# Patient Record
Sex: FEMALE | Race: WHITE | NOT HISPANIC OR LATINO | Employment: OTHER | URBAN - METROPOLITAN AREA
[De-identification: names, ages, dates, MRNs, and addresses within clinical notes are randomized per-mention and may not be internally consistent; named-entity substitution may affect disease eponyms.]

---

## 2017-04-20 PROBLEM — I10 ESSENTIAL HYPERTENSION: Status: ACTIVE | Noted: 2017-04-20

## 2019-04-28 PROBLEM — E83.52 HYPERCALCEMIA: Status: ACTIVE | Noted: 2019-04-28

## 2019-04-28 PROBLEM — R07.9 CHEST PAIN: Status: ACTIVE | Noted: 2019-04-28

## 2019-04-28 PROBLEM — E87.1 HYPONATREMIA: Status: ACTIVE | Noted: 2019-04-28

## 2019-04-29 PROBLEM — E83.52 HYPERCALCEMIA: Status: RESOLVED | Noted: 2019-04-28 | Resolved: 2019-04-29

## 2020-12-29 PROBLEM — F51.01 PRIMARY INSOMNIA: Status: ACTIVE | Noted: 2020-12-29

## 2022-01-01 ENCOUNTER — TELEPHONE (OUTPATIENT)
Dept: SURGERY | Facility: CLINIC | Age: 62
End: 2022-01-01
Payer: MEDICARE

## 2022-01-01 ENCOUNTER — TELEPHONE (OUTPATIENT)
Dept: SURGERY | Facility: CLINIC | Age: 62
End: 2022-01-01

## 2022-01-01 ENCOUNTER — ANESTHESIA EVENT (OUTPATIENT)
Dept: INTENSIVE CARE | Facility: HOSPITAL | Age: 62
DRG: 326 | End: 2022-01-01
Payer: MEDICARE

## 2022-01-01 ENCOUNTER — ANESTHESIA (OUTPATIENT)
Dept: INTENSIVE CARE | Facility: HOSPITAL | Age: 62
DRG: 326 | End: 2022-01-01
Payer: MEDICARE

## 2022-01-01 ENCOUNTER — OFFICE VISIT (OUTPATIENT)
Dept: SURGERY | Facility: CLINIC | Age: 62
End: 2022-01-01
Payer: MEDICARE

## 2022-01-01 ENCOUNTER — TELEPHONE (OUTPATIENT)
Dept: HEMATOLOGY/ONCOLOGY | Facility: CLINIC | Age: 62
End: 2022-01-01
Payer: MEDICARE

## 2022-01-01 ENCOUNTER — OFFICE VISIT (OUTPATIENT)
Dept: HEMATOLOGY/ONCOLOGY | Facility: CLINIC | Age: 62
End: 2022-01-01
Payer: MEDICARE

## 2022-01-01 ENCOUNTER — ANESTHESIA EVENT (OUTPATIENT)
Dept: SURGERY | Facility: HOSPITAL | Age: 62
DRG: 326 | End: 2022-01-01
Payer: MEDICARE

## 2022-01-01 ENCOUNTER — TREATMENT PLANNING (OUTPATIENT)
Dept: SURGERY | Facility: CLINIC | Age: 62
End: 2022-01-01
Payer: MEDICARE

## 2022-01-01 ENCOUNTER — LAB VISIT (OUTPATIENT)
Dept: LAB | Facility: HOSPITAL | Age: 62
End: 2022-01-01
Attending: SURGERY
Payer: MEDICARE

## 2022-01-01 ENCOUNTER — HOSPITAL ENCOUNTER (OUTPATIENT)
Dept: RADIOLOGY | Facility: HOSPITAL | Age: 62
Discharge: HOME | End: 2022-09-29
Attending: SURGERY
Payer: MEDICARE

## 2022-01-01 ENCOUNTER — DOCUMENTATION ONLY (OUTPATIENT)
Dept: ADMINISTRATIVE | Facility: OTHER | Age: 62
End: 2022-01-01
Payer: MEDICARE

## 2022-01-01 ENCOUNTER — DOCUMENTATION ONLY (OUTPATIENT)
Dept: SURGERY | Facility: CLINIC | Age: 62
End: 2022-01-01

## 2022-01-01 ENCOUNTER — DOCUMENTATION ONLY (OUTPATIENT)
Dept: HEMATOLOGY/ONCOLOGY | Facility: CLINIC | Age: 62
End: 2022-01-01
Payer: MEDICARE

## 2022-01-01 ENCOUNTER — ANESTHESIA (OUTPATIENT)
Dept: SURGERY | Facility: HOSPITAL | Age: 62
DRG: 326 | End: 2022-01-01
Payer: MEDICARE

## 2022-01-01 ENCOUNTER — TUMOR BOARD CONFERENCE (OUTPATIENT)
Dept: HEMATOLOGY/ONCOLOGY | Facility: CLINIC | Age: 62
End: 2022-01-01
Payer: MEDICARE

## 2022-01-01 ENCOUNTER — HOSPITAL ENCOUNTER (INPATIENT)
Facility: HOSPITAL | Age: 62
LOS: 6 days | DRG: 326 | End: 2022-11-08
Attending: SURGERY | Admitting: SURGERY
Payer: MEDICARE

## 2022-01-01 ENCOUNTER — HOSPITAL ENCOUNTER (OUTPATIENT)
Dept: RADIOLOGY | Facility: HOSPITAL | Age: 62
Discharge: HOME | End: 2022-09-21
Attending: SURGERY
Payer: MEDICARE

## 2022-01-01 VITALS
TEMPERATURE: 98 F | DIASTOLIC BLOOD PRESSURE: 67 MMHG | RESPIRATION RATE: 16 BRPM | BODY MASS INDEX: 19.61 KG/M2 | SYSTOLIC BLOOD PRESSURE: 148 MMHG | HEIGHT: 64 IN | HEART RATE: 64 BPM | OXYGEN SATURATION: 100 % | WEIGHT: 114.88 LBS

## 2022-01-01 VITALS
OXYGEN SATURATION: 69 % | RESPIRATION RATE: 24 BRPM | DIASTOLIC BLOOD PRESSURE: 61 MMHG | HEIGHT: 64 IN | HEART RATE: 87 BPM | BODY MASS INDEX: 21.22 KG/M2 | TEMPERATURE: 99 F | WEIGHT: 124.31 LBS | SYSTOLIC BLOOD PRESSURE: 100 MMHG

## 2022-01-01 VITALS
WEIGHT: 122 LBS | HEIGHT: 64 IN | HEART RATE: 74 BPM | DIASTOLIC BLOOD PRESSURE: 77 MMHG | OXYGEN SATURATION: 100 % | BODY MASS INDEX: 20.83 KG/M2 | SYSTOLIC BLOOD PRESSURE: 178 MMHG

## 2022-01-01 DIAGNOSIS — R05.8 OTHER SPECIFIED COUGH: ICD-10-CM

## 2022-01-01 DIAGNOSIS — C15.9 SQUAMOUS CELL ESOPHAGEAL CANCER: Primary | ICD-10-CM

## 2022-01-01 DIAGNOSIS — C15.9 ESOPHAGEAL CARCINOMA: ICD-10-CM

## 2022-01-01 DIAGNOSIS — R63.4 UNINTENTIONAL WEIGHT LOSS: ICD-10-CM

## 2022-01-01 DIAGNOSIS — R00.0 TACHYCARDIA: ICD-10-CM

## 2022-01-01 DIAGNOSIS — C15.9 SQUAMOUS CELL ESOPHAGEAL CANCER: ICD-10-CM

## 2022-01-01 DIAGNOSIS — R13.19 ESOPHAGEAL DYSPHAGIA: ICD-10-CM

## 2022-01-01 DIAGNOSIS — J80 ARDS (ADULT RESPIRATORY DISTRESS SYNDROME): ICD-10-CM

## 2022-01-01 DIAGNOSIS — R09.02 HYPOXIA: ICD-10-CM

## 2022-01-01 DIAGNOSIS — R94.31 QT PROLONGATION: ICD-10-CM

## 2022-01-01 DIAGNOSIS — D69.6 THROMBOCYTOPENIA: ICD-10-CM

## 2022-01-01 DIAGNOSIS — J96.01 ACUTE HYPOXEMIC RESPIRATORY FAILURE: Primary | ICD-10-CM

## 2022-01-01 DIAGNOSIS — E43 SEVERE PROTEIN-CALORIE MALNUTRITION: ICD-10-CM

## 2022-01-01 DIAGNOSIS — I48.91 ATRIAL FIBRILLATION, UNSPECIFIED TYPE: ICD-10-CM

## 2022-01-01 DIAGNOSIS — R91.8 MULTIPLE SUBSOLID LUNG NODULES LESS THAN 6 MM IN DIAMETER: ICD-10-CM

## 2022-01-01 DIAGNOSIS — I48.0 PAROXYSMAL ATRIAL FIBRILLATION: ICD-10-CM

## 2022-01-01 DIAGNOSIS — I48.91 ATRIAL FIBRILLATION: ICD-10-CM

## 2022-01-01 DIAGNOSIS — R91.8 MULTIPLE SUBSOLID LUNG NODULES LESS THAN 6 MM IN DIAMETER: Primary | ICD-10-CM

## 2022-01-01 LAB
ABO + RH BLD: NORMAL
ABO + RH BLD: NORMAL
ALBUMIN SERPL BCP-MCNC: 2 G/DL (ref 3.5–5.2)
ALBUMIN SERPL BCP-MCNC: 2.2 G/DL (ref 3.5–5.2)
ALBUMIN SERPL BCP-MCNC: 2.4 G/DL (ref 3.5–5.2)
ALBUMIN SERPL BCP-MCNC: 2.8 G/DL (ref 3.5–5.2)
ALBUMIN SERPL BCP-MCNC: 2.8 G/DL (ref 3.5–5.2)
ALBUMIN SERPL BCP-MCNC: 2.9 G/DL (ref 3.5–5.2)
ALBUMIN SERPL BCP-MCNC: 3.1 G/DL (ref 3.5–5.2)
ALBUMIN SERPL BCP-MCNC: 3.2 G/DL (ref 3.5–5.2)
ALLENS TEST: ABNORMAL
ALLENS TEST: NORMAL
ALP SERPL-CCNC: 100 U/L (ref 55–135)
ALP SERPL-CCNC: 128 U/L (ref 55–135)
ALP SERPL-CCNC: 151 U/L (ref 55–135)
ALP SERPL-CCNC: 48 U/L (ref 55–135)
ALP SERPL-CCNC: 52 U/L (ref 55–135)
ALP SERPL-CCNC: 56 U/L (ref 55–135)
ALP SERPL-CCNC: 64 U/L (ref 55–135)
ALP SERPL-CCNC: 65 U/L (ref 55–135)
ALP SERPL-CCNC: 79 U/L (ref 55–135)
ALP SERPL-CCNC: 99 U/L (ref 55–135)
ALT SERPL W/O P-5'-P-CCNC: 14 U/L (ref 10–44)
ALT SERPL W/O P-5'-P-CCNC: 15 U/L (ref 10–44)
ALT SERPL W/O P-5'-P-CCNC: 16 U/L (ref 10–44)
ALT SERPL W/O P-5'-P-CCNC: 21 U/L (ref 10–44)
ALT SERPL W/O P-5'-P-CCNC: 21 U/L (ref 10–44)
ALT SERPL W/O P-5'-P-CCNC: 25 U/L (ref 10–44)
ALT SERPL W/O P-5'-P-CCNC: 26 U/L (ref 10–44)
ALT SERPL W/O P-5'-P-CCNC: 287 U/L (ref 10–44)
ALT SERPL W/O P-5'-P-CCNC: 30 U/L (ref 10–44)
ALT SERPL W/O P-5'-P-CCNC: 55 U/L (ref 10–44)
ANION GAP SERPL CALC-SCNC: 10 MMOL/L (ref 8–16)
ANION GAP SERPL CALC-SCNC: 11 MMOL/L (ref 8–16)
ANION GAP SERPL CALC-SCNC: 11 MMOL/L (ref 8–16)
ANION GAP SERPL CALC-SCNC: 13 MMOL/L (ref 8–16)
ANION GAP SERPL CALC-SCNC: 13 MMOL/L (ref 8–16)
ANION GAP SERPL CALC-SCNC: 6 MMOL/L (ref 8–16)
ANION GAP SERPL CALC-SCNC: 6 MMOL/L (ref 8–16)
ANION GAP SERPL CALC-SCNC: 8 MMOL/L (ref 8–16)
ANION GAP SERPL CALC-SCNC: 9 MMOL/L (ref 8–16)
ANISOCYTOSIS BLD QL SMEAR: ABNORMAL
ANISOCYTOSIS BLD QL SMEAR: SLIGHT
APTT BLDCRRT: 37 SEC (ref 21–32)
ASCENDING AORTA: 3.46 CM
AST SERPL-CCNC: 20 U/L (ref 10–40)
AST SERPL-CCNC: 35 U/L (ref 10–40)
AST SERPL-CCNC: 35 U/L (ref 10–40)
AST SERPL-CCNC: 36 U/L (ref 10–40)
AST SERPL-CCNC: 38 U/L (ref 10–40)
AST SERPL-CCNC: 40 U/L (ref 10–40)
AST SERPL-CCNC: 42 U/L (ref 10–40)
AST SERPL-CCNC: 47 U/L (ref 10–40)
AST SERPL-CCNC: 671 U/L (ref 10–40)
AST SERPL-CCNC: 99 U/L (ref 10–40)
AV INDEX (PROSTH): 0.77
AV MEAN GRADIENT: 9 MMHG
AV PEAK GRADIENT: 16 MMHG
AV VALVE AREA: 2.89 CM2
AV VELOCITY RATIO: 0.78
BACTERIA #/AREA URNS AUTO: ABNORMAL /HPF
BASO STIPL BLD QL SMEAR: ABNORMAL
BASOPHILS # BLD AUTO: 0 K/UL (ref 0–0.2)
BASOPHILS # BLD AUTO: 0.01 K/UL (ref 0–0.2)
BASOPHILS # BLD AUTO: 0.02 K/UL (ref 0–0.2)
BASOPHILS # BLD AUTO: 0.03 K/UL (ref 0–0.2)
BASOPHILS # BLD AUTO: 0.04 K/UL (ref 0–0.2)
BASOPHILS # BLD AUTO: 0.04 K/UL (ref 0–0.2)
BASOPHILS # BLD AUTO: 0.08 K/UL (ref 0–0.2)
BASOPHILS # BLD AUTO: 0.09 K/UL (ref 0–0.2)
BASOPHILS NFR BLD: 0 % (ref 0–1.9)
BASOPHILS NFR BLD: 0 % (ref 0–1.9)
BASOPHILS NFR BLD: 0.1 % (ref 0–1.9)
BASOPHILS NFR BLD: 0.2 % (ref 0–1.9)
BASOPHILS NFR BLD: 0.3 % (ref 0–1.9)
BASOPHILS NFR BLD: 0.4 % (ref 0–1.9)
BASOPHILS NFR BLD: 0.5 % (ref 0–1.9)
BASOPHILS NFR BLD: 0.7 % (ref 0–1.9)
BILIRUB SERPL-MCNC: 0.5 MG/DL (ref 0.1–1)
BILIRUB SERPL-MCNC: 0.7 MG/DL (ref 0.1–1)
BILIRUB SERPL-MCNC: 0.8 MG/DL (ref 0.1–1)
BILIRUB SERPL-MCNC: 0.9 MG/DL (ref 0.1–1)
BILIRUB SERPL-MCNC: 1.1 MG/DL (ref 0.1–1)
BILIRUB SERPL-MCNC: 1.4 MG/DL (ref 0.1–1)
BILIRUB SERPL-MCNC: 1.8 MG/DL (ref 0.1–1)
BILIRUB SERPL-MCNC: 3.4 MG/DL (ref 0.1–1)
BILIRUB UR QL STRIP: NEGATIVE
BLD GP AB SCN CELLS X3 SERPL QL: NORMAL
BLD GP AB SCN CELLS X3 SERPL QL: NORMAL
BLD PROD TYP BPU: NORMAL
BLOOD UNIT EXPIRATION DATE: NORMAL
BLOOD UNIT TYPE CODE: 5100
BLOOD UNIT TYPE CODE: 5100
BLOOD UNIT TYPE CODE: 6200
BLOOD UNIT TYPE CODE: 8400
BLOOD UNIT TYPE: NORMAL
BNP SERPL-MCNC: 1147 PG/ML (ref 0–99)
BSA FOR ECHO PROCEDURE: 1.46 M2
BUN SERPL-MCNC: 11 MG/DL (ref 6–20)
BUN SERPL-MCNC: 14 MG/DL (ref 6–20)
BUN SERPL-MCNC: 15 MG/DL (ref 6–20)
BUN SERPL-MCNC: 16 MG/DL (ref 6–20)
BUN SERPL-MCNC: 16 MG/DL (ref 6–20)
BUN SERPL-MCNC: 17 MG/DL (ref 6–20)
BUN SERPL-MCNC: 20 MG/DL (ref 6–20)
BUN SERPL-MCNC: 23 MG/DL (ref 6–20)
BUN SERPL-MCNC: 24 MG/DL (ref 6–20)
BUN SERPL-MCNC: 25 MG/DL (ref 6–20)
BUN SERPL-MCNC: 26 MG/DL (ref 6–20)
BUN SERPL-MCNC: 29 MG/DL (ref 6–20)
BUN SERPL-MCNC: 31 MG/DL (ref 6–20)
BUN SERPL-MCNC: 34 MG/DL (ref 6–20)
BUN SERPL-MCNC: 36 MG/DL (ref 6–20)
BUN SERPL-MCNC: 36 MG/DL (ref 6–20)
BUN SERPL-MCNC: 39 MG/DL (ref 6–20)
BUN SERPL-MCNC: 42 MG/DL (ref 6–20)
BUN SERPL-MCNC: 51 MG/DL (ref 6–20)
BUN SERPL-MCNC: 57 MG/DL (ref 6–20)
BUN SERPL-MCNC: 57 MG/DL (ref 6–20)
BUN SERPL-MCNC: 60 MG/DL (ref 6–20)
BUN SERPL-MCNC: 65 MG/DL (ref 6–20)
BURR CELLS BLD QL SMEAR: ABNORMAL
CA-I BLDV-SCNC: 1.13 MMOL/L (ref 1.06–1.42)
CA-I BLDV-SCNC: 1.17 MMOL/L (ref 1.06–1.42)
CA-I BLDV-SCNC: 1.2 MMOL/L (ref 1.06–1.42)
CA-I BLDV-SCNC: 1.21 MMOL/L (ref 1.06–1.42)
CALCIUM SERPL-MCNC: 8 MG/DL (ref 8.7–10.5)
CALCIUM SERPL-MCNC: 8 MG/DL (ref 8.7–10.5)
CALCIUM SERPL-MCNC: 8.1 MG/DL (ref 8.7–10.5)
CALCIUM SERPL-MCNC: 8.2 MG/DL (ref 8.7–10.5)
CALCIUM SERPL-MCNC: 8.4 MG/DL (ref 8.7–10.5)
CALCIUM SERPL-MCNC: 8.5 MG/DL (ref 8.7–10.5)
CALCIUM SERPL-MCNC: 8.6 MG/DL (ref 8.7–10.5)
CALCIUM SERPL-MCNC: 8.7 MG/DL (ref 8.7–10.5)
CALCIUM SERPL-MCNC: 8.8 MG/DL (ref 8.7–10.5)
CALCIUM SERPL-MCNC: 8.9 MG/DL (ref 8.7–10.5)
CALCIUM SERPL-MCNC: 8.9 MG/DL (ref 8.7–10.5)
CALCIUM SERPL-MCNC: 9 MG/DL (ref 8.7–10.5)
CALCIUM SERPL-MCNC: 9.2 MG/DL (ref 8.7–10.5)
CALCIUM SERPL-MCNC: 9.2 MG/DL (ref 8.7–10.5)
CALCIUM SERPL-MCNC: 9.7 MG/DL (ref 8.7–10.5)
CHLORIDE SERPL-SCNC: 101 MMOL/L (ref 95–110)
CHLORIDE SERPL-SCNC: 104 MMOL/L (ref 95–110)
CHLORIDE SERPL-SCNC: 106 MMOL/L (ref 95–110)
CHLORIDE SERPL-SCNC: 107 MMOL/L (ref 95–110)
CHLORIDE SERPL-SCNC: 108 MMOL/L (ref 95–110)
CHLORIDE SERPL-SCNC: 109 MMOL/L (ref 95–110)
CHLORIDE SERPL-SCNC: 109 MMOL/L (ref 95–110)
CHLORIDE SERPL-SCNC: 110 MMOL/L (ref 95–110)
CHLORIDE SERPL-SCNC: 111 MMOL/L (ref 95–110)
CHLORIDE SERPL-SCNC: 112 MMOL/L (ref 95–110)
CHLORIDE SERPL-SCNC: 113 MMOL/L (ref 95–110)
CLARITY UR REFRACT.AUTO: CLEAR
CO2 SERPL-SCNC: 18 MMOL/L (ref 23–29)
CO2 SERPL-SCNC: 19 MMOL/L (ref 23–29)
CO2 SERPL-SCNC: 19 MMOL/L (ref 23–29)
CO2 SERPL-SCNC: 20 MMOL/L (ref 23–29)
CO2 SERPL-SCNC: 23 MMOL/L (ref 23–29)
CO2 SERPL-SCNC: 24 MMOL/L (ref 23–29)
CO2 SERPL-SCNC: 25 MMOL/L (ref 23–29)
CO2 SERPL-SCNC: 27 MMOL/L (ref 23–29)
CODING SYSTEM: NORMAL
COLOR UR AUTO: YELLOW
COPPER SERPL-MCNC: 731 UG/L (ref 810–1990)
CREAT SERPL-MCNC: 0.6 MG/DL (ref 0.5–1.4)
CREAT SERPL-MCNC: 0.7 MG/DL (ref 0.5–1.4)
CREAT SERPL-MCNC: 0.8 MG/DL (ref 0.5–1.4)
CREAT SERPL-MCNC: 0.9 MG/DL (ref 0.5–1.4)
CREAT SERPL-MCNC: 1 MG/DL (ref 0.5–1.4)
CREAT UR-MCNC: 56 MG/DL (ref 15–325)
CRP SERPL-MCNC: 207.31 MG/L (ref 0–3.19)
CRP SERPL-MCNC: 243 MG/L (ref 0–8.2)
CRP SERPL-MCNC: 296.26 MG/L (ref 0–3.19)
CRP SERPL-MCNC: 323.9 MG/L (ref 0–3.19)
CRP SERPL-MCNC: 375.18 MG/L (ref 0–3.19)
CV ECHO LV RWT: 0.31 CM
DACRYOCYTES BLD QL SMEAR: ABNORMAL
DELSYS: ABNORMAL
DELSYS: NORMAL
DIFFERENTIAL METHOD: ABNORMAL
DISPENSE STATUS: NORMAL
DOHLE BOD BLD QL SMEAR: PRESENT
DOP CALC AO PEAK VEL: 2.02 M/S
DOP CALC AO VTI: 39.98 CM
DOP CALC LVOT AREA: 3.8 CM2
DOP CALC LVOT DIAMETER: 2.19 CM
DOP CALC LVOT PEAK VEL: 1.57 M/S
DOP CALC LVOT STROKE VOLUME: 115.47 CM3
DOP CALC MV VTI: 55.75 CM
DOP CALCLVOT PEAK VEL VTI: 30.67 CM
E WAVE DECELERATION TIME: 349.34 MSEC
E/A RATIO: 1.12
ECHO LV POSTERIOR WALL: 0.84 CM (ref 0.6–1.1)
EJECTION FRACTION: 65 %
EOSINOPHIL # BLD AUTO: 0 K/UL (ref 0–0.5)
EOSINOPHIL # BLD AUTO: 0.1 K/UL (ref 0–0.5)
EOSINOPHIL # BLD AUTO: 0.1 K/UL (ref 0–0.5)
EOSINOPHIL NFR BLD: 0 % (ref 0–8)
EOSINOPHIL NFR BLD: 0.1 % (ref 0–8)
EOSINOPHIL NFR BLD: 0.2 % (ref 0–8)
EOSINOPHIL NFR BLD: 0.2 % (ref 0–8)
EOSINOPHIL NFR BLD: 0.4 % (ref 0–8)
EOSINOPHIL NFR BLD: 0.4 % (ref 0–8)
ERYTHROCYTE [DISTWIDTH] IN BLOOD BY AUTOMATED COUNT: 18.8 % (ref 11.5–14.5)
ERYTHROCYTE [DISTWIDTH] IN BLOOD BY AUTOMATED COUNT: 21.2 % (ref 11.5–14.5)
ERYTHROCYTE [DISTWIDTH] IN BLOOD BY AUTOMATED COUNT: 21.6 % (ref 11.5–14.5)
ERYTHROCYTE [DISTWIDTH] IN BLOOD BY AUTOMATED COUNT: 21.6 % (ref 11.5–14.5)
ERYTHROCYTE [DISTWIDTH] IN BLOOD BY AUTOMATED COUNT: 21.7 % (ref 11.5–14.5)
ERYTHROCYTE [DISTWIDTH] IN BLOOD BY AUTOMATED COUNT: 21.8 % (ref 11.5–14.5)
ERYTHROCYTE [DISTWIDTH] IN BLOOD BY AUTOMATED COUNT: 22 % (ref 11.5–14.5)
ERYTHROCYTE [DISTWIDTH] IN BLOOD BY AUTOMATED COUNT: 22 % (ref 11.5–14.5)
ERYTHROCYTE [DISTWIDTH] IN BLOOD BY AUTOMATED COUNT: 22.1 % (ref 11.5–14.5)
ERYTHROCYTE [DISTWIDTH] IN BLOOD BY AUTOMATED COUNT: 22.5 % (ref 11.5–14.5)
ERYTHROCYTE [DISTWIDTH] IN BLOOD BY AUTOMATED COUNT: 22.6 % (ref 11.5–14.5)
ERYTHROCYTE [DISTWIDTH] IN BLOOD BY AUTOMATED COUNT: ABNORMAL % (ref 11.5–14.5)
ERYTHROCYTE [SEDIMENTATION RATE] IN BLOOD BY WESTERGREN METHOD: 20 MM/H
ERYTHROCYTE [SEDIMENTATION RATE] IN BLOOD BY WESTERGREN METHOD: 24 MM/H
ERYTHROCYTE [SEDIMENTATION RATE] IN BLOOD BY WESTERGREN METHOD: 24 MM/H
EST. GFR  (NO RACE VARIABLE): >60 ML/MIN/1.73 M^2
FIBRINOGEN PPP-MCNC: 318 MG/DL (ref 182–400)
FIO2: 100
FIO2: 100
FIO2: 5
FIO2: 60
FIO2: 65
FIO2: 65
FIO2: 70
FIO2: 80
FLOW: 15
FOLATE SERPL-MCNC: 9.2 NG/ML (ref 4–24)
FRACTIONAL SHORTENING: 34 % (ref 28–44)
GIANT PLATELETS BLD QL SMEAR: PRESENT
GLUCOSE SERPL-MCNC: 116 MG/DL (ref 70–110)
GLUCOSE SERPL-MCNC: 116 MG/DL (ref 70–110)
GLUCOSE SERPL-MCNC: 120 MG/DL (ref 70–110)
GLUCOSE SERPL-MCNC: 120 MG/DL (ref 70–110)
GLUCOSE SERPL-MCNC: 122 MG/DL (ref 70–110)
GLUCOSE SERPL-MCNC: 123 MG/DL (ref 70–110)
GLUCOSE SERPL-MCNC: 124 MG/DL (ref 70–110)
GLUCOSE SERPL-MCNC: 124 MG/DL (ref 70–110)
GLUCOSE SERPL-MCNC: 127 MG/DL (ref 70–110)
GLUCOSE SERPL-MCNC: 131 MG/DL (ref 70–110)
GLUCOSE SERPL-MCNC: 138 MG/DL (ref 70–110)
GLUCOSE SERPL-MCNC: 138 MG/DL (ref 70–110)
GLUCOSE SERPL-MCNC: 139 MG/DL (ref 70–110)
GLUCOSE SERPL-MCNC: 139 MG/DL (ref 70–110)
GLUCOSE SERPL-MCNC: 143 MG/DL (ref 70–110)
GLUCOSE SERPL-MCNC: 155 MG/DL (ref 70–110)
GLUCOSE SERPL-MCNC: 161 MG/DL (ref 70–110)
GLUCOSE SERPL-MCNC: 162 MG/DL (ref 70–110)
GLUCOSE SERPL-MCNC: 163 MG/DL (ref 70–110)
GLUCOSE SERPL-MCNC: 166 MG/DL (ref 70–110)
GLUCOSE SERPL-MCNC: 183 MG/DL (ref 70–110)
GLUCOSE SERPL-MCNC: 89 MG/DL (ref 70–110)
GLUCOSE SERPL-MCNC: 90 MG/DL (ref 70–110)
GLUCOSE UR QL STRIP: NEGATIVE
HAPTOGLOB SERPL-MCNC: 134 MG/DL (ref 30–250)
HBV SURFACE AG SERPL QL IA: NORMAL
HCO3 UR-SCNC: 17.3 MMOL/L (ref 24–28)
HCO3 UR-SCNC: 17.7 MMOL/L (ref 24–28)
HCO3 UR-SCNC: 18.9 MMOL/L (ref 24–28)
HCO3 UR-SCNC: 19 MMOL/L (ref 24–28)
HCO3 UR-SCNC: 19.6 MMOL/L (ref 24–28)
HCO3 UR-SCNC: 20.1 MMOL/L (ref 24–28)
HCO3 UR-SCNC: 20.1 MMOL/L (ref 24–28)
HCO3 UR-SCNC: 20.4 MMOL/L (ref 24–28)
HCO3 UR-SCNC: 21.4 MMOL/L (ref 24–28)
HCO3 UR-SCNC: 21.8 MMOL/L (ref 24–28)
HCO3 UR-SCNC: 23.3 MMOL/L (ref 24–28)
HCO3 UR-SCNC: 23.7 MMOL/L (ref 24–28)
HCO3 UR-SCNC: 24.7 MMOL/L (ref 24–28)
HCO3 UR-SCNC: 25.7 MMOL/L (ref 24–28)
HCO3 UR-SCNC: 25.7 MMOL/L (ref 24–28)
HCO3 UR-SCNC: 27.9 MMOL/L (ref 24–28)
HCO3 UR-SCNC: 28.1 MMOL/L (ref 24–28)
HCO3 UR-SCNC: 28.2 MMOL/L (ref 24–28)
HCO3 UR-SCNC: 28.5 MMOL/L (ref 24–28)
HCO3 UR-SCNC: 29.2 MMOL/L (ref 24–28)
HCO3 UR-SCNC: 29.5 MMOL/L (ref 24–28)
HCO3 UR-SCNC: 30.4 MMOL/L (ref 24–28)
HCO3 UR-SCNC: 30.5 MMOL/L (ref 24–28)
HCO3 UR-SCNC: 32 MMOL/L (ref 24–28)
HCT VFR BLD AUTO: 18.7 % (ref 37–48.5)
HCT VFR BLD AUTO: 18.8 % (ref 37–48.5)
HCT VFR BLD AUTO: 21 % (ref 37–48.5)
HCT VFR BLD AUTO: 23.4 % (ref 37–48.5)
HCT VFR BLD AUTO: 23.9 % (ref 37–48.5)
HCT VFR BLD AUTO: 24 % (ref 37–48.5)
HCT VFR BLD AUTO: 24.2 % (ref 37–48.5)
HCT VFR BLD AUTO: 24.8 % (ref 37–48.5)
HCT VFR BLD AUTO: 24.8 % (ref 37–48.5)
HCT VFR BLD AUTO: 24.9 % (ref 37–48.5)
HCT VFR BLD AUTO: 25 % (ref 37–48.5)
HCT VFR BLD AUTO: 26.2 % (ref 37–48.5)
HCT VFR BLD AUTO: 26.5 % (ref 37–48.5)
HCT VFR BLD AUTO: 27.3 % (ref 37–48.5)
HCT VFR BLD AUTO: 28.8 % (ref 37–48.5)
HCT VFR BLD AUTO: 29 % (ref 37–48.5)
HCT VFR BLD AUTO: 30.4 % (ref 37–48.5)
HCT VFR BLD AUTO: 31.7 % (ref 37–48.5)
HCT VFR BLD CALC: 16 %PCV (ref 36–54)
HCT VFR BLD CALC: 20 %PCV (ref 36–54)
HCT VFR BLD CALC: 21 %PCV (ref 36–54)
HCT VFR BLD CALC: 23 %PCV (ref 36–54)
HCT VFR BLD CALC: 24 %PCV (ref 36–54)
HCT VFR BLD CALC: 26 %PCV (ref 36–54)
HCT VFR BLD CALC: 27 %PCV (ref 36–54)
HCT VFR BLD CALC: 30 %PCV (ref 36–54)
HCV AB SERPL QL IA: NORMAL
HGB BLD-MCNC: 10 G/DL (ref 12–16)
HGB BLD-MCNC: 10.8 G/DL (ref 12–16)
HGB BLD-MCNC: 11.1 G/DL (ref 12–16)
HGB BLD-MCNC: 6.2 G/DL (ref 12–16)
HGB BLD-MCNC: 6.4 G/DL (ref 12–16)
HGB BLD-MCNC: 7.3 G/DL (ref 12–16)
HGB BLD-MCNC: 8 G/DL (ref 12–16)
HGB BLD-MCNC: 8.1 G/DL (ref 12–16)
HGB BLD-MCNC: 8.2 G/DL (ref 12–16)
HGB BLD-MCNC: 8.2 G/DL (ref 12–16)
HGB BLD-MCNC: 8.3 G/DL (ref 12–16)
HGB BLD-MCNC: 8.4 G/DL (ref 12–16)
HGB BLD-MCNC: 8.5 G/DL (ref 12–16)
HGB BLD-MCNC: 8.6 G/DL (ref 12–16)
HGB BLD-MCNC: 9 G/DL (ref 12–16)
HGB BLD-MCNC: 9.1 G/DL (ref 12–16)
HGB BLD-MCNC: 9.5 G/DL (ref 12–16)
HGB BLD-MCNC: 9.6 G/DL (ref 12–16)
HGB UR QL STRIP: ABNORMAL
HIV 1+2 AB+HIV1 P24 AG SERPL QL IA: NORMAL
HOWELL-JOLLY BOD BLD QL SMEAR: ABNORMAL
HR MV ECHO: 62 BPM
HYALINE CASTS UR QL AUTO: 0 /LPF
HYPOCHROMIA BLD QL SMEAR: ABNORMAL
IMM GRANULOCYTES # BLD AUTO: 0.01 K/UL (ref 0–0.04)
IMM GRANULOCYTES # BLD AUTO: 0.02 K/UL (ref 0–0.04)
IMM GRANULOCYTES # BLD AUTO: 0.02 K/UL (ref 0–0.04)
IMM GRANULOCYTES # BLD AUTO: 0.04 K/UL (ref 0–0.04)
IMM GRANULOCYTES # BLD AUTO: 0.05 K/UL (ref 0–0.04)
IMM GRANULOCYTES # BLD AUTO: 0.05 K/UL (ref 0–0.04)
IMM GRANULOCYTES # BLD AUTO: 0.06 K/UL (ref 0–0.04)
IMM GRANULOCYTES # BLD AUTO: 0.06 K/UL (ref 0–0.04)
IMM GRANULOCYTES # BLD AUTO: 0.07 K/UL (ref 0–0.04)
IMM GRANULOCYTES # BLD AUTO: 0.07 K/UL (ref 0–0.04)
IMM GRANULOCYTES # BLD AUTO: 0.08 K/UL (ref 0–0.04)
IMM GRANULOCYTES # BLD AUTO: 0.08 K/UL (ref 0–0.04)
IMM GRANULOCYTES # BLD AUTO: 0.09 K/UL (ref 0–0.04)
IMM GRANULOCYTES # BLD AUTO: 0.1 K/UL (ref 0–0.04)
IMM GRANULOCYTES # BLD AUTO: 0.1 K/UL (ref 0–0.04)
IMM GRANULOCYTES # BLD AUTO: ABNORMAL K/UL (ref 0–0.04)
IMM GRANULOCYTES NFR BLD AUTO: 0.2 % (ref 0–0.5)
IMM GRANULOCYTES NFR BLD AUTO: 0.2 % (ref 0–0.5)
IMM GRANULOCYTES NFR BLD AUTO: 0.3 % (ref 0–0.5)
IMM GRANULOCYTES NFR BLD AUTO: 0.4 % (ref 0–0.5)
IMM GRANULOCYTES NFR BLD AUTO: 0.5 % (ref 0–0.5)
IMM GRANULOCYTES NFR BLD AUTO: 0.6 % (ref 0–0.5)
IMM GRANULOCYTES NFR BLD AUTO: 0.7 % (ref 0–0.5)
IMM GRANULOCYTES NFR BLD AUTO: 0.7 % (ref 0–0.5)
IMM GRANULOCYTES NFR BLD AUTO: ABNORMAL % (ref 0–0.5)
INR PPP: 1.1 (ref 0.8–1.2)
INR PPP: 1.2 (ref 0.8–1.2)
INTERVENTRICULAR SEPTUM: 1.02 CM (ref 0.6–1.1)
IVRT: 85.63 MSEC
KETONES UR QL STRIP: NEGATIVE
LA MAJOR: 6.71 CM
LA MINOR: 6.82 CM
LA WIDTH: 4 CM
LACTATE SERPL-SCNC: 1 MMOL/L (ref 0.5–2.2)
LACTATE SERPL-SCNC: 1.5 MMOL/L (ref 0.5–2.2)
LDH SERPL L TO P-CCNC: 0.93 MMOL/L (ref 0.36–1.25)
LDH SERPL L TO P-CCNC: 1.45 MMOL/L (ref 0.36–1.25)
LDH SERPL L TO P-CCNC: 2.64 MMOL/L (ref 0.36–1.25)
LDH SERPL L TO P-CCNC: 2.82 MMOL/L (ref 0.36–1.25)
LDH SERPL L TO P-CCNC: 390 U/L (ref 110–260)
LDH SERPL L TO P-CCNC: 4 MMOL/L (ref 0.36–1.25)
LDH SERPL L TO P-CCNC: 4.61 MMOL/L (ref 0.36–1.25)
LEFT ATRIUM SIZE: 4.39 CM
LEFT ATRIUM VOLUME INDEX MOD: 58.3 ML/M2
LEFT ATRIUM VOLUME INDEX: 68.2 ML/M2
LEFT ATRIUM VOLUME MOD: 86.25 CM3
LEFT ATRIUM VOLUME: 100.97 CM3
LEFT INTERNAL DIMENSION IN SYSTOLE: 3.53 CM (ref 2.1–4)
LEFT VENTRICLE DIASTOLIC VOLUME INDEX: 93.39 ML/M2
LEFT VENTRICLE DIASTOLIC VOLUME: 138.22 ML
LEFT VENTRICLE MASS INDEX: 125 G/M2
LEFT VENTRICLE SYSTOLIC VOLUME INDEX: 35.2 ML/M2
LEFT VENTRICLE SYSTOLIC VOLUME: 52.09 ML
LEFT VENTRICULAR INTERNAL DIMENSION IN DIASTOLE: 5.35 CM (ref 3.5–6)
LEFT VENTRICULAR MASS: 185.03 G
LEUKOCYTE ESTERASE UR QL STRIP: NEGATIVE
LYMPHOCYTES # BLD AUTO: 0.1 K/UL (ref 1–4.8)
LYMPHOCYTES # BLD AUTO: 0.2 K/UL (ref 1–4.8)
LYMPHOCYTES # BLD AUTO: 0.3 K/UL (ref 1–4.8)
LYMPHOCYTES # BLD AUTO: 0.5 K/UL (ref 1–4.8)
LYMPHOCYTES # BLD AUTO: 0.6 K/UL (ref 1–4.8)
LYMPHOCYTES NFR BLD: 0.4 % (ref 18–48)
LYMPHOCYTES NFR BLD: 0.4 % (ref 18–48)
LYMPHOCYTES NFR BLD: 0.5 % (ref 18–48)
LYMPHOCYTES NFR BLD: 0.5 % (ref 18–48)
LYMPHOCYTES NFR BLD: 0.9 % (ref 18–48)
LYMPHOCYTES NFR BLD: 1.1 % (ref 18–48)
LYMPHOCYTES NFR BLD: 1.4 % (ref 18–48)
LYMPHOCYTES NFR BLD: 1.5 % (ref 18–48)
LYMPHOCYTES NFR BLD: 1.5 % (ref 18–48)
LYMPHOCYTES NFR BLD: 1.8 % (ref 18–48)
LYMPHOCYTES NFR BLD: 2 % (ref 18–48)
LYMPHOCYTES NFR BLD: 2 % (ref 18–48)
LYMPHOCYTES NFR BLD: 2.4 % (ref 18–48)
LYMPHOCYTES NFR BLD: 3.2 % (ref 18–48)
LYMPHOCYTES NFR BLD: 3.3 % (ref 18–48)
LYMPHOCYTES NFR BLD: 6.4 % (ref 18–48)
MAGNESIUM SERPL-MCNC: 1.7 MG/DL (ref 1.6–2.6)
MAGNESIUM SERPL-MCNC: 1.8 MG/DL (ref 1.6–2.6)
MAGNESIUM SERPL-MCNC: 1.9 MG/DL (ref 1.6–2.6)
MAGNESIUM SERPL-MCNC: 1.9 MG/DL (ref 1.6–2.6)
MAGNESIUM SERPL-MCNC: 2 MG/DL (ref 1.6–2.6)
MAGNESIUM SERPL-MCNC: 2.1 MG/DL (ref 1.6–2.6)
MAGNESIUM SERPL-MCNC: 2.2 MG/DL (ref 1.6–2.6)
MAGNESIUM SERPL-MCNC: 2.3 MG/DL (ref 1.6–2.6)
MAGNESIUM SERPL-MCNC: 2.3 MG/DL (ref 1.6–2.6)
MAGNESIUM SERPL-MCNC: 2.4 MG/DL (ref 1.6–2.6)
MAGNESIUM SERPL-MCNC: 2.5 MG/DL (ref 1.6–2.6)
MCH RBC QN AUTO: 33.2 PG (ref 27–31)
MCH RBC QN AUTO: 33.3 PG (ref 27–31)
MCH RBC QN AUTO: 33.5 PG (ref 27–31)
MCH RBC QN AUTO: 33.7 PG (ref 27–31)
MCH RBC QN AUTO: 33.8 PG (ref 27–31)
MCH RBC QN AUTO: 33.9 PG (ref 27–31)
MCH RBC QN AUTO: 34 PG (ref 27–31)
MCH RBC QN AUTO: 34.2 PG (ref 27–31)
MCH RBC QN AUTO: 34.4 PG (ref 27–31)
MCH RBC QN AUTO: 34.5 PG (ref 27–31)
MCH RBC QN AUTO: 34.9 PG (ref 27–31)
MCH RBC QN AUTO: 35.2 PG (ref 27–31)
MCH RBC QN AUTO: 35.3 PG (ref 27–31)
MCH RBC QN AUTO: 35.4 PG (ref 27–31)
MCH RBC QN AUTO: 35.6 PG (ref 27–31)
MCH RBC QN AUTO: 36.6 PG (ref 27–31)
MCH RBC QN AUTO: 37.1 PG (ref 27–31)
MCH RBC QN AUTO: 38 PG (ref 27–31)
MCHC RBC AUTO-ENTMCNC: 32.8 G/DL (ref 32–36)
MCHC RBC AUTO-ENTMCNC: 33 G/DL (ref 32–36)
MCHC RBC AUTO-ENTMCNC: 33.2 G/DL (ref 32–36)
MCHC RBC AUTO-ENTMCNC: 33.9 G/DL (ref 32–36)
MCHC RBC AUTO-ENTMCNC: 34 G/DL (ref 32–36)
MCHC RBC AUTO-ENTMCNC: 34.2 G/DL (ref 32–36)
MCHC RBC AUTO-ENTMCNC: 34.3 G/DL (ref 32–36)
MCHC RBC AUTO-ENTMCNC: 34.5 G/DL (ref 32–36)
MCHC RBC AUTO-ENTMCNC: 34.7 G/DL (ref 32–36)
MCHC RBC AUTO-ENTMCNC: 34.7 G/DL (ref 32–36)
MCHC RBC AUTO-ENTMCNC: 34.8 G/DL (ref 32–36)
MCHC RBC AUTO-ENTMCNC: 35 G/DL (ref 32–36)
MCHC RBC AUTO-ENTMCNC: 35.2 G/DL (ref 32–36)
MCHC RBC AUTO-ENTMCNC: 35.5 G/DL (ref 32–36)
MCV RBC AUTO: 100 FL (ref 82–98)
MCV RBC AUTO: 102 FL (ref 82–98)
MCV RBC AUTO: 103 FL (ref 82–98)
MCV RBC AUTO: 103 FL (ref 82–98)
MCV RBC AUTO: 104 FL (ref 82–98)
MCV RBC AUTO: 104 FL (ref 82–98)
MCV RBC AUTO: 106 FL (ref 82–98)
MCV RBC AUTO: 106 FL (ref 82–98)
MCV RBC AUTO: 107 FL (ref 82–98)
MCV RBC AUTO: 108 FL (ref 82–98)
MCV RBC AUTO: 109 FL (ref 82–98)
MCV RBC AUTO: 96 FL (ref 82–98)
MCV RBC AUTO: 97 FL (ref 82–98)
MCV RBC AUTO: 98 FL (ref 82–98)
MCV RBC AUTO: 99 FL (ref 82–98)
MCV RBC AUTO: 99 FL (ref 82–98)
MICROSCOPIC COMMENT: ABNORMAL
MODE: ABNORMAL
MODE: NORMAL
MONOCYTES # BLD AUTO: 0.1 K/UL (ref 0.3–1)
MONOCYTES # BLD AUTO: 0.2 K/UL (ref 0.3–1)
MONOCYTES # BLD AUTO: 0.2 K/UL (ref 0.3–1)
MONOCYTES # BLD AUTO: 0.3 K/UL (ref 0.3–1)
MONOCYTES # BLD AUTO: 0.4 K/UL (ref 0.3–1)
MONOCYTES # BLD AUTO: 0.5 K/UL (ref 0.3–1)
MONOCYTES # BLD AUTO: 0.7 K/UL (ref 0.3–1)
MONOCYTES # BLD AUTO: 0.7 K/UL (ref 0.3–1)
MONOCYTES # BLD AUTO: 0.8 K/UL (ref 0.3–1)
MONOCYTES # BLD AUTO: 1.2 K/UL (ref 0.3–1)
MONOCYTES NFR BLD: 1 % (ref 4–15)
MONOCYTES NFR BLD: 1.1 % (ref 4–15)
MONOCYTES NFR BLD: 1.9 % (ref 4–15)
MONOCYTES NFR BLD: 13.5 % (ref 4–15)
MONOCYTES NFR BLD: 2.2 % (ref 4–15)
MONOCYTES NFR BLD: 2.6 % (ref 4–15)
MONOCYTES NFR BLD: 2.8 % (ref 4–15)
MONOCYTES NFR BLD: 3.2 % (ref 4–15)
MONOCYTES NFR BLD: 3.2 % (ref 4–15)
MONOCYTES NFR BLD: 3.3 % (ref 4–15)
MONOCYTES NFR BLD: 3.3 % (ref 4–15)
MONOCYTES NFR BLD: 3.7 % (ref 4–15)
MONOCYTES NFR BLD: 4 % (ref 4–15)
MONOCYTES NFR BLD: 4.3 % (ref 4–15)
MONOCYTES NFR BLD: 4.6 % (ref 4–15)
MONOCYTES NFR BLD: 5 % (ref 4–15)
MONOCYTES NFR BLD: 6.5 % (ref 4–15)
MONOCYTES NFR BLD: 7.3 % (ref 4–15)
MV A" WAVE DURATION": 9.99 MSEC
MV MEAN GRADIENT: 4 MMHG
MV PEAK A VEL: 1.27 M/S
MV PEAK E VEL: 1.42 M/S
MV PEAK GRADIENT: 10 MMHG
MV STENOSIS PRESSURE HALF TIME: 101.31 MS
MV VALVE AREA BY CONTINUITY EQUATION: 2.07 CM2
MV VALVE AREA P 1/2 METHOD: 2.17 CM2
NEUTROPHILS # BLD AUTO: 10 K/UL (ref 1.8–7.7)
NEUTROPHILS # BLD AUTO: 10.8 K/UL (ref 1.8–7.7)
NEUTROPHILS # BLD AUTO: 11.6 K/UL (ref 1.8–7.7)
NEUTROPHILS # BLD AUTO: 11.7 K/UL (ref 1.8–7.7)
NEUTROPHILS # BLD AUTO: 12.1 K/UL (ref 1.8–7.7)
NEUTROPHILS # BLD AUTO: 12.2 K/UL (ref 1.8–7.7)
NEUTROPHILS # BLD AUTO: 12.7 K/UL (ref 1.8–7.7)
NEUTROPHILS # BLD AUTO: 13.1 K/UL (ref 1.8–7.7)
NEUTROPHILS # BLD AUTO: 13.1 K/UL (ref 1.8–7.7)
NEUTROPHILS # BLD AUTO: 13.3 K/UL (ref 1.8–7.7)
NEUTROPHILS # BLD AUTO: 15.2 K/UL (ref 1.8–7.7)
NEUTROPHILS # BLD AUTO: 15.5 K/UL (ref 1.8–7.7)
NEUTROPHILS # BLD AUTO: 5 K/UL (ref 1.8–7.7)
NEUTROPHILS # BLD AUTO: 6.6 K/UL (ref 1.8–7.7)
NEUTROPHILS # BLD AUTO: 6.8 K/UL (ref 1.8–7.7)
NEUTROPHILS # BLD AUTO: 9.3 K/UL (ref 1.8–7.7)
NEUTROPHILS # BLD AUTO: 9.6 K/UL (ref 1.8–7.7)
NEUTROPHILS NFR BLD: 79.3 % (ref 38–73)
NEUTROPHILS NFR BLD: 83 % (ref 38–73)
NEUTROPHILS NFR BLD: 89.9 % (ref 38–73)
NEUTROPHILS NFR BLD: 90 % (ref 38–73)
NEUTROPHILS NFR BLD: 91 % (ref 38–73)
NEUTROPHILS NFR BLD: 92 % (ref 38–73)
NEUTROPHILS NFR BLD: 92.7 % (ref 38–73)
NEUTROPHILS NFR BLD: 93.6 % (ref 38–73)
NEUTROPHILS NFR BLD: 94.3 % (ref 38–73)
NEUTROPHILS NFR BLD: 94.4 % (ref 38–73)
NEUTROPHILS NFR BLD: 94.4 % (ref 38–73)
NEUTROPHILS NFR BLD: 94.5 % (ref 38–73)
NEUTROPHILS NFR BLD: 94.9 % (ref 38–73)
NEUTROPHILS NFR BLD: 95.3 % (ref 38–73)
NEUTROPHILS NFR BLD: 95.7 % (ref 38–73)
NEUTROPHILS NFR BLD: 95.9 % (ref 38–73)
NEUTROPHILS NFR BLD: 97.4 % (ref 38–73)
NEUTROPHILS NFR BLD: 97.6 % (ref 38–73)
NEUTS BAND NFR BLD MANUAL: 11 %
NITRITE UR QL STRIP: NEGATIVE
NRBC BLD-RTO: 0 /100 WBC
NUM UNITS TRANS PACKED RBC: NORMAL
OVALOCYTES BLD QL SMEAR: ABNORMAL
PATH REV BLD -IMP: NORMAL
PATH REV BLD -IMP: NORMAL
PCO2 BLDA: 30.2 MMHG (ref 35–45)
PCO2 BLDA: 32.1 MMHG (ref 35–45)
PCO2 BLDA: 32.2 MMHG (ref 35–45)
PCO2 BLDA: 33.6 MMHG (ref 35–45)
PCO2 BLDA: 33.9 MMHG (ref 35–45)
PCO2 BLDA: 35.4 MMHG (ref 35–45)
PCO2 BLDA: 38 MMHG (ref 35–45)
PCO2 BLDA: 38.4 MMHG (ref 35–45)
PCO2 BLDA: 38.8 MMHG (ref 35–45)
PCO2 BLDA: 38.8 MMHG (ref 35–45)
PCO2 BLDA: 38.9 MMHG (ref 35–45)
PCO2 BLDA: 41.5 MMHG (ref 35–45)
PCO2 BLDA: 43.1 MMHG (ref 35–45)
PCO2 BLDA: 44.1 MMHG (ref 35–45)
PCO2 BLDA: 44.7 MMHG (ref 35–45)
PCO2 BLDA: 47.2 MMHG (ref 35–45)
PCO2 BLDA: 47.6 MMHG (ref 35–45)
PCO2 BLDA: 52.4 MMHG (ref 35–45)
PCO2 BLDA: 53.9 MMHG (ref 35–45)
PCO2 BLDA: 58.6 MMHG (ref 35–45)
PCO2 BLDA: 61.8 MMHG (ref 35–45)
PCO2 BLDA: 65.5 MMHG (ref 35–45)
PCO2 BLDA: 71 MMHG (ref 35–45)
PCO2 BLDA: 72.1 MMHG (ref 35–45)
PEEP: 10
PEEP: 10
PEEP: 8
PH SMN: 7.2 [PH] (ref 7.35–7.45)
PH SMN: 7.24 [PH] (ref 7.35–7.45)
PH SMN: 7.24 [PH] (ref 7.35–7.45)
PH SMN: 7.29 [PH] (ref 7.35–7.45)
PH SMN: 7.3 [PH] (ref 7.35–7.45)
PH SMN: 7.31 [PH] (ref 7.35–7.45)
PH SMN: 7.32 [PH] (ref 7.35–7.45)
PH SMN: 7.33 [PH] (ref 7.35–7.45)
PH SMN: 7.33 [PH] (ref 7.35–7.45)
PH SMN: 7.34 [PH] (ref 7.35–7.45)
PH SMN: 7.34 [PH] (ref 7.35–7.45)
PH SMN: 7.35 [PH] (ref 7.35–7.45)
PH SMN: 7.37 [PH] (ref 7.35–7.45)
PH SMN: 7.38 [PH] (ref 7.35–7.45)
PH SMN: 7.39 [PH] (ref 7.35–7.45)
PH SMN: 7.4 [PH] (ref 7.35–7.45)
PH SMN: 7.4 [PH] (ref 7.35–7.45)
PH SMN: 7.41 [PH] (ref 7.35–7.45)
PH SMN: 7.42 [PH] (ref 7.35–7.45)
PH SMN: 7.47 [PH] (ref 7.35–7.45)
PH UR STRIP: 6 [PH] (ref 5–8)
PHOSPHATE SERPL-MCNC: 1.2 MG/DL (ref 2.7–4.5)
PHOSPHATE SERPL-MCNC: 1.8 MG/DL (ref 2.7–4.5)
PHOSPHATE SERPL-MCNC: 2.7 MG/DL (ref 2.7–4.5)
PHOSPHATE SERPL-MCNC: 3.1 MG/DL (ref 2.7–4.5)
PHOSPHATE SERPL-MCNC: 3.1 MG/DL (ref 2.7–4.5)
PHOSPHATE SERPL-MCNC: 3.2 MG/DL (ref 2.7–4.5)
PHOSPHATE SERPL-MCNC: 3.3 MG/DL (ref 2.7–4.5)
PHOSPHATE SERPL-MCNC: 4.1 MG/DL (ref 2.7–4.5)
PHOSPHATE SERPL-MCNC: 5.2 MG/DL (ref 2.7–4.5)
PISA TR MAX VEL: 2.43 M/S
PLATELET # BLD AUTO: 10 K/UL (ref 150–450)
PLATELET # BLD AUTO: 16 K/UL (ref 150–450)
PLATELET # BLD AUTO: 21 K/UL (ref 150–450)
PLATELET # BLD AUTO: 28 K/UL (ref 150–450)
PLATELET # BLD AUTO: 3 K/UL (ref 150–450)
PLATELET # BLD AUTO: 34 K/UL (ref 150–450)
PLATELET # BLD AUTO: 34 K/UL (ref 150–450)
PLATELET # BLD AUTO: 35 K/UL (ref 150–450)
PLATELET # BLD AUTO: 38 K/UL (ref 150–450)
PLATELET # BLD AUTO: 4 K/UL (ref 150–450)
PLATELET # BLD AUTO: 45 K/UL (ref 150–450)
PLATELET # BLD AUTO: 49 K/UL (ref 150–450)
PLATELET # BLD AUTO: 5 K/UL (ref 150–450)
PLATELET # BLD AUTO: 51 K/UL (ref 150–450)
PLATELET # BLD AUTO: 53 K/UL (ref 150–450)
PLATELET # BLD AUTO: 58 K/UL (ref 150–450)
PLATELET # BLD AUTO: 70 K/UL (ref 150–450)
PLATELET # BLD AUTO: 9 K/UL (ref 150–450)
PLATELET BLD QL SMEAR: ABNORMAL
PMV BLD AUTO: 10.6 FL (ref 9.2–12.9)
PMV BLD AUTO: 10.7 FL (ref 9.2–12.9)
PMV BLD AUTO: 11.2 FL (ref 9.2–12.9)
PMV BLD AUTO: 11.4 FL (ref 9.2–12.9)
PMV BLD AUTO: 11.5 FL (ref 9.2–12.9)
PMV BLD AUTO: 11.6 FL (ref 9.2–12.9)
PMV BLD AUTO: 12 FL (ref 9.2–12.9)
PMV BLD AUTO: 12.2 FL (ref 9.2–12.9)
PMV BLD AUTO: 12.2 FL (ref 9.2–12.9)
PMV BLD AUTO: 12.3 FL (ref 9.2–12.9)
PMV BLD AUTO: 12.5 FL (ref 9.2–12.9)
PMV BLD AUTO: 12.9 FL (ref 9.2–12.9)
PMV BLD AUTO: ABNORMAL FL (ref 9.2–12.9)
PO2 BLDA: 103 MMHG (ref 80–100)
PO2 BLDA: 115 MMHG (ref 80–100)
PO2 BLDA: 115 MMHG (ref 80–100)
PO2 BLDA: 155 MMHG (ref 80–100)
PO2 BLDA: 27 MMHG (ref 40–60)
PO2 BLDA: 35 MMHG (ref 40–60)
PO2 BLDA: 49 MMHG (ref 80–100)
PO2 BLDA: 56 MMHG (ref 80–100)
PO2 BLDA: 60 MMHG (ref 80–100)
PO2 BLDA: 61 MMHG (ref 80–100)
PO2 BLDA: 66 MMHG (ref 80–100)
PO2 BLDA: 66 MMHG (ref 80–100)
PO2 BLDA: 67 MMHG (ref 80–100)
PO2 BLDA: 69 MMHG (ref 80–100)
PO2 BLDA: 71 MMHG (ref 80–100)
PO2 BLDA: 73 MMHG (ref 80–100)
PO2 BLDA: 75 MMHG (ref 80–100)
PO2 BLDA: 78 MMHG (ref 80–100)
PO2 BLDA: 78 MMHG (ref 80–100)
PO2 BLDA: 82 MMHG (ref 80–100)
PO2 BLDA: 88 MMHG (ref 80–100)
PO2 BLDA: 90 MMHG (ref 80–100)
PO2 BLDA: 93 MMHG (ref 80–100)
PO2 BLDA: 95 MMHG (ref 80–100)
POC BE: -1 MMOL/L
POC BE: -1 MMOL/L
POC BE: -2 MMOL/L
POC BE: -3 MMOL/L
POC BE: -4 MMOL/L
POC BE: -5 MMOL/L
POC BE: -6 MMOL/L
POC BE: -7 MMOL/L
POC BE: -8 MMOL/L
POC BE: -8 MMOL/L
POC BE: -9 MMOL/L
POC BE: 0 MMOL/L
POC BE: 1 MMOL/L
POC BE: 3 MMOL/L
POC BE: 4 MMOL/L
POC BE: 7 MMOL/L
POC IONIZED CALCIUM: 1 MMOL/L (ref 1.06–1.42)
POC IONIZED CALCIUM: 1.1 MMOL/L (ref 1.06–1.42)
POC IONIZED CALCIUM: 1.15 MMOL/L (ref 1.06–1.42)
POC IONIZED CALCIUM: 1.16 MMOL/L (ref 1.06–1.42)
POC IONIZED CALCIUM: 1.19 MMOL/L (ref 1.06–1.42)
POC IONIZED CALCIUM: 1.19 MMOL/L (ref 1.06–1.42)
POC IONIZED CALCIUM: 1.2 MMOL/L (ref 1.06–1.42)
POC IONIZED CALCIUM: 1.21 MMOL/L (ref 1.06–1.42)
POC IONIZED CALCIUM: 1.23 MMOL/L (ref 1.06–1.42)
POC IONIZED CALCIUM: 1.23 MMOL/L (ref 1.06–1.42)
POC IONIZED CALCIUM: 1.24 MMOL/L (ref 1.06–1.42)
POC IONIZED CALCIUM: 1.25 MMOL/L (ref 1.06–1.42)
POC IONIZED CALCIUM: 1.29 MMOL/L (ref 1.06–1.42)
POC SATURATED O2: 47 % (ref 95–100)
POC SATURATED O2: 61 % (ref 95–100)
POC SATURATED O2: 82 % (ref 95–100)
POC SATURATED O2: 87 % (ref 95–100)
POC SATURATED O2: 87 % (ref 95–100)
POC SATURATED O2: 89 % (ref 95–100)
POC SATURATED O2: 90 % (ref 95–100)
POC SATURATED O2: 91 % (ref 95–100)
POC SATURATED O2: 91 % (ref 95–100)
POC SATURATED O2: 93 % (ref 95–100)
POC SATURATED O2: 93 % (ref 95–100)
POC SATURATED O2: 94 % (ref 95–100)
POC SATURATED O2: 95 % (ref 95–100)
POC SATURATED O2: 96 % (ref 95–100)
POC SATURATED O2: 97 % (ref 95–100)
POC SATURATED O2: 98 % (ref 95–100)
POC SATURATED O2: 98 % (ref 95–100)
POC SATURATED O2: 99 % (ref 95–100)
POC TCO2: 18 MMOL/L (ref 23–27)
POC TCO2: 19 MMOL/L (ref 23–27)
POC TCO2: 20 MMOL/L (ref 23–27)
POC TCO2: 20 MMOL/L (ref 23–27)
POC TCO2: 21 MMOL/L (ref 23–27)
POC TCO2: 21 MMOL/L (ref 23–27)
POC TCO2: 21 MMOL/L (ref 24–29)
POC TCO2: 22 MMOL/L (ref 23–27)
POC TCO2: 22 MMOL/L (ref 23–27)
POC TCO2: 23 MMOL/L (ref 23–27)
POC TCO2: 25 MMOL/L (ref 23–27)
POC TCO2: 25 MMOL/L (ref 24–29)
POC TCO2: 26 MMOL/L (ref 23–27)
POC TCO2: 27 MMOL/L (ref 23–27)
POC TCO2: 27 MMOL/L (ref 23–27)
POC TCO2: 30 MMOL/L (ref 23–27)
POC TCO2: 31 MMOL/L (ref 23–27)
POC TCO2: 31 MMOL/L (ref 23–27)
POC TCO2: 32 MMOL/L (ref 23–27)
POC TCO2: 33 MMOL/L (ref 23–27)
POC TCO2: 34 MMOL/L (ref 23–27)
POCT GLUCOSE: 121 MG/DL (ref 70–110)
POCT GLUCOSE: 122 MG/DL (ref 70–110)
POCT GLUCOSE: 123 MG/DL (ref 70–110)
POCT GLUCOSE: 126 MG/DL (ref 70–110)
POCT GLUCOSE: 128 MG/DL (ref 70–110)
POCT GLUCOSE: 128 MG/DL (ref 70–110)
POCT GLUCOSE: 130 MG/DL (ref 70–110)
POCT GLUCOSE: 131 MG/DL (ref 70–110)
POCT GLUCOSE: 132 MG/DL (ref 70–110)
POCT GLUCOSE: 140 MG/DL (ref 70–110)
POCT GLUCOSE: 146 MG/DL (ref 70–110)
POCT GLUCOSE: 146 MG/DL (ref 70–110)
POCT GLUCOSE: 151 MG/DL (ref 70–110)
POCT GLUCOSE: 161 MG/DL (ref 70–110)
POCT GLUCOSE: 163 MG/DL (ref 70–110)
POCT GLUCOSE: 170 MG/DL (ref 70–110)
POCT GLUCOSE: 175 MG/DL (ref 70–110)
POCT GLUCOSE: 180 MG/DL (ref 70–110)
POCT GLUCOSE: 442 MG/DL (ref 70–110)
POIKILOCYTOSIS BLD QL SMEAR: ABNORMAL
POIKILOCYTOSIS BLD QL SMEAR: SLIGHT
POLYCHROMASIA BLD QL SMEAR: ABNORMAL
POTASSIUM BLD-SCNC: 2.7 MMOL/L (ref 3.5–5.1)
POTASSIUM BLD-SCNC: 3.1 MMOL/L (ref 3.5–5.1)
POTASSIUM BLD-SCNC: 3.5 MMOL/L (ref 3.5–5.1)
POTASSIUM BLD-SCNC: 3.6 MMOL/L (ref 3.5–5.1)
POTASSIUM BLD-SCNC: 3.7 MMOL/L (ref 3.5–5.1)
POTASSIUM BLD-SCNC: 4 MMOL/L (ref 3.5–5.1)
POTASSIUM BLD-SCNC: 4.1 MMOL/L (ref 3.5–5.1)
POTASSIUM BLD-SCNC: 4.1 MMOL/L (ref 3.5–5.1)
POTASSIUM BLD-SCNC: 4.4 MMOL/L (ref 3.5–5.1)
POTASSIUM BLD-SCNC: 4.7 MMOL/L (ref 3.5–5.1)
POTASSIUM BLD-SCNC: 4.7 MMOL/L (ref 3.5–5.1)
POTASSIUM SERPL-SCNC: 3.5 MMOL/L (ref 3.5–5.1)
POTASSIUM SERPL-SCNC: 3.5 MMOL/L (ref 3.5–5.1)
POTASSIUM SERPL-SCNC: 3.6 MMOL/L (ref 3.5–5.1)
POTASSIUM SERPL-SCNC: 3.7 MMOL/L (ref 3.5–5.1)
POTASSIUM SERPL-SCNC: 3.8 MMOL/L (ref 3.5–5.1)
POTASSIUM SERPL-SCNC: 4 MMOL/L (ref 3.5–5.1)
POTASSIUM SERPL-SCNC: 4.1 MMOL/L (ref 3.5–5.1)
POTASSIUM SERPL-SCNC: 4.1 MMOL/L (ref 3.5–5.1)
POTASSIUM SERPL-SCNC: 4.2 MMOL/L (ref 3.5–5.1)
POTASSIUM SERPL-SCNC: 4.3 MMOL/L (ref 3.5–5.1)
POTASSIUM SERPL-SCNC: 4.4 MMOL/L (ref 3.5–5.1)
POTASSIUM SERPL-SCNC: 4.5 MMOL/L (ref 3.5–5.1)
POTASSIUM SERPL-SCNC: 4.5 MMOL/L (ref 3.5–5.1)
POTASSIUM SERPL-SCNC: 4.6 MMOL/L (ref 3.5–5.1)
POTASSIUM SERPL-SCNC: 5 MMOL/L (ref 3.5–5.1)
POTASSIUM SERPL-SCNC: 5.1 MMOL/L (ref 3.5–5.1)
PREALB SERPL-MCNC: 14 MG/DL (ref 20–43)
PREALB SERPL-MCNC: 7 MG/DL (ref 20–43)
PROT SERPL-MCNC: 4.8 G/DL (ref 6–8.4)
PROT SERPL-MCNC: 4.9 G/DL (ref 6–8.4)
PROT SERPL-MCNC: 5.1 G/DL (ref 6–8.4)
PROT SERPL-MCNC: 5.3 G/DL (ref 6–8.4)
PROT SERPL-MCNC: 5.3 G/DL (ref 6–8.4)
PROT SERPL-MCNC: 5.4 G/DL (ref 6–8.4)
PROT SERPL-MCNC: 5.5 G/DL (ref 6–8.4)
PROT SERPL-MCNC: 5.6 G/DL (ref 6–8.4)
PROT SERPL-MCNC: 5.7 G/DL (ref 6–8.4)
PROT SERPL-MCNC: 7 G/DL (ref 6–8.4)
PROT UR QL STRIP: ABNORMAL
PROTHROMBIN TIME: 11.2 SEC (ref 9–12.5)
PROTHROMBIN TIME: 11.9 SEC (ref 9–12.5)
PULM VEIN S/D RATIO: 1.97
PV PEAK D VEL: 0.37 M/S
PV PEAK S VEL: 0.73 M/S
RA MAJOR: 5.14 CM
RA WIDTH: 4.71 CM
RBC # BLD AUTO: 1.8 M/UL (ref 4–5.4)
RBC # BLD AUTO: 1.8 M/UL (ref 4–5.4)
RBC # BLD AUTO: 1.97 M/UL (ref 4–5.4)
RBC # BLD AUTO: 2.24 M/UL (ref 4–5.4)
RBC # BLD AUTO: 2.27 M/UL (ref 4–5.4)
RBC # BLD AUTO: 2.35 M/UL (ref 4–5.4)
RBC # BLD AUTO: 2.35 M/UL (ref 4–5.4)
RBC # BLD AUTO: 2.4 M/UL (ref 4–5.4)
RBC # BLD AUTO: 2.42 M/UL (ref 4–5.4)
RBC # BLD AUTO: 2.53 M/UL (ref 4–5.4)
RBC # BLD AUTO: 2.58 M/UL (ref 4–5.4)
RBC # BLD AUTO: 2.66 M/UL (ref 4–5.4)
RBC # BLD AUTO: 2.66 M/UL (ref 4–5.4)
RBC # BLD AUTO: 2.75 M/UL (ref 4–5.4)
RBC # BLD AUTO: 2.83 M/UL (ref 4–5.4)
RBC # BLD AUTO: 2.92 M/UL (ref 4–5.4)
RBC # BLD AUTO: 2.97 M/UL (ref 4–5.4)
RBC # BLD AUTO: 3.18 M/UL (ref 4–5.4)
RBC #/AREA URNS AUTO: 36 /HPF (ref 0–4)
RETICS/RBC NFR AUTO: 1.9 % (ref 0.5–2.5)
RIGHT VENTRICULAR END-DIASTOLIC DIMENSION: 4.19 CM
RV TISSUE DOPPLER FREE WALL SYSTOLIC VELOCITY 1 (APICAL 4 CHAMBER VIEW): 12.09 CM/S
SAMPLE: ABNORMAL
SAMPLE: NORMAL
SCHISTOCYTES BLD QL SMEAR: ABNORMAL
SINUS: 3.19 CM
SITE: ABNORMAL
SITE: NORMAL
SODIUM BLD-SCNC: 135 MMOL/L (ref 136–145)
SODIUM BLD-SCNC: 137 MMOL/L (ref 136–145)
SODIUM BLD-SCNC: 137 MMOL/L (ref 136–145)
SODIUM BLD-SCNC: 138 MMOL/L (ref 136–145)
SODIUM BLD-SCNC: 139 MMOL/L (ref 136–145)
SODIUM BLD-SCNC: 141 MMOL/L (ref 136–145)
SODIUM BLD-SCNC: 141 MMOL/L (ref 136–145)
SODIUM BLD-SCNC: 142 MMOL/L (ref 136–145)
SODIUM BLD-SCNC: 145 MMOL/L (ref 136–145)
SODIUM BLD-SCNC: 147 MMOL/L (ref 136–145)
SODIUM BLD-SCNC: 148 MMOL/L (ref 136–145)
SODIUM BLD-SCNC: 149 MMOL/L (ref 136–145)
SODIUM SERPL-SCNC: 133 MMOL/L (ref 136–145)
SODIUM SERPL-SCNC: 134 MMOL/L (ref 136–145)
SODIUM SERPL-SCNC: 135 MMOL/L (ref 136–145)
SODIUM SERPL-SCNC: 136 MMOL/L (ref 136–145)
SODIUM SERPL-SCNC: 136 MMOL/L (ref 136–145)
SODIUM SERPL-SCNC: 137 MMOL/L (ref 136–145)
SODIUM SERPL-SCNC: 138 MMOL/L (ref 136–145)
SODIUM SERPL-SCNC: 138 MMOL/L (ref 136–145)
SODIUM SERPL-SCNC: 139 MMOL/L (ref 136–145)
SODIUM SERPL-SCNC: 139 MMOL/L (ref 136–145)
SODIUM SERPL-SCNC: 140 MMOL/L (ref 136–145)
SODIUM SERPL-SCNC: 141 MMOL/L (ref 136–145)
SODIUM SERPL-SCNC: 142 MMOL/L (ref 136–145)
SODIUM SERPL-SCNC: 144 MMOL/L (ref 136–145)
SODIUM SERPL-SCNC: 145 MMOL/L (ref 136–145)
SODIUM SERPL-SCNC: 145 MMOL/L (ref 136–145)
SODIUM SERPL-SCNC: 147 MMOL/L (ref 136–145)
SODIUM SERPL-SCNC: 148 MMOL/L (ref 136–145)
SODIUM SERPL-SCNC: 150 MMOL/L (ref 136–145)
SODIUM SERPL-SCNC: 150 MMOL/L (ref 136–145)
SODIUM UR-SCNC: <10 MMOL/L (ref 20–250)
SP GR UR STRIP: 1.03 (ref 1–1.03)
SPHEROCYTES BLD QL SMEAR: ABNORMAL
STJ: 2.64 CM
TARGETS BLD QL SMEAR: ABNORMAL
TARGETS BLD QL SMEAR: ABNORMAL
TOXIC GRANULES BLD QL SMEAR: PRESENT
TR MAX PG: 24 MMHG
TRICUSPID ANNULAR PLANE SYSTOLIC EXCURSION: 2.27 CM
TRIGL SERPL-MCNC: 121 MG/DL (ref 30–150)
UNIT NUMBER: NORMAL
URN SPEC COLLECT METH UR: ABNORMAL
UUN UR-MCNC: >1200 MG/DL (ref 140–1050)
VIT B12 SERPL-MCNC: >2000 PG/ML (ref 210–950)
VT: 370
VT: 370
VT: 450
VT: 450
VT: 470
WBC # BLD AUTO: 10.03 K/UL (ref 3.9–12.7)
WBC # BLD AUTO: 10.24 K/UL (ref 3.9–12.7)
WBC # BLD AUTO: 10.68 K/UL (ref 3.9–12.7)
WBC # BLD AUTO: 11.45 K/UL (ref 3.9–12.7)
WBC # BLD AUTO: 12.3 K/UL (ref 3.9–12.7)
WBC # BLD AUTO: 12.34 K/UL (ref 3.9–12.7)
WBC # BLD AUTO: 12.57 K/UL (ref 3.9–12.7)
WBC # BLD AUTO: 12.74 K/UL (ref 3.9–12.7)
WBC # BLD AUTO: 13.33 K/UL (ref 3.9–12.7)
WBC # BLD AUTO: 13.8 K/UL (ref 3.9–12.7)
WBC # BLD AUTO: 14.23 K/UL (ref 3.9–12.7)
WBC # BLD AUTO: 14.3 K/UL (ref 3.9–12.7)
WBC # BLD AUTO: 14.35 K/UL (ref 3.9–12.7)
WBC # BLD AUTO: 15.66 K/UL (ref 3.9–12.7)
WBC # BLD AUTO: 16.41 K/UL (ref 3.9–12.7)
WBC # BLD AUTO: 5.39 K/UL (ref 3.9–12.7)
WBC # BLD AUTO: 7.28 K/UL (ref 3.9–12.7)
WBC # BLD AUTO: 8.58 K/UL (ref 3.9–12.7)
WBC #/AREA URNS AUTO: 1 /HPF (ref 0–5)
WBC TOXIC VACUOLES BLD QL SMEAR: PRESENT

## 2022-01-01 PROCEDURE — 88332 PATH CONSLTJ SURG EA ADD BLK: CPT | Mod: 26,,, | Performed by: PATHOLOGY

## 2022-01-01 PROCEDURE — 36000712 HC OR TIME LEV V 1ST 15 MIN: Performed by: SURGERY

## 2022-01-01 PROCEDURE — 1159F PR MEDICATION LIST DOCUMENTED IN MEDICAL RECORD: ICD-10-PCS | Mod: CPTII,S$GLB,, | Performed by: SURGERY

## 2022-01-01 PROCEDURE — 80048 BASIC METABOLIC PNL TOTAL CA: CPT | Mod: 91,XB | Performed by: STUDENT IN AN ORGANIZED HEALTH CARE EDUCATION/TRAINING PROGRAM

## 2022-01-01 PROCEDURE — 85025 COMPLETE CBC W/AUTO DIFF WBC: CPT | Mod: 91 | Performed by: SURGERY

## 2022-01-01 PROCEDURE — 3077F PR MOST RECENT SYSTOLIC BLOOD PRESSURE >= 140 MM HG: ICD-10-PCS | Mod: CPTII,S$GLB,, | Performed by: NURSE PRACTITIONER

## 2022-01-01 PROCEDURE — 27100171 HC OXYGEN HIGH FLOW UP TO 24 HOURS

## 2022-01-01 PROCEDURE — 32674 PR THORACOSCOPY LYMPH NODE EXC: ICD-10-PCS | Mod: ,,, | Performed by: SURGERY

## 2022-01-01 PROCEDURE — 80048 BASIC METABOLIC PNL TOTAL CA: CPT | Mod: XB | Performed by: STUDENT IN AN ORGANIZED HEALTH CARE EDUCATION/TRAINING PROGRAM

## 2022-01-01 PROCEDURE — 93010 EKG 12-LEAD: ICD-10-PCS | Mod: ,,, | Performed by: INTERNAL MEDICINE

## 2022-01-01 PROCEDURE — 63600175 PHARM REV CODE 636 W HCPCS

## 2022-01-01 PROCEDURE — 82570 ASSAY OF URINE CREATININE: CPT | Performed by: STUDENT IN AN ORGANIZED HEALTH CARE EDUCATION/TRAINING PROGRAM

## 2022-01-01 PROCEDURE — 71250 CT THORAX DX C-: CPT | Mod: 26,,, | Performed by: RADIOLOGY

## 2022-01-01 PROCEDURE — 88304 PR  SURG PATH,LEVEL III: ICD-10-PCS | Mod: 26,,, | Performed by: PATHOLOGY

## 2022-01-01 PROCEDURE — 93005 ELECTROCARDIOGRAM TRACING: CPT

## 2022-01-01 PROCEDURE — 36000713 HC OR TIME LEV V EA ADD 15 MIN: Performed by: SURGERY

## 2022-01-01 PROCEDURE — S4991 NICOTINE PATCH NONLEGEND: HCPCS | Performed by: STUDENT IN AN ORGANIZED HEALTH CARE EDUCATION/TRAINING PROGRAM

## 2022-01-01 PROCEDURE — 86141 C-REACTIVE PROTEIN HS: CPT | Performed by: NURSE PRACTITIONER

## 2022-01-01 PROCEDURE — 85045 AUTOMATED RETICULOCYTE COUNT: CPT | Performed by: STUDENT IN AN ORGANIZED HEALTH CARE EDUCATION/TRAINING PROGRAM

## 2022-01-01 PROCEDURE — 20000000 HC ICU ROOM

## 2022-01-01 PROCEDURE — 27000221 HC OXYGEN, UP TO 24 HOURS

## 2022-01-01 PROCEDURE — 99223 1ST HOSP IP/OBS HIGH 75: CPT | Mod: ,,, | Performed by: INTERNAL MEDICINE

## 2022-01-01 PROCEDURE — 83605 ASSAY OF LACTIC ACID: CPT

## 2022-01-01 PROCEDURE — 94799 UNLISTED PULMONARY SVC/PX: CPT

## 2022-01-01 PROCEDURE — 83605 ASSAY OF LACTIC ACID: CPT | Performed by: STUDENT IN AN ORGANIZED HEALTH CARE EDUCATION/TRAINING PROGRAM

## 2022-01-01 PROCEDURE — 86140 C-REACTIVE PROTEIN: CPT

## 2022-01-01 PROCEDURE — 99900035 HC TECH TIME PER 15 MIN (STAT)

## 2022-01-01 PROCEDURE — 83735 ASSAY OF MAGNESIUM: CPT | Mod: 91 | Performed by: STUDENT IN AN ORGANIZED HEALTH CARE EDUCATION/TRAINING PROGRAM

## 2022-01-01 PROCEDURE — D9220A PRA ANESTHESIA: Mod: CRNA,,, | Performed by: NURSE ANESTHETIST, CERTIFIED REGISTERED

## 2022-01-01 PROCEDURE — 85014 HEMATOCRIT: CPT

## 2022-01-01 PROCEDURE — 1111F DSCHRG MED/CURRENT MED MERGE: CPT | Mod: CPTII,S$GLB,, | Performed by: SURGERY

## 2022-01-01 PROCEDURE — 94640 AIRWAY INHALATION TREATMENT: CPT

## 2022-01-01 PROCEDURE — 1160F RVW MEDS BY RX/DR IN RCRD: CPT | Mod: CPTII,S$GLB,, | Performed by: NURSE PRACTITIONER

## 2022-01-01 PROCEDURE — 63600175 PHARM REV CODE 636 W HCPCS: Mod: JG

## 2022-01-01 PROCEDURE — 82330 ASSAY OF CALCIUM: CPT

## 2022-01-01 PROCEDURE — 88309 TISSUE EXAM BY PATHOLOGIST: CPT | Mod: 26,,, | Performed by: PATHOLOGY

## 2022-01-01 PROCEDURE — 84100 ASSAY OF PHOSPHORUS: CPT | Performed by: STUDENT IN AN ORGANIZED HEALTH CARE EDUCATION/TRAINING PROGRAM

## 2022-01-01 PROCEDURE — 36415 COLL VENOUS BLD VENIPUNCTURE: CPT | Mod: PN | Performed by: SURGERY

## 2022-01-01 PROCEDURE — 85384 FIBRINOGEN ACTIVITY: CPT | Performed by: STUDENT IN AN ORGANIZED HEALTH CARE EDUCATION/TRAINING PROGRAM

## 2022-01-01 PROCEDURE — 82330 ASSAY OF CALCIUM: CPT | Performed by: SURGERY

## 2022-01-01 PROCEDURE — 87340 HEPATITIS B SURFACE AG IA: CPT | Performed by: STUDENT IN AN ORGANIZED HEALTH CARE EDUCATION/TRAINING PROGRAM

## 2022-01-01 PROCEDURE — 71250 CT CHEST WITHOUT CONTRAST: ICD-10-PCS | Mod: 26,,, | Performed by: RADIOLOGY

## 2022-01-01 PROCEDURE — 83735 ASSAY OF MAGNESIUM: CPT | Performed by: STUDENT IN AN ORGANIZED HEALTH CARE EDUCATION/TRAINING PROGRAM

## 2022-01-01 PROCEDURE — 1159F MED LIST DOCD IN RCRD: CPT | Mod: CPTII,S$GLB,, | Performed by: NURSE PRACTITIONER

## 2022-01-01 PROCEDURE — 99291 CRITICAL CARE FIRST HOUR: CPT | Mod: 24,25,GC, | Performed by: SURGERY

## 2022-01-01 PROCEDURE — 82525 ASSAY OF COPPER: CPT | Performed by: STUDENT IN AN ORGANIZED HEALTH CARE EDUCATION/TRAINING PROGRAM

## 2022-01-01 PROCEDURE — 99900026 HC AIRWAY MAINTENANCE (STAT)

## 2022-01-01 PROCEDURE — 63600175 PHARM REV CODE 636 W HCPCS: Performed by: STUDENT IN AN ORGANIZED HEALTH CARE EDUCATION/TRAINING PROGRAM

## 2022-01-01 PROCEDURE — 25000003 PHARM REV CODE 250: Performed by: STUDENT IN AN ORGANIZED HEALTH CARE EDUCATION/TRAINING PROGRAM

## 2022-01-01 PROCEDURE — 82803 BLOOD GASES ANY COMBINATION: CPT

## 2022-01-01 PROCEDURE — 25000242 PHARM REV CODE 250 ALT 637 W/ HCPCS: Performed by: NURSE PRACTITIONER

## 2022-01-01 PROCEDURE — 37000008 HC ANESTHESIA 1ST 15 MINUTES: Performed by: SURGERY

## 2022-01-01 PROCEDURE — 25000003 PHARM REV CODE 250: Performed by: SURGERY

## 2022-01-01 PROCEDURE — 12000002 HC ACUTE/MED SURGE SEMI-PRIVATE ROOM

## 2022-01-01 PROCEDURE — 25000003 PHARM REV CODE 250

## 2022-01-01 PROCEDURE — 63600175 PHARM REV CODE 636 W HCPCS: Mod: JG | Performed by: STUDENT IN AN ORGANIZED HEALTH CARE EDUCATION/TRAINING PROGRAM

## 2022-01-01 PROCEDURE — P9016 RBC LEUKOCYTES REDUCED: HCPCS

## 2022-01-01 PROCEDURE — D9220A PRA ANESTHESIA: ICD-10-PCS | Mod: CRNA,,, | Performed by: NURSE ANESTHETIST, CERTIFIED REGISTERED

## 2022-01-01 PROCEDURE — 88305 TISSUE EXAM BY PATHOLOGIST: CPT | Performed by: PATHOLOGY

## 2022-01-01 PROCEDURE — 86920 COMPATIBILITY TEST SPIN: CPT

## 2022-01-01 PROCEDURE — 85025 COMPLETE CBC W/AUTO DIFF WBC: CPT | Performed by: SURGERY

## 2022-01-01 PROCEDURE — A4216 STERILE WATER/SALINE, 10 ML: HCPCS | Performed by: SURGERY

## 2022-01-01 PROCEDURE — 25000242 PHARM REV CODE 250 ALT 637 W/ HCPCS: Performed by: SURGERY

## 2022-01-01 PROCEDURE — 36430 TRANSFUSION BLD/BLD COMPNT: CPT

## 2022-01-01 PROCEDURE — 99233 PR SUBSEQUENT HOSPITAL CARE,LEVL III: ICD-10-PCS | Mod: ,,, | Performed by: SURGERY

## 2022-01-01 PROCEDURE — 80053 COMPREHEN METABOLIC PANEL: CPT | Performed by: STUDENT IN AN ORGANIZED HEALTH CARE EDUCATION/TRAINING PROGRAM

## 2022-01-01 PROCEDURE — A4217 STERILE WATER/SALINE, 500 ML: HCPCS

## 2022-01-01 PROCEDURE — 99499 ARTERIAL LINE: ICD-10-PCS | Mod: GC,,,

## 2022-01-01 PROCEDURE — 3078F DIAST BP <80 MM HG: CPT | Mod: CPTII,S$GLB,, | Performed by: SURGERY

## 2022-01-01 PROCEDURE — 63600175 PHARM REV CODE 636 W HCPCS: Performed by: SURGERY

## 2022-01-01 PROCEDURE — 82800 BLOOD PH: CPT

## 2022-01-01 PROCEDURE — 37799 UNLISTED PX VASCULAR SURGERY: CPT

## 2022-01-01 PROCEDURE — 71000016 HC POSTOP RECOV ADDL HR: Performed by: SURGERY

## 2022-01-01 PROCEDURE — 84295 ASSAY OF SERUM SODIUM: CPT

## 2022-01-01 PROCEDURE — 86920 COMPATIBILITY TEST SPIN: CPT | Performed by: STUDENT IN AN ORGANIZED HEALTH CARE EDUCATION/TRAINING PROGRAM

## 2022-01-01 PROCEDURE — 99233 PR SUBSEQUENT HOSPITAL CARE,LEVL III: ICD-10-PCS | Mod: ,,, | Performed by: INTERNAL MEDICINE

## 2022-01-01 PROCEDURE — 88332 PATH CONSLTJ SURG EA ADD BLK: CPT | Mod: 59 | Performed by: PATHOLOGY

## 2022-01-01 PROCEDURE — 88305 TISSUE EXAM BY PATHOLOGIST: CPT | Mod: 26,,, | Performed by: PATHOLOGY

## 2022-01-01 PROCEDURE — 83615 LACTATE (LD) (LDH) ENZYME: CPT | Performed by: STUDENT IN AN ORGANIZED HEALTH CARE EDUCATION/TRAINING PROGRAM

## 2022-01-01 PROCEDURE — 85060 BLOOD SMEAR INTERPRETATION: CPT | Mod: ,,, | Performed by: PATHOLOGY

## 2022-01-01 PROCEDURE — 99292 CRITICAL CARE ADDL 30 MIN: CPT | Mod: 24,25,GC, | Performed by: SURGERY

## 2022-01-01 PROCEDURE — 47600 PR REMOVAL GALLBLADDER: ICD-10-PCS | Mod: 51,,, | Performed by: SURGERY

## 2022-01-01 PROCEDURE — 86023 IMMUNOGLOBULIN ASSAY: CPT | Performed by: STUDENT IN AN ORGANIZED HEALTH CARE EDUCATION/TRAINING PROGRAM

## 2022-01-01 PROCEDURE — A4217 STERILE WATER/SALINE, 500 ML: HCPCS | Performed by: STUDENT IN AN ORGANIZED HEALTH CARE EDUCATION/TRAINING PROGRAM

## 2022-01-01 PROCEDURE — 85025 COMPLETE CBC W/AUTO DIFF WBC: CPT | Mod: 91 | Performed by: STUDENT IN AN ORGANIZED HEALTH CARE EDUCATION/TRAINING PROGRAM

## 2022-01-01 PROCEDURE — 84100 ASSAY OF PHOSPHORUS: CPT | Mod: 91 | Performed by: SURGERY

## 2022-01-01 PROCEDURE — 63600175 PHARM REV CODE 636 W HCPCS: Performed by: NURSE ANESTHETIST, CERTIFIED REGISTERED

## 2022-01-01 PROCEDURE — 99222 1ST HOSP IP/OBS MODERATE 55: CPT | Mod: GC,,, | Performed by: INTERNAL MEDICINE

## 2022-01-01 PROCEDURE — 99499 UNLISTED E&M SERVICE: CPT | Mod: GC,,,

## 2022-01-01 PROCEDURE — 88307 TISSUE EXAM BY PATHOLOGIST: CPT | Mod: 26,,, | Performed by: PATHOLOGY

## 2022-01-01 PROCEDURE — 99233 SBSQ HOSP IP/OBS HIGH 50: CPT | Mod: ,,, | Performed by: SURGERY

## 2022-01-01 PROCEDURE — 3078F PR MOST RECENT DIASTOLIC BLOOD PRESSURE < 80 MM HG: ICD-10-PCS | Mod: CPTII,S$GLB,, | Performed by: SURGERY

## 2022-01-01 PROCEDURE — 84300 ASSAY OF URINE SODIUM: CPT | Performed by: STUDENT IN AN ORGANIZED HEALTH CARE EDUCATION/TRAINING PROGRAM

## 2022-01-01 PROCEDURE — 99223 PR INITIAL HOSPITAL CARE,LEVL III: ICD-10-PCS | Mod: ,,, | Performed by: INTERNAL MEDICINE

## 2022-01-01 PROCEDURE — 3008F PR BODY MASS INDEX (BMI) DOCUMENTED: ICD-10-PCS | Mod: CPTII,S$GLB,, | Performed by: SURGERY

## 2022-01-01 PROCEDURE — B4185 PARENTERAL SOL 10 GM LIPIDS: HCPCS | Performed by: STUDENT IN AN ORGANIZED HEALTH CARE EDUCATION/TRAINING PROGRAM

## 2022-01-01 PROCEDURE — 99999 PR PBB SHADOW E&M-EST. PATIENT-LVL III: CPT | Mod: PBBFAC,,, | Performed by: NURSE PRACTITIONER

## 2022-01-01 PROCEDURE — 71000015 HC POSTOP RECOV 1ST HR: Performed by: SURGERY

## 2022-01-01 PROCEDURE — 93010 ELECTROCARDIOGRAM REPORT: CPT | Mod: ,,, | Performed by: INTERNAL MEDICINE

## 2022-01-01 PROCEDURE — 89220 SPUTUM SPECIMEN COLLECTION: CPT

## 2022-01-01 PROCEDURE — P9035 PLATELET PHERES LEUKOREDUCED: HCPCS | Performed by: STUDENT IN AN ORGANIZED HEALTH CARE EDUCATION/TRAINING PROGRAM

## 2022-01-01 PROCEDURE — 99222 PR INITIAL HOSPITAL CARE,LEVL II: ICD-10-PCS | Mod: ,,, | Performed by: INTERNAL MEDICINE

## 2022-01-01 PROCEDURE — 83735 ASSAY OF MAGNESIUM: CPT | Mod: 91

## 2022-01-01 PROCEDURE — 84134 ASSAY OF PREALBUMIN: CPT | Performed by: STUDENT IN AN ORGANIZED HEALTH CARE EDUCATION/TRAINING PROGRAM

## 2022-01-01 PROCEDURE — 88304 TISSUE EXAM BY PATHOLOGIST: CPT | Mod: 26,,, | Performed by: PATHOLOGY

## 2022-01-01 PROCEDURE — C1751 CATH, INF, PER/CENT/MIDLINE: HCPCS

## 2022-01-01 PROCEDURE — 99233 PR SUBSEQUENT HOSPITAL CARE,LEVL III: ICD-10-PCS | Mod: ,,, | Performed by: STUDENT IN AN ORGANIZED HEALTH CARE EDUCATION/TRAINING PROGRAM

## 2022-01-01 PROCEDURE — 32674 THORACOSCOPY LYMPH NODE EXC: CPT | Mod: ,,, | Performed by: SURGERY

## 2022-01-01 PROCEDURE — 88309 TISSUE EXAM BY PATHOLOGIST: CPT | Performed by: PATHOLOGY

## 2022-01-01 PROCEDURE — 85025 COMPLETE CBC W/AUTO DIFF WBC: CPT | Performed by: STUDENT IN AN ORGANIZED HEALTH CARE EDUCATION/TRAINING PROGRAM

## 2022-01-01 PROCEDURE — 99222 PR INITIAL HOSPITAL CARE,LEVL II: ICD-10-PCS | Mod: GC,,, | Performed by: INTERNAL MEDICINE

## 2022-01-01 PROCEDURE — 71000033 HC RECOVERY, INTIAL HOUR: Performed by: SURGERY

## 2022-01-01 PROCEDURE — D9220A PRA ANESTHESIA: Mod: ANES,,, | Performed by: ANESTHESIOLOGY

## 2022-01-01 PROCEDURE — 99291 PR CRITICAL CARE, E/M 30-74 MINUTES: ICD-10-PCS | Mod: 24,25,GC, | Performed by: SURGERY

## 2022-01-01 PROCEDURE — 80053 COMPREHEN METABOLIC PANEL: CPT | Mod: 91

## 2022-01-01 PROCEDURE — 3077F SYST BP >= 140 MM HG: CPT | Mod: CPTII,S$GLB,, | Performed by: SURGERY

## 2022-01-01 PROCEDURE — 36620 PR INSERT CATH,ART,PERCUT,SHORTTERM: ICD-10-PCS | Mod: 59,,, | Performed by: ANESTHESIOLOGY

## 2022-01-01 PROCEDURE — 84132 ASSAY OF SERUM POTASSIUM: CPT

## 2022-01-01 PROCEDURE — 1159F MED LIST DOCD IN RCRD: CPT | Mod: CPTII,S$GLB,, | Performed by: SURGERY

## 2022-01-01 PROCEDURE — 3077F PR MOST RECENT SYSTOLIC BLOOD PRESSURE >= 140 MM HG: ICD-10-PCS | Mod: CPTII,S$GLB,, | Performed by: SURGERY

## 2022-01-01 PROCEDURE — 37000009 HC ANESTHESIA EA ADD 15 MINS: Performed by: SURGERY

## 2022-01-01 PROCEDURE — 88307 PR  SURG PATH,LEVEL V: ICD-10-PCS | Mod: 26,,, | Performed by: PATHOLOGY

## 2022-01-01 PROCEDURE — 94003 VENT MGMT INPAT SUBQ DAY: CPT

## 2022-01-01 PROCEDURE — 85060 PATHOLOGIST REVIEW: ICD-10-PCS | Mod: ,,, | Performed by: PATHOLOGY

## 2022-01-01 PROCEDURE — 85610 PROTHROMBIN TIME: CPT | Performed by: STUDENT IN AN ORGANIZED HEALTH CARE EDUCATION/TRAINING PROGRAM

## 2022-01-01 PROCEDURE — 82565 ASSAY OF CREATININE: CPT

## 2022-01-01 PROCEDURE — P9045 ALBUMIN (HUMAN), 5%, 250 ML: HCPCS | Mod: JG

## 2022-01-01 PROCEDURE — 99999 PR PBB SHADOW E&M-EST. PATIENT-LVL III: ICD-10-PCS | Mod: PBBFAC,,, | Performed by: NURSE PRACTITIONER

## 2022-01-01 PROCEDURE — 4010F PR ACE/ARB THEARPY RXD/TAKEN: ICD-10-PCS | Mod: CPTII,S$GLB,, | Performed by: SURGERY

## 2022-01-01 PROCEDURE — 43288 PR ESOPHAGECTOMY, TOTAL/NEAR TOTAL, W/THORASC MOBL: ICD-10-PCS | Mod: 22,,, | Performed by: SURGERY

## 2022-01-01 PROCEDURE — 25000242 PHARM REV CODE 250 ALT 637 W/ HCPCS: Performed by: STUDENT IN AN ORGANIZED HEALTH CARE EDUCATION/TRAINING PROGRAM

## 2022-01-01 PROCEDURE — 80053 COMPREHEN METABOLIC PANEL: CPT | Mod: PN | Performed by: SURGERY

## 2022-01-01 PROCEDURE — 31500 PR INSERT, EMERGENCY ENDOTRACH AIRWAY: ICD-10-PCS | Mod: ,,, | Performed by: ANESTHESIOLOGY

## 2022-01-01 PROCEDURE — 27000646 HC AEROBIKA DEVICE

## 2022-01-01 PROCEDURE — 88309 PR  SURG PATH,LEVEL VI: ICD-10-PCS | Mod: 26,,, | Performed by: PATHOLOGY

## 2022-01-01 PROCEDURE — 99291 CRITICAL CARE FIRST HOUR: CPT | Mod: ,,, | Performed by: STUDENT IN AN ORGANIZED HEALTH CARE EDUCATION/TRAINING PROGRAM

## 2022-01-01 PROCEDURE — 85025 COMPLETE CBC W/AUTO DIFF WBC: CPT | Mod: 91

## 2022-01-01 PROCEDURE — 1160F PR REVIEW ALL MEDS BY PRESCRIBER/CLIN PHARMACIST DOCUMENTED: ICD-10-PCS | Mod: CPTII,S$GLB,, | Performed by: NURSE PRACTITIONER

## 2022-01-01 PROCEDURE — 82746 ASSAY OF FOLIC ACID SERUM: CPT | Performed by: STUDENT IN AN ORGANIZED HEALTH CARE EDUCATION/TRAINING PROGRAM

## 2022-01-01 PROCEDURE — 27201423 OPTIME MED/SURG SUP & DEVICES STERILE SUPPLY: Performed by: SURGERY

## 2022-01-01 PROCEDURE — 84100 ASSAY OF PHOSPHORUS: CPT | Mod: 91 | Performed by: STUDENT IN AN ORGANIZED HEALTH CARE EDUCATION/TRAINING PROGRAM

## 2022-01-01 PROCEDURE — 94002 VENT MGMT INPAT INIT DAY: CPT

## 2022-01-01 PROCEDURE — 99233 SBSQ HOSP IP/OBS HIGH 50: CPT | Mod: ,,, | Performed by: INTERNAL MEDICINE

## 2022-01-01 PROCEDURE — 78815 PET IMAGE W/CT SKULL-THIGH: CPT | Mod: TC,PI,PN

## 2022-01-01 PROCEDURE — 1111F PR DISCHARGE MEDS RECONCILED W/ CURRENT OUTPATIENT MED LIST: ICD-10-PCS | Mod: CPTII,S$GLB,, | Performed by: SURGERY

## 2022-01-01 PROCEDURE — 82607 VITAMIN B-12: CPT | Performed by: STUDENT IN AN ORGANIZED HEALTH CARE EDUCATION/TRAINING PROGRAM

## 2022-01-01 PROCEDURE — 76937 US GUIDE VASCULAR ACCESS: CPT

## 2022-01-01 PROCEDURE — C1729 CATH, DRAINAGE: HCPCS | Performed by: SURGERY

## 2022-01-01 PROCEDURE — 99999 PR PBB SHADOW E&M-EST. PATIENT-LVL III: ICD-10-PCS | Mod: PBBFAC,,, | Performed by: SURGERY

## 2022-01-01 PROCEDURE — 83010 ASSAY OF HAPTOGLOBIN QUANT: CPT | Performed by: STUDENT IN AN ORGANIZED HEALTH CARE EDUCATION/TRAINING PROGRAM

## 2022-01-01 PROCEDURE — 99292 PR CRITICAL CARE, ADDL 30 MIN: ICD-10-PCS | Mod: 24,25,GC, | Performed by: SURGERY

## 2022-01-01 PROCEDURE — 3008F PR BODY MASS INDEX (BMI) DOCUMENTED: ICD-10-PCS | Mod: CPTII,S$GLB,, | Performed by: NURSE PRACTITIONER

## 2022-01-01 PROCEDURE — 87389 HIV-1 AG W/HIV-1&-2 AB AG IA: CPT | Performed by: STUDENT IN AN ORGANIZED HEALTH CARE EDUCATION/TRAINING PROGRAM

## 2022-01-01 PROCEDURE — 78815 NM PET CT ROUTINE: ICD-10-PCS | Mod: 26,PI,, | Performed by: RADIOLOGY

## 2022-01-01 PROCEDURE — 84134 ASSAY OF PREALBUMIN: CPT | Performed by: SURGERY

## 2022-01-01 PROCEDURE — 43288 ESPHG THRSC MOBLJ: CPT | Mod: 22,,, | Performed by: SURGERY

## 2022-01-01 PROCEDURE — 94664 DEMO&/EVAL PT USE INHALER: CPT

## 2022-01-01 PROCEDURE — P9016 RBC LEUKOCYTES REDUCED: HCPCS | Performed by: STUDENT IN AN ORGANIZED HEALTH CARE EDUCATION/TRAINING PROGRAM

## 2022-01-01 PROCEDURE — 94761 N-INVAS EAR/PLS OXIMETRY MLT: CPT

## 2022-01-01 PROCEDURE — 25000242 PHARM REV CODE 250 ALT 637 W/ HCPCS

## 2022-01-01 PROCEDURE — 1159F PR MEDICATION LIST DOCUMENTED IN MEDICAL RECORD: ICD-10-PCS | Mod: CPTII,S$GLB,, | Performed by: NURSE PRACTITIONER

## 2022-01-01 PROCEDURE — 4010F ACE/ARB THERAPY RXD/TAKEN: CPT | Mod: CPTII,S$GLB,, | Performed by: SURGERY

## 2022-01-01 PROCEDURE — 83880 ASSAY OF NATRIURETIC PEPTIDE: CPT | Performed by: STUDENT IN AN ORGANIZED HEALTH CARE EDUCATION/TRAINING PROGRAM

## 2022-01-01 PROCEDURE — 88305 TISSUE EXAM BY PATHOLOGIST: ICD-10-PCS | Mod: 26,,, | Performed by: PATHOLOGY

## 2022-01-01 PROCEDURE — C9290 INJ, BUPIVACAINE LIPOSOME: HCPCS | Performed by: SURGERY

## 2022-01-01 PROCEDURE — 71250 CT THORAX DX C-: CPT | Mod: TC,PO

## 2022-01-01 PROCEDURE — 3008F BODY MASS INDEX DOCD: CPT | Mod: CPTII,S$GLB,, | Performed by: NURSE PRACTITIONER

## 2022-01-01 PROCEDURE — 36573 INSJ PICC RS&I 5 YR+: CPT

## 2022-01-01 PROCEDURE — 36600 WITHDRAWAL OF ARTERIAL BLOOD: CPT

## 2022-01-01 PROCEDURE — 25000003 PHARM REV CODE 250: Performed by: NURSE ANESTHETIST, CERTIFIED REGISTERED

## 2022-01-01 PROCEDURE — 80053 COMPREHEN METABOLIC PANEL: CPT | Mod: 91 | Performed by: SURGERY

## 2022-01-01 PROCEDURE — 99999 PR PBB SHADOW E&M-EST. PATIENT-LVL III: CPT | Mod: PBBFAC,,, | Performed by: SURGERY

## 2022-01-01 PROCEDURE — 86803 HEPATITIS C AB TEST: CPT | Performed by: STUDENT IN AN ORGANIZED HEALTH CARE EDUCATION/TRAINING PROGRAM

## 2022-01-01 PROCEDURE — 99205 PR OFFICE/OUTPT VISIT, NEW, LEVL V, 60-74 MIN: ICD-10-PCS | Mod: S$GLB,,, | Performed by: SURGERY

## 2022-01-01 PROCEDURE — 99205 OFFICE O/P NEW HI 60 MIN: CPT | Mod: S$GLB,,, | Performed by: SURGERY

## 2022-01-01 PROCEDURE — 83735 ASSAY OF MAGNESIUM: CPT | Mod: 91 | Performed by: SURGERY

## 2022-01-01 PROCEDURE — 3078F PR MOST RECENT DIASTOLIC BLOOD PRESSURE < 80 MM HG: ICD-10-PCS | Mod: CPTII,S$GLB,, | Performed by: NURSE PRACTITIONER

## 2022-01-01 PROCEDURE — 3078F DIAST BP <80 MM HG: CPT | Mod: CPTII,S$GLB,, | Performed by: NURSE PRACTITIONER

## 2022-01-01 PROCEDURE — 85025 COMPLETE CBC W/AUTO DIFF WBC: CPT | Mod: PN | Performed by: SURGERY

## 2022-01-01 PROCEDURE — 99222 1ST HOSP IP/OBS MODERATE 55: CPT | Mod: ,,, | Performed by: INTERNAL MEDICINE

## 2022-01-01 PROCEDURE — 81001 URINALYSIS AUTO W/SCOPE: CPT | Performed by: STUDENT IN AN ORGANIZED HEALTH CARE EDUCATION/TRAINING PROGRAM

## 2022-01-01 PROCEDURE — 88304 TISSUE EXAM BY PATHOLOGIST: CPT | Performed by: PATHOLOGY

## 2022-01-01 PROCEDURE — P9045 ALBUMIN (HUMAN), 5%, 250 ML: HCPCS | Mod: JG | Performed by: STUDENT IN AN ORGANIZED HEALTH CARE EDUCATION/TRAINING PROGRAM

## 2022-01-01 PROCEDURE — 84540 ASSAY OF URINE/UREA-N: CPT | Performed by: STUDENT IN AN ORGANIZED HEALTH CARE EDUCATION/TRAINING PROGRAM

## 2022-01-01 PROCEDURE — 27200966 HC CLOSED SUCTION SYSTEM

## 2022-01-01 PROCEDURE — 36620 INSERTION CATHETER ARTERY: CPT | Mod: 59,,, | Performed by: ANESTHESIOLOGY

## 2022-01-01 PROCEDURE — 3008F BODY MASS INDEX DOCD: CPT | Mod: CPTII,S$GLB,, | Performed by: SURGERY

## 2022-01-01 PROCEDURE — D9220A PRA ANESTHESIA: ICD-10-PCS | Mod: ANES,,, | Performed by: ANESTHESIOLOGY

## 2022-01-01 PROCEDURE — 88307 TISSUE EXAM BY PATHOLOGIST: CPT | Performed by: PATHOLOGY

## 2022-01-01 PROCEDURE — 31500 INSERT EMERGENCY AIRWAY: CPT | Mod: ,,, | Performed by: ANESTHESIOLOGY

## 2022-01-01 PROCEDURE — 86850 RBC ANTIBODY SCREEN: CPT | Performed by: SURGERY

## 2022-01-01 PROCEDURE — 99205 OFFICE O/P NEW HI 60 MIN: CPT | Mod: S$GLB,,, | Performed by: NURSE PRACTITIONER

## 2022-01-01 PROCEDURE — 88331 PATH CONSLTJ SURG 1 BLK 1SPC: CPT | Mod: 26,,, | Performed by: PATHOLOGY

## 2022-01-01 PROCEDURE — 88331 PR  PATH CONSULT IN SURG,W FRZ SEC: ICD-10-PCS | Mod: 26,,, | Performed by: PATHOLOGY

## 2022-01-01 PROCEDURE — 99291 PR CRITICAL CARE, E/M 30-74 MINUTES: ICD-10-PCS | Mod: ,,, | Performed by: STUDENT IN AN ORGANIZED HEALTH CARE EDUCATION/TRAINING PROGRAM

## 2022-01-01 PROCEDURE — 47600 CHOLECYSTECTOMY: CPT | Mod: 51,,, | Performed by: SURGERY

## 2022-01-01 PROCEDURE — 99233 SBSQ HOSP IP/OBS HIGH 50: CPT | Mod: ,,, | Performed by: STUDENT IN AN ORGANIZED HEALTH CARE EDUCATION/TRAINING PROGRAM

## 2022-01-01 PROCEDURE — 85730 THROMBOPLASTIN TIME PARTIAL: CPT | Performed by: STUDENT IN AN ORGANIZED HEALTH CARE EDUCATION/TRAINING PROGRAM

## 2022-01-01 PROCEDURE — 80048 BASIC METABOLIC PNL TOTAL CA: CPT | Performed by: STUDENT IN AN ORGANIZED HEALTH CARE EDUCATION/TRAINING PROGRAM

## 2022-01-01 PROCEDURE — C1894 INTRO/SHEATH, NON-LASER: HCPCS | Performed by: SURGERY

## 2022-01-01 PROCEDURE — 78815 PET IMAGE W/CT SKULL-THIGH: CPT | Mod: 26,PI,, | Performed by: RADIOLOGY

## 2022-01-01 PROCEDURE — 3077F SYST BP >= 140 MM HG: CPT | Mod: CPTII,S$GLB,, | Performed by: NURSE PRACTITIONER

## 2022-01-01 PROCEDURE — 88332 PR  PATH CONSULT IN SURG,W ADDN FRZ SEC: ICD-10-PCS | Mod: 26,,, | Performed by: PATHOLOGY

## 2022-01-01 PROCEDURE — 88331 PATH CONSLTJ SURG 1 BLK 1SPC: CPT | Performed by: PATHOLOGY

## 2022-01-01 PROCEDURE — 86901 BLOOD TYPING SEROLOGIC RH(D): CPT | Performed by: STUDENT IN AN ORGANIZED HEALTH CARE EDUCATION/TRAINING PROGRAM

## 2022-01-01 PROCEDURE — 99205 PR OFFICE/OUTPT VISIT, NEW, LEVL V, 60-74 MIN: ICD-10-PCS | Mod: S$GLB,,, | Performed by: NURSE PRACTITIONER

## 2022-01-01 PROCEDURE — 84478 ASSAY OF TRIGLYCERIDES: CPT | Performed by: STUDENT IN AN ORGANIZED HEALTH CARE EDUCATION/TRAINING PROGRAM

## 2022-01-01 RX ORDER — PROPOFOL 10 MG/ML
0-50 INJECTION, EMULSION INTRAVENOUS CONTINUOUS
Status: DISCONTINUED | OUTPATIENT
Start: 2022-01-01 | End: 2022-01-01 | Stop reason: HOSPADM

## 2022-01-01 RX ORDER — HYDROCODONE BITARTRATE AND ACETAMINOPHEN 500; 5 MG/1; MG/1
TABLET ORAL
Status: DISCONTINUED | OUTPATIENT
Start: 2022-01-01 | End: 2022-01-01

## 2022-01-01 RX ORDER — ENOXAPARIN SODIUM 100 MG/ML
30 INJECTION SUBCUTANEOUS EVERY 24 HOURS
Status: DISCONTINUED | OUTPATIENT
Start: 2022-01-01 | End: 2022-01-01

## 2022-01-01 RX ORDER — PIPERACILLIN SODIUM, TAZOBACTAM SODIUM 4; .5 G/20ML; G/20ML
INJECTION, POWDER, LYOPHILIZED, FOR SOLUTION INTRAVENOUS
Status: DISCONTINUED | OUTPATIENT
Start: 2022-01-01 | End: 2022-01-01

## 2022-01-01 RX ORDER — SODIUM CHLORIDE 9 MG/ML
INJECTION, SOLUTION INTRAVENOUS CONTINUOUS
Status: DISCONTINUED | OUTPATIENT
Start: 2022-01-01 | End: 2022-01-01

## 2022-01-01 RX ORDER — PHENYLEPHRINE HCL IN 0.9% NACL 1 MG/10 ML
SYRINGE (ML) INTRAVENOUS
Status: DISPENSED
Start: 2022-01-01 | End: 2022-01-01

## 2022-01-01 RX ORDER — FENTANYL CITRATE-0.9 % NACL/PF 10 MCG/ML
0-250 PLASTIC BAG, INJECTION (ML) INTRAVENOUS CONTINUOUS
Status: DISCONTINUED | OUTPATIENT
Start: 2022-01-01 | End: 2022-01-01 | Stop reason: HOSPADM

## 2022-01-01 RX ORDER — LEVALBUTEROL INHALATION SOLUTION 0.63 MG/3ML
0.63 SOLUTION RESPIRATORY (INHALATION)
Status: DISCONTINUED | OUTPATIENT
Start: 2022-01-01 | End: 2022-01-01

## 2022-01-01 RX ORDER — SODIUM CHLORIDE 0.9 % (FLUSH) 0.9 %
10 SYRINGE (ML) INJECTION
Status: DISCONTINUED | OUTPATIENT
Start: 2022-01-01 | End: 2022-01-01 | Stop reason: HOSPADM

## 2022-01-01 RX ORDER — BUPIVACAINE HYDROCHLORIDE 2.5 MG/ML
INJECTION, SOLUTION EPIDURAL; INFILTRATION; INTRACAUDAL
Status: DISCONTINUED | OUTPATIENT
Start: 2022-01-01 | End: 2022-01-01 | Stop reason: HOSPADM

## 2022-01-01 RX ORDER — CALCIUM GLUCONATE 20 MG/ML
3 INJECTION, SOLUTION INTRAVENOUS
Status: DISCONTINUED | OUTPATIENT
Start: 2022-01-01 | End: 2022-01-01

## 2022-01-01 RX ORDER — FUROSEMIDE 10 MG/ML
40 INJECTION INTRAMUSCULAR; INTRAVENOUS
Status: COMPLETED | OUTPATIENT
Start: 2022-01-01 | End: 2022-01-01

## 2022-01-01 RX ORDER — ONDANSETRON 8 MG/1
8 TABLET, ORALLY DISINTEGRATING ORAL EVERY 6 HOURS PRN
Status: DISCONTINUED | OUTPATIENT
Start: 2022-01-01 | End: 2022-01-01 | Stop reason: HOSPADM

## 2022-01-01 RX ORDER — ALBUMIN HUMAN 250 G/1000ML
25 SOLUTION INTRAVENOUS ONCE
Status: COMPLETED | OUTPATIENT
Start: 2022-01-01 | End: 2022-01-01

## 2022-01-01 RX ORDER — CALCIUM GLUCONATE 20 MG/ML
1 INJECTION, SOLUTION INTRAVENOUS
Status: DISCONTINUED | OUTPATIENT
Start: 2022-01-01 | End: 2022-01-01 | Stop reason: HOSPADM

## 2022-01-01 RX ORDER — PROPOFOL 10 MG/ML
VIAL (ML) INTRAVENOUS
Status: DISCONTINUED | OUTPATIENT
Start: 2022-01-01 | End: 2022-01-01

## 2022-01-01 RX ORDER — DEXAMETHASONE SODIUM PHOSPHATE 100 MG/10ML
40 INJECTION INTRAMUSCULAR; INTRAVENOUS DAILY
Status: DISCONTINUED | OUTPATIENT
Start: 2022-01-01 | End: 2022-01-01

## 2022-01-01 RX ORDER — SODIUM CHLORIDE FOR INHALATION 3 %
4 VIAL, NEBULIZER (ML) INHALATION EVERY 8 HOURS
Status: DISCONTINUED | OUTPATIENT
Start: 2022-01-01 | End: 2022-01-01 | Stop reason: HOSPADM

## 2022-01-01 RX ORDER — LIDOCAINE HYDROCHLORIDE 10 MG/ML
1 INJECTION, SOLUTION EPIDURAL; INFILTRATION; INTRACAUDAL; PERINEURAL ONCE
Status: COMPLETED | OUTPATIENT
Start: 2022-01-01 | End: 2022-01-01

## 2022-01-01 RX ORDER — POTASSIUM CHLORIDE 7.45 MG/ML
60 INJECTION INTRAVENOUS
Status: DISCONTINUED | OUTPATIENT
Start: 2022-01-01 | End: 2022-01-01

## 2022-01-01 RX ORDER — NOREPINEPHRINE BITARTRATE/D5W 4MG/250ML
PLASTIC BAG, INJECTION (ML) INTRAVENOUS
Status: COMPLETED
Start: 2022-01-01 | End: 2022-01-01

## 2022-01-01 RX ORDER — POTASSIUM CHLORIDE 14.9 MG/ML
60 INJECTION INTRAVENOUS
Status: DISCONTINUED | OUTPATIENT
Start: 2022-01-01 | End: 2022-01-01 | Stop reason: HOSPADM

## 2022-01-01 RX ORDER — IBUPROFEN 200 MG
1 TABLET ORAL DAILY
Status: DISCONTINUED | OUTPATIENT
Start: 2022-01-01 | End: 2022-01-01 | Stop reason: HOSPADM

## 2022-01-01 RX ORDER — KETAMINE HCL IN 0.9 % NACL 50 MG/5 ML
SYRINGE (ML) INTRAVENOUS
Status: DISCONTINUED | OUTPATIENT
Start: 2022-01-01 | End: 2022-01-01

## 2022-01-01 RX ORDER — INDOMETHACIN 25 MG/1
50 CAPSULE ORAL ONCE
Status: COMPLETED | OUTPATIENT
Start: 2022-01-01 | End: 2022-01-01

## 2022-01-01 RX ORDER — LEVALBUTEROL INHALATION SOLUTION 0.63 MG/3ML
0.63 SOLUTION RESPIRATORY (INHALATION) EVERY 4 HOURS
Status: DISCONTINUED | OUTPATIENT
Start: 2022-01-01 | End: 2022-01-01 | Stop reason: HOSPADM

## 2022-01-01 RX ORDER — ALBUMIN HUMAN 50 G/1000ML
25 SOLUTION INTRAVENOUS ONCE
Status: COMPLETED | OUTPATIENT
Start: 2022-01-01 | End: 2022-01-01

## 2022-01-01 RX ORDER — METOPROLOL TARTRATE 1 MG/ML
5 INJECTION, SOLUTION INTRAVENOUS ONCE
Status: CANCELLED | OUTPATIENT
Start: 2022-01-01

## 2022-01-01 RX ORDER — HYDROMORPHONE HYDROCHLORIDE 1 MG/ML
1 INJECTION, SOLUTION INTRAMUSCULAR; INTRAVENOUS; SUBCUTANEOUS EVERY 6 HOURS PRN
Status: DISCONTINUED | OUTPATIENT
Start: 2022-01-01 | End: 2022-01-01

## 2022-01-01 RX ORDER — DILTIAZEM HYDROCHLORIDE 5 MG/ML
0.25 INJECTION INTRAVENOUS ONCE
Status: COMPLETED | OUTPATIENT
Start: 2022-01-01 | End: 2022-01-01

## 2022-01-01 RX ORDER — HYDROMORPHONE HCL IN 0.9% NACL 6 MG/30 ML
PATIENT CONTROLLED ANALGESIA SYRINGE INTRAVENOUS CONTINUOUS
Status: DISCONTINUED | OUTPATIENT
Start: 2022-01-01 | End: 2022-01-01

## 2022-01-01 RX ORDER — FENTANYL CITRATE-0.9 % NACL/PF 10 MCG/ML
0-200 PLASTIC BAG, INJECTION (ML) INTRAVENOUS CONTINUOUS
Status: DISCONTINUED | OUTPATIENT
Start: 2022-01-01 | End: 2022-01-01

## 2022-01-01 RX ORDER — FUROSEMIDE 10 MG/ML
20 INJECTION INTRAMUSCULAR; INTRAVENOUS EVERY 6 HOURS
Status: DISCONTINUED | OUTPATIENT
Start: 2022-01-01 | End: 2022-01-01

## 2022-01-01 RX ORDER — SUCCINYLCHOLINE CHLORIDE 20 MG/ML
INJECTION INTRAMUSCULAR; INTRAVENOUS CODE/TRAUMA/SEDATION MEDICATION
Status: COMPLETED | OUTPATIENT
Start: 2022-01-01 | End: 2022-01-01

## 2022-01-01 RX ORDER — ESMOLOL HYDROCHLORIDE 10 MG/ML
INJECTION INTRAVENOUS
Status: DISCONTINUED | OUTPATIENT
Start: 2022-01-01 | End: 2022-01-01

## 2022-01-01 RX ORDER — POTASSIUM CHLORIDE 29.8 MG/ML
40 INJECTION INTRAVENOUS ONCE
Status: COMPLETED | OUTPATIENT
Start: 2022-01-01 | End: 2022-01-01

## 2022-01-01 RX ORDER — PROPOFOL 10 MG/ML
VIAL (ML) INTRAVENOUS
Status: COMPLETED
Start: 2022-01-01 | End: 2022-01-01

## 2022-01-01 RX ORDER — SODIUM CHLORIDE 0.9 % (FLUSH) 0.9 %
10 SYRINGE (ML) INJECTION EVERY 6 HOURS
Status: DISCONTINUED | OUTPATIENT
Start: 2022-01-01 | End: 2022-01-01 | Stop reason: HOSPADM

## 2022-01-01 RX ORDER — FENTANYL CITRATE-0.9 % NACL/PF 10 MCG/ML
0-225 PLASTIC BAG, INJECTION (ML) INTRAVENOUS CONTINUOUS
Status: DISCONTINUED | OUTPATIENT
Start: 2022-01-01 | End: 2022-01-01

## 2022-01-01 RX ORDER — FENTANYL CITRATE 50 UG/ML
50 INJECTION, SOLUTION INTRAMUSCULAR; INTRAVENOUS
Status: DISCONTINUED | OUTPATIENT
Start: 2022-01-01 | End: 2022-01-01 | Stop reason: HOSPADM

## 2022-01-01 RX ORDER — LIDOCAINE HYDROCHLORIDE 20 MG/ML
INJECTION, SOLUTION EPIDURAL; INFILTRATION; INTRACAUDAL; PERINEURAL
Status: DISCONTINUED | OUTPATIENT
Start: 2022-01-01 | End: 2022-01-01

## 2022-01-01 RX ORDER — ENOXAPARIN SODIUM 100 MG/ML
30 INJECTION SUBCUTANEOUS EVERY 24 HOURS
Status: CANCELLED | OUTPATIENT
Start: 2022-01-01

## 2022-01-01 RX ORDER — CALCIUM GLUCONATE 20 MG/ML
2 INJECTION, SOLUTION INTRAVENOUS
Status: DISCONTINUED | OUTPATIENT
Start: 2022-01-01 | End: 2022-01-01 | Stop reason: HOSPADM

## 2022-01-01 RX ORDER — MAGNESIUM SULFATE HEPTAHYDRATE 40 MG/ML
4 INJECTION, SOLUTION INTRAVENOUS
Status: DISCONTINUED | OUTPATIENT
Start: 2022-01-01 | End: 2022-01-01 | Stop reason: HOSPADM

## 2022-01-01 RX ORDER — ONDANSETRON 2 MG/ML
INJECTION INTRAMUSCULAR; INTRAVENOUS
Status: DISCONTINUED | OUTPATIENT
Start: 2022-01-01 | End: 2022-01-01

## 2022-01-01 RX ORDER — ATROPINE SULFATE 0.1 MG/ML
INJECTION INTRAVENOUS CODE/TRAUMA/SEDATION MEDICATION
Status: COMPLETED | OUTPATIENT
Start: 2022-01-01 | End: 2022-01-01

## 2022-01-01 RX ORDER — FENTANYL CITRATE 50 UG/ML
INJECTION, SOLUTION INTRAMUSCULAR; INTRAVENOUS
Status: DISCONTINUED | OUTPATIENT
Start: 2022-01-01 | End: 2022-01-01

## 2022-01-01 RX ORDER — FENTANYL CITRATE 50 UG/ML
50 INJECTION, SOLUTION INTRAMUSCULAR; INTRAVENOUS
Status: DISCONTINUED | OUTPATIENT
Start: 2022-01-01 | End: 2022-01-01

## 2022-01-01 RX ORDER — METOPROLOL TARTRATE 1 MG/ML
5 INJECTION, SOLUTION INTRAVENOUS ONCE
Status: COMPLETED | OUTPATIENT
Start: 2022-01-01 | End: 2022-01-01

## 2022-01-01 RX ORDER — POTASSIUM CHLORIDE 29.8 MG/ML
80 INJECTION INTRAVENOUS
Status: DISCONTINUED | OUTPATIENT
Start: 2022-01-01 | End: 2022-01-01 | Stop reason: HOSPADM

## 2022-01-01 RX ORDER — ROCURONIUM BROMIDE 10 MG/ML
INJECTION, SOLUTION INTRAVENOUS
Status: DISCONTINUED
Start: 2022-01-01 | End: 2022-01-01 | Stop reason: WASHOUT

## 2022-01-01 RX ORDER — ESMOLOL HYDROCHLORIDE 20 MG/ML
0-300 INJECTION, SOLUTION INTRAVENOUS CONTINUOUS
Status: DISCONTINUED | OUTPATIENT
Start: 2022-01-01 | End: 2022-01-01

## 2022-01-01 RX ORDER — POTASSIUM CHLORIDE 14.9 MG/ML
60 INJECTION INTRAVENOUS
Status: DISCONTINUED | OUTPATIENT
Start: 2022-01-01 | End: 2022-01-01

## 2022-01-01 RX ORDER — FUROSEMIDE 10 MG/ML
20 INJECTION INTRAMUSCULAR; INTRAVENOUS ONCE
Status: COMPLETED | OUTPATIENT
Start: 2022-01-01 | End: 2022-01-01

## 2022-01-01 RX ORDER — HYDRALAZINE HYDROCHLORIDE 20 MG/ML
10 INJECTION INTRAMUSCULAR; INTRAVENOUS EVERY 6 HOURS PRN
Status: DISCONTINUED | OUTPATIENT
Start: 2022-01-01 | End: 2022-01-01

## 2022-01-01 RX ORDER — POTASSIUM CHLORIDE 29.8 MG/ML
80 INJECTION INTRAVENOUS
Status: DISCONTINUED | OUTPATIENT
Start: 2022-01-01 | End: 2022-01-01

## 2022-01-01 RX ORDER — FUROSEMIDE 10 MG/ML
40 INJECTION INTRAMUSCULAR; INTRAVENOUS ONCE
Status: DISCONTINUED | OUTPATIENT
Start: 2022-01-01 | End: 2022-01-01

## 2022-01-01 RX ORDER — NOREPINEPHRINE BITARTRATE/D5W 4MG/250ML
0-3 PLASTIC BAG, INJECTION (ML) INTRAVENOUS CONTINUOUS
Status: DISCONTINUED | OUTPATIENT
Start: 2022-01-01 | End: 2022-01-01

## 2022-01-01 RX ORDER — DEXAMETHASONE SODIUM PHOSPHATE 4 MG/ML
INJECTION, SOLUTION INTRA-ARTICULAR; INTRALESIONAL; INTRAMUSCULAR; INTRAVENOUS; SOFT TISSUE
Status: DISCONTINUED | OUTPATIENT
Start: 2022-01-01 | End: 2022-01-01

## 2022-01-01 RX ORDER — ENOXAPARIN SODIUM 100 MG/ML
30 INJECTION SUBCUTANEOUS
Status: CANCELLED | OUTPATIENT
Start: 2022-01-01

## 2022-01-01 RX ORDER — SODIUM CHLORIDE 9 MG/ML
INJECTION, SOLUTION INTRAVENOUS CONTINUOUS
Status: DISCONTINUED | OUTPATIENT
Start: 2022-01-01 | End: 2022-01-01 | Stop reason: HOSPADM

## 2022-01-01 RX ORDER — PROPOFOL 10 MG/ML
INJECTION, EMULSION INTRAVENOUS
Status: COMPLETED
Start: 2022-01-01 | End: 2022-01-01

## 2022-01-01 RX ORDER — MAGNESIUM SULFATE HEPTAHYDRATE 40 MG/ML
2 INJECTION, SOLUTION INTRAVENOUS
Status: DISCONTINUED | OUTPATIENT
Start: 2022-01-01 | End: 2022-01-01

## 2022-01-01 RX ORDER — CALCIUM GLUCONATE 20 MG/ML
2 INJECTION, SOLUTION INTRAVENOUS
Status: DISCONTINUED | OUTPATIENT
Start: 2022-01-01 | End: 2022-01-01

## 2022-01-01 RX ORDER — DIPHENHYDRAMINE HYDROCHLORIDE 50 MG/ML
12.5 INJECTION INTRAMUSCULAR; INTRAVENOUS ONCE
Status: DISCONTINUED | OUTPATIENT
Start: 2022-01-01 | End: 2022-01-01

## 2022-01-01 RX ORDER — NOREPINEPHRINE BITARTRATE/D5W 4MG/250ML
0-3 PLASTIC BAG, INJECTION (ML) INTRAVENOUS CONTINUOUS
Status: DISCONTINUED | OUTPATIENT
Start: 2022-01-01 | End: 2022-01-01 | Stop reason: HOSPADM

## 2022-01-01 RX ORDER — PHENYLEPHRINE HCL IN 0.9% NACL 1 MG/10 ML
SYRINGE (ML) INTRAVENOUS
Status: COMPLETED
Start: 2022-01-01 | End: 2022-01-01

## 2022-01-01 RX ORDER — ROCURONIUM BROMIDE 10 MG/ML
INJECTION, SOLUTION INTRAVENOUS
Status: DISCONTINUED | OUTPATIENT
Start: 2022-01-01 | End: 2022-01-01

## 2022-01-01 RX ORDER — ESMOLOL HYDROCHLORIDE 20 MG/ML
0-300 INJECTION, SOLUTION INTRAVENOUS CONTINUOUS
Status: DISCONTINUED | OUTPATIENT
Start: 2022-01-01 | End: 2022-01-01 | Stop reason: HOSPADM

## 2022-01-01 RX ORDER — EPHEDRINE SULFATE 50 MG/ML
INJECTION, SOLUTION INTRAVENOUS
Status: DISCONTINUED | OUTPATIENT
Start: 2022-01-01 | End: 2022-01-01

## 2022-01-01 RX ORDER — ALBUMIN HUMAN 50 G/1000ML
SOLUTION INTRAVENOUS
Status: COMPLETED
Start: 2022-01-01 | End: 2022-01-01

## 2022-01-01 RX ORDER — NEOSTIGMINE METHYLSULFATE 0.5 MG/ML
INJECTION, SOLUTION INTRAVENOUS
Status: DISCONTINUED | OUTPATIENT
Start: 2022-01-01 | End: 2022-01-01

## 2022-01-01 RX ORDER — MUPIROCIN 20 MG/G
OINTMENT TOPICAL 2 TIMES DAILY
Status: DISCONTINUED | OUTPATIENT
Start: 2022-01-01 | End: 2022-01-01 | Stop reason: HOSPADM

## 2022-01-01 RX ORDER — FAMOTIDINE 10 MG/ML
20 INJECTION INTRAVENOUS 2 TIMES DAILY
Status: DISCONTINUED | OUTPATIENT
Start: 2022-01-01 | End: 2022-01-01 | Stop reason: HOSPADM

## 2022-01-01 RX ORDER — FUROSEMIDE 10 MG/ML
40 INJECTION INTRAMUSCULAR; INTRAVENOUS ONCE
Status: COMPLETED | OUTPATIENT
Start: 2022-01-01 | End: 2022-01-01

## 2022-01-01 RX ORDER — SUCCINYLCHOLINE CHLORIDE 20 MG/ML
INJECTION INTRAMUSCULAR; INTRAVENOUS
Status: DISPENSED
Start: 2022-01-01 | End: 2022-01-01

## 2022-01-01 RX ORDER — CALCIUM GLUCONATE 20 MG/ML
1 INJECTION, SOLUTION INTRAVENOUS
Status: DISCONTINUED | OUTPATIENT
Start: 2022-01-01 | End: 2022-01-01

## 2022-01-01 RX ORDER — POTASSIUM CHLORIDE 7.45 MG/ML
80 INJECTION INTRAVENOUS
Status: DISCONTINUED | OUTPATIENT
Start: 2022-01-01 | End: 2022-01-01

## 2022-01-01 RX ORDER — PHENYLEPHRINE HCL IN 0.9% NACL 1 MG/10 ML
SYRINGE (ML) INTRAVENOUS
Status: DISCONTINUED | OUTPATIENT
Start: 2022-01-01 | End: 2022-01-01

## 2022-01-01 RX ORDER — ETOMIDATE 2 MG/ML
INJECTION INTRAVENOUS
Status: DISPENSED
Start: 2022-01-01 | End: 2022-01-01

## 2022-01-01 RX ORDER — CALCIUM GLUCONATE 20 MG/ML
3 INJECTION, SOLUTION INTRAVENOUS
Status: DISCONTINUED | OUTPATIENT
Start: 2022-01-01 | End: 2022-01-01 | Stop reason: HOSPADM

## 2022-01-01 RX ORDER — CALCIUM CHLORIDE INJECTION 100 MG/ML
INJECTION, SOLUTION INTRAVENOUS
Status: DISCONTINUED | OUTPATIENT
Start: 2022-01-01 | End: 2022-01-01

## 2022-01-01 RX ORDER — POTASSIUM CHLORIDE 29.8 MG/ML
40 INJECTION INTRAVENOUS
Status: DISCONTINUED | OUTPATIENT
Start: 2022-01-01 | End: 2022-01-01 | Stop reason: HOSPADM

## 2022-01-01 RX ORDER — METOPROLOL TARTRATE 1 MG/ML
INJECTION, SOLUTION INTRAVENOUS
Status: COMPLETED
Start: 2022-01-01 | End: 2022-01-01

## 2022-01-01 RX ORDER — MIDAZOLAM HYDROCHLORIDE 1 MG/ML
INJECTION, SOLUTION INTRAMUSCULAR; INTRAVENOUS
Status: DISCONTINUED | OUTPATIENT
Start: 2022-01-01 | End: 2022-01-01

## 2022-01-01 RX ORDER — POTASSIUM CHLORIDE 29.8 MG/ML
40 INJECTION INTRAVENOUS
Status: DISCONTINUED | OUTPATIENT
Start: 2022-01-01 | End: 2022-01-01

## 2022-01-01 RX ORDER — ENOXAPARIN SODIUM 100 MG/ML
40 INJECTION SUBCUTANEOUS EVERY 24 HOURS
Status: DISCONTINUED | OUTPATIENT
Start: 2022-01-01 | End: 2022-01-01

## 2022-01-01 RX ORDER — LEVALBUTEROL INHALATION SOLUTION 0.63 MG/3ML
0.63 SOLUTION RESPIRATORY (INHALATION) ONCE
Status: COMPLETED | OUTPATIENT
Start: 2022-01-01 | End: 2022-01-01

## 2022-01-01 RX ORDER — POTASSIUM CHLORIDE 7.45 MG/ML
40 INJECTION INTRAVENOUS
Status: DISCONTINUED | OUTPATIENT
Start: 2022-01-01 | End: 2022-01-01

## 2022-01-01 RX ORDER — CEFAZOLIN SODIUM/WATER 2 G/20 ML
2 SYRINGE (ML) INTRAVENOUS
Status: DISCONTINUED | OUTPATIENT
Start: 2022-01-01 | End: 2022-01-01

## 2022-01-01 RX ORDER — MAGNESIUM SULFATE HEPTAHYDRATE 40 MG/ML
4 INJECTION, SOLUTION INTRAVENOUS
Status: DISCONTINUED | OUTPATIENT
Start: 2022-01-01 | End: 2022-01-01

## 2022-01-01 RX ORDER — ENOXAPARIN SODIUM 100 MG/ML
30 INJECTION SUBCUTANEOUS
Status: DISCONTINUED | OUTPATIENT
Start: 2022-01-01 | End: 2022-01-01

## 2022-01-01 RX ORDER — ALBUMIN HUMAN 50 G/1000ML
SOLUTION INTRAVENOUS CONTINUOUS PRN
Status: DISCONTINUED | OUTPATIENT
Start: 2022-01-01 | End: 2022-01-01

## 2022-01-01 RX ORDER — DEXAMETHASONE SODIUM PHOSPHATE 4 MG/ML
40 INJECTION, SOLUTION INTRA-ARTICULAR; INTRALESIONAL; INTRAMUSCULAR; INTRAVENOUS; SOFT TISSUE DAILY
Status: DISCONTINUED | OUTPATIENT
Start: 2022-01-01 | End: 2022-01-01

## 2022-01-01 RX ORDER — METOPROLOL TARTRATE 1 MG/ML
INJECTION, SOLUTION INTRAVENOUS
Status: DISPENSED
Start: 2022-01-01 | End: 2022-01-01

## 2022-01-01 RX ORDER — ALBUMIN HUMAN 50 G/1000ML
12.5 SOLUTION INTRAVENOUS ONCE
Status: COMPLETED | OUTPATIENT
Start: 2022-01-01 | End: 2022-01-01

## 2022-01-01 RX ORDER — MAGNESIUM SULFATE HEPTAHYDRATE 40 MG/ML
2 INJECTION, SOLUTION INTRAVENOUS
Status: DISCONTINUED | OUTPATIENT
Start: 2022-01-01 | End: 2022-01-01 | Stop reason: HOSPADM

## 2022-01-01 RX ORDER — NALOXONE HCL 0.4 MG/ML
0.02 VIAL (ML) INJECTION
Status: DISCONTINUED | OUTPATIENT
Start: 2022-01-01 | End: 2022-01-01 | Stop reason: HOSPADM

## 2022-01-01 RX ORDER — MAGNESIUM SULFATE HEPTAHYDRATE 40 MG/ML
2 INJECTION, SOLUTION INTRAVENOUS ONCE
Status: COMPLETED | OUTPATIENT
Start: 2022-01-01 | End: 2022-01-01

## 2022-01-01 RX ADMIN — SODIUM CHLORIDE SOLN NEBU 3% 4 ML: 3 NEBU SOLN at 02:11

## 2022-01-01 RX ADMIN — POTASSIUM CHLORIDE 40 MEQ: 400 INJECTION, SOLUTION INTRAVENOUS at 12:11

## 2022-01-01 RX ADMIN — AMIODARONE HYDROCHLORIDE 1 MG/MIN: 1.8 INJECTION, SOLUTION INTRAVENOUS at 03:11

## 2022-01-01 RX ADMIN — HYDROMORPHONE HYDROCHLORIDE 1 MG: 1 INJECTION, SOLUTION INTRAMUSCULAR; INTRAVENOUS; SUBCUTANEOUS at 04:11

## 2022-01-01 RX ADMIN — Medication 10 ML: at 12:11

## 2022-01-01 RX ADMIN — Medication 10 MG: at 02:11

## 2022-01-01 RX ADMIN — ONDANSETRON 4 MG: 2 INJECTION INTRAMUSCULAR; INTRAVENOUS at 04:11

## 2022-01-01 RX ADMIN — SODIUM PHOSPHATE, MONOBASIC, MONOHYDRATE 30 MMOL: 276; 142 INJECTION, SOLUTION INTRAVENOUS at 05:11

## 2022-01-01 RX ADMIN — ALBUMIN (HUMAN) 25 G: 12.5 SOLUTION INTRAVENOUS at 10:11

## 2022-01-01 RX ADMIN — MAGNESIUM SULFATE 2 G: 2 INJECTION INTRAVENOUS at 04:11

## 2022-01-01 RX ADMIN — LEVALBUTEROL HYDROCHLORIDE 0.63 MG: 0.63 SOLUTION RESPIRATORY (INHALATION) at 02:11

## 2022-01-01 RX ADMIN — ROCURONIUM BROMIDE 20 MG: 10 INJECTION INTRAVENOUS at 02:11

## 2022-01-01 RX ADMIN — METOPROLOL TARTRATE 5 MG: 1 INJECTION, SOLUTION INTRAVENOUS at 10:11

## 2022-01-01 RX ADMIN — Medication 10 ML: at 11:11

## 2022-01-01 RX ADMIN — Medication 10 MG: at 10:11

## 2022-01-01 RX ADMIN — MAGNESIUM SULFATE HEPTAHYDRATE: 500 INJECTION, SOLUTION INTRAMUSCULAR; INTRAVENOUS at 10:11

## 2022-01-01 RX ADMIN — FENTANYL CITRATE 50 MCG: 50 INJECTION INTRAMUSCULAR; INTRAVENOUS at 12:11

## 2022-01-01 RX ADMIN — Medication 100 MCG: at 08:11

## 2022-01-01 RX ADMIN — PROPOFOL 100 MG: 10 INJECTION, EMULSION INTRAVENOUS at 08:11

## 2022-01-01 RX ADMIN — ENOXAPARIN SODIUM 30 MG: 30 INJECTION, SOLUTION INTRAVENOUS; SUBCUTANEOUS at 06:11

## 2022-01-01 RX ADMIN — ALBUMIN (HUMAN) 25 G: 12.5 SOLUTION INTRAVENOUS at 02:11

## 2022-01-01 RX ADMIN — POTASSIUM CHLORIDE 40 MEQ: 400 INJECTION, SOLUTION INTRAVENOUS at 05:11

## 2022-01-01 RX ADMIN — LEVALBUTEROL HYDROCHLORIDE 0.63 MG: 0.63 SOLUTION RESPIRATORY (INHALATION) at 10:11

## 2022-01-01 RX ADMIN — ESMOLOL HYDROCHLORIDE 35 MCG/KG/MIN: 20 INJECTION INTRAVENOUS at 02:11

## 2022-01-01 RX ADMIN — METOROPROLOL TARTRATE 5 MG: 5 INJECTION, SOLUTION INTRAVENOUS at 10:11

## 2022-01-01 RX ADMIN — NOREPINEPHRINE BITARTRATE 0.06 MCG/KG/MIN: 4 INJECTION, SOLUTION INTRAVENOUS at 03:11

## 2022-01-01 RX ADMIN — HUMAN IMMUNOGLOBULIN G 60 G: 40 LIQUID INTRAVENOUS at 09:11

## 2022-01-01 RX ADMIN — FUROSEMIDE 20 MG: 10 INJECTION, SOLUTION INTRAMUSCULAR; INTRAVENOUS at 06:11

## 2022-01-01 RX ADMIN — HYDRALAZINE HYDROCHLORIDE 10 MG: 20 INJECTION, SOLUTION INTRAMUSCULAR; INTRAVENOUS at 09:11

## 2022-01-01 RX ADMIN — FAMOTIDINE 20 MG: 10 INJECTION, SOLUTION INTRAVENOUS at 09:11

## 2022-01-01 RX ADMIN — PROPOFOL 30 MCG/KG/MIN: 10 INJECTION, EMULSION INTRAVENOUS at 04:11

## 2022-01-01 RX ADMIN — LEVALBUTEROL HYDROCHLORIDE 0.63 MG: 0.63 SOLUTION RESPIRATORY (INHALATION) at 07:11

## 2022-01-01 RX ADMIN — SUCCINYLCHOLINE CHLORIDE 100 MG: 20 INJECTION, SOLUTION INTRAMUSCULAR; INTRAVENOUS at 02:11

## 2022-01-01 RX ADMIN — PROPOFOL 30 MCG/KG/MIN: 10 INJECTION, EMULSION INTRAVENOUS at 03:11

## 2022-01-01 RX ADMIN — Medication 1 PATCH: at 08:11

## 2022-01-01 RX ADMIN — FUROSEMIDE 20 MG: 10 INJECTION, SOLUTION INTRAMUSCULAR; INTRAVENOUS at 11:11

## 2022-01-01 RX ADMIN — SODIUM CHLORIDE SOLN NEBU 3% 4 ML: 3 NEBU SOLN at 09:11

## 2022-01-01 RX ADMIN — PROPOFOL 50 MCG/KG/MIN: 10 INJECTION, EMULSION INTRAVENOUS at 07:11

## 2022-01-01 RX ADMIN — LIDOCAINE HYDROCHLORIDE 100 MG: 20 INJECTION, SOLUTION EPIDURAL; INFILTRATION; INTRACAUDAL; PERINEURAL at 08:11

## 2022-01-01 RX ADMIN — FUROSEMIDE 20 MG: 10 INJECTION, SOLUTION INTRAMUSCULAR; INTRAVENOUS at 08:11

## 2022-01-01 RX ADMIN — PIPERACILLIN SODIUM AND TAZOBACTAM SODIUM 4 G: 2; .25 INJECTION, POWDER, LYOPHILIZED, FOR SOLUTION INTRAVENOUS at 08:11

## 2022-01-01 RX ADMIN — DILTIAZEM HYDROCHLORIDE 12 MG: 5 INJECTION INTRAVENOUS at 03:11

## 2022-01-01 RX ADMIN — ESMOLOL HYDROCHLORIDE 50 MCG/KG/MIN: 20 INJECTION INTRAVENOUS at 06:11

## 2022-01-01 RX ADMIN — FENTANYL CITRATE 50 MCG: 50 INJECTION, SOLUTION INTRAMUSCULAR; INTRAVENOUS at 11:11

## 2022-01-01 RX ADMIN — LEVALBUTEROL HYDROCHLORIDE 0.63 MG: 0.63 SOLUTION RESPIRATORY (INHALATION) at 09:11

## 2022-01-01 RX ADMIN — ROCURONIUM BROMIDE 10 MG: 10 INJECTION INTRAVENOUS at 11:11

## 2022-01-01 RX ADMIN — CISATRACURIUM BESYLATE 1.5 MCG/KG/MIN: 10 INJECTION, SOLUTION INTRAVENOUS at 03:11

## 2022-01-01 RX ADMIN — LEVALBUTEROL HYDROCHLORIDE 0.63 MG: 0.63 SOLUTION RESPIRATORY (INHALATION) at 06:11

## 2022-01-01 RX ADMIN — ESMOLOL HYDROCHLORIDE 20 MG: 100 INJECTION, SOLUTION INTRAVENOUS at 05:11

## 2022-01-01 RX ADMIN — FAMOTIDINE 20 MG: 10 INJECTION, SOLUTION INTRAVENOUS at 08:11

## 2022-01-01 RX ADMIN — SUGAMMADEX 200 MG: 100 INJECTION, SOLUTION INTRAVENOUS at 05:11

## 2022-01-01 RX ADMIN — AMIODARONE HYDROCHLORIDE 0.5 MG/MIN: 1.8 INJECTION, SOLUTION INTRAVENOUS at 11:11

## 2022-01-01 RX ADMIN — MAGNESIUM SULFATE 2 G: 2 INJECTION INTRAVENOUS at 01:11

## 2022-01-01 RX ADMIN — ENOXAPARIN SODIUM 30 MG: 30 INJECTION, SOLUTION INTRAVENOUS; SUBCUTANEOUS at 10:11

## 2022-01-01 RX ADMIN — SODIUM PHOSPHATE, MONOBASIC, MONOHYDRATE 15 MMOL: 276; 142 INJECTION, SOLUTION INTRAVENOUS at 05:11

## 2022-01-01 RX ADMIN — MUPIROCIN: 20 OINTMENT TOPICAL at 08:11

## 2022-01-01 RX ADMIN — DEXAMETHASONE SODIUM PHOSPHATE 40 MG: 4 INJECTION, SOLUTION INTRA-ARTICULAR; INTRALESIONAL; INTRAMUSCULAR; INTRAVENOUS; SOFT TISSUE at 11:11

## 2022-01-01 RX ADMIN — POTASSIUM CHLORIDE 40 MEQ: 400 INJECTION, SOLUTION INTRAVENOUS at 02:11

## 2022-01-01 RX ADMIN — Medication 10 ML: at 06:11

## 2022-01-01 RX ADMIN — MAGNESIUM SULFATE HEPTAHYDRATE: 500 INJECTION, SOLUTION INTRAMUSCULAR; INTRAVENOUS at 08:11

## 2022-01-01 RX ADMIN — ALBUMIN HUMAN: 50 SOLUTION INTRAVENOUS at 11:11

## 2022-01-01 RX ADMIN — MUPIROCIN: 20 OINTMENT TOPICAL at 09:11

## 2022-01-01 RX ADMIN — SODIUM CHLORIDE: 0.9 INJECTION, SOLUTION INTRAVENOUS at 07:11

## 2022-01-01 RX ADMIN — MIDAZOLAM 2 MG: 1 INJECTION INTRAMUSCULAR; INTRAVENOUS at 08:11

## 2022-01-01 RX ADMIN — SODIUM CHLORIDE, SODIUM LACTATE, POTASSIUM CHLORIDE, AND CALCIUM CHLORIDE 1000 ML: .6; .31; .03; .02 INJECTION, SOLUTION INTRAVENOUS at 03:11

## 2022-01-01 RX ADMIN — FUROSEMIDE 20 MG: 10 INJECTION, SOLUTION INTRAMUSCULAR; INTRAVENOUS at 02:11

## 2022-01-01 RX ADMIN — POTASSIUM CHLORIDE 40 MEQ: 7.46 INJECTION, SOLUTION INTRAVENOUS at 11:11

## 2022-01-01 RX ADMIN — FUROSEMIDE 40 MG: 10 INJECTION, SOLUTION INTRAMUSCULAR; INTRAVENOUS at 08:11

## 2022-01-01 RX ADMIN — Medication 10 MG: at 12:11

## 2022-01-01 RX ADMIN — NOREPINEPHRINE BITARTRATE 0.04 MCG/KG/MIN: 4 INJECTION, SOLUTION INTRAVENOUS at 02:11

## 2022-01-01 RX ADMIN — AMIODARONE HYDROCHLORIDE 1 MG/MIN: 1.8 INJECTION, SOLUTION INTRAVENOUS at 08:11

## 2022-01-01 RX ADMIN — LIDOCAINE HYDROCHLORIDE 1 MG: 10 INJECTION, SOLUTION EPIDURAL; INFILTRATION; INTRACAUDAL; PERINEURAL at 07:11

## 2022-01-01 RX ADMIN — GLYCOPYRROLATE 0.6 MG: 0.2 INJECTION INTRAMUSCULAR; INTRAVENOUS at 04:11

## 2022-01-01 RX ADMIN — LEVALBUTEROL HYDROCHLORIDE 0.63 MG: 0.63 SOLUTION RESPIRATORY (INHALATION) at 03:11

## 2022-01-01 RX ADMIN — PROPOFOL 45 MCG/KG/MIN: 10 INJECTION, EMULSION INTRAVENOUS at 02:11

## 2022-01-01 RX ADMIN — AMIODARONE HYDROCHLORIDE 150 MG: 1.5 INJECTION, SOLUTION INTRAVENOUS at 03:11

## 2022-01-01 RX ADMIN — POTASSIUM CHLORIDE 40 MEQ: 400 INJECTION, SOLUTION INTRAVENOUS at 07:11

## 2022-01-01 RX ADMIN — ALBUMIN (HUMAN) 25 G: 12.5 SOLUTION INTRAVENOUS at 05:11

## 2022-01-01 RX ADMIN — ROCURONIUM BROMIDE 30 MG: 10 INJECTION INTRAVENOUS at 01:11

## 2022-01-01 RX ADMIN — Medication 10 ML: at 04:11

## 2022-01-01 RX ADMIN — ROCURONIUM BROMIDE 10 MG: 10 INJECTION INTRAVENOUS at 03:11

## 2022-01-01 RX ADMIN — LEVALBUTEROL HYDROCHLORIDE 0.63 MG: 0.63 SOLUTION RESPIRATORY (INHALATION) at 12:11

## 2022-01-01 RX ADMIN — PROPOFOL 15 MCG/KG/MIN: 10 INJECTION, EMULSION INTRAVENOUS at 06:11

## 2022-01-01 RX ADMIN — Medication 225 MCG/HR: at 04:11

## 2022-01-01 RX ADMIN — MAGNESIUM SULFATE 2 G: 2 INJECTION INTRAVENOUS at 05:11

## 2022-01-01 RX ADMIN — SODIUM CHLORIDE SOLN NEBU 3% 4 ML: 3 NEBU SOLN at 03:11

## 2022-01-01 RX ADMIN — ONDANSETRON 0.5 G: 2 INJECTION INTRAMUSCULAR; INTRAVENOUS at 04:11

## 2022-01-01 RX ADMIN — Medication 175 MCG/HR: at 04:11

## 2022-01-01 RX ADMIN — PIPERACILLIN SODIUM AND TAZOBACTAM SODIUM 4 G: 2; .25 INJECTION, POWDER, LYOPHILIZED, FOR SOLUTION INTRAVENOUS at 12:11

## 2022-01-01 RX ADMIN — ATROPINE SULFATE 0.25 MG: 0.1 INJECTION INTRAVENOUS at 02:11

## 2022-01-01 RX ADMIN — PROPOFOL 20 MCG/KG/MIN: 10 INJECTION, EMULSION INTRAVENOUS at 01:11

## 2022-01-01 RX ADMIN — Medication 1 PATCH: at 10:11

## 2022-01-01 RX ADMIN — SODIUM CHLORIDE, SODIUM GLUCONATE, SODIUM ACETATE, POTASSIUM CHLORIDE, MAGNESIUM CHLORIDE, SODIUM PHOSPHATE, DIBASIC, AND POTASSIUM PHOSPHATE: .53; .5; .37; .037; .03; .012; .00082 INJECTION, SOLUTION INTRAVENOUS at 04:11

## 2022-01-01 RX ADMIN — SODIUM CHLORIDE: 0.9 INJECTION, SOLUTION INTRAVENOUS at 03:11

## 2022-01-01 RX ADMIN — ESMOLOL HYDROCHLORIDE 50 MCG/KG/MIN: 20 INJECTION INTRAVENOUS at 12:11

## 2022-01-01 RX ADMIN — Medication 212.5 MCG/HR: at 02:11

## 2022-01-01 RX ADMIN — ALBUMIN HUMAN 25 G: 50 SOLUTION INTRAVENOUS at 02:11

## 2022-01-01 RX ADMIN — MAGNESIUM SULFATE 2 G: 2 INJECTION INTRAVENOUS at 09:11

## 2022-01-01 RX ADMIN — PROPOFOL 50 MCG/KG/MIN: 10 INJECTION, EMULSION INTRAVENOUS at 06:11

## 2022-01-01 RX ADMIN — PIPERACILLIN SODIUM AND TAZOBACTAM SODIUM 4.5 G: 4; .5 INJECTION, POWDER, LYOPHILIZED, FOR SOLUTION INTRAVENOUS at 02:11

## 2022-01-01 RX ADMIN — ALBUMIN HUMAN 25 G: 250 SOLUTION INTRAVENOUS at 04:11

## 2022-01-01 RX ADMIN — AMIODARONE HYDROCHLORIDE 1.5 MG/MIN: 1.8 INJECTION, SOLUTION INTRAVENOUS at 05:11

## 2022-01-01 RX ADMIN — PROPOFOL 45 MCG/KG/MIN: 10 INJECTION, EMULSION INTRAVENOUS at 07:11

## 2022-01-01 RX ADMIN — POTASSIUM CHLORIDE 40 MEQ: 29.8 INJECTION, SOLUTION INTRAVENOUS at 04:11

## 2022-01-01 RX ADMIN — FENTANYL CITRATE 50 MCG: 50 INJECTION, SOLUTION INTRAMUSCULAR; INTRAVENOUS at 04:11

## 2022-01-01 RX ADMIN — PIPERACILLIN SODIUM AND TAZOBACTAM SODIUM 4 G: 2; .25 INJECTION, POWDER, LYOPHILIZED, FOR SOLUTION INTRAVENOUS at 10:11

## 2022-01-01 RX ADMIN — PROPOFOL 70 MG: 10 INJECTION, EMULSION INTRAVENOUS at 03:11

## 2022-01-01 RX ADMIN — Medication 200 MCG/HR: at 04:11

## 2022-01-01 RX ADMIN — DEXAMETHASONE SODIUM PHOSPHATE 40 MG: 4 INJECTION, SOLUTION INTRA-ARTICULAR; INTRALESIONAL; INTRAMUSCULAR; INTRAVENOUS; SOFT TISSUE at 10:11

## 2022-01-01 RX ADMIN — Medication 30 MG: at 08:11

## 2022-01-01 RX ADMIN — FENTANYL CITRATE 100 MCG: 50 INJECTION INTRAMUSCULAR; INTRAVENOUS at 08:11

## 2022-01-01 RX ADMIN — FENTANYL CITRATE 50 MCG: 50 INJECTION, SOLUTION INTRAMUSCULAR; INTRAVENOUS at 09:11

## 2022-01-01 RX ADMIN — PROPOFOL 25 MCG/KG/MIN: 10 INJECTION, EMULSION INTRAVENOUS at 05:11

## 2022-01-01 RX ADMIN — ROCURONIUM BROMIDE 100 MG: 10 INJECTION INTRAVENOUS at 08:11

## 2022-01-01 RX ADMIN — Medication 10 ML: at 01:11

## 2022-01-01 RX ADMIN — FAMOTIDINE 20 MG: 10 INJECTION, SOLUTION INTRAVENOUS at 10:11

## 2022-01-01 RX ADMIN — SODIUM CHLORIDE 0.2 MCG/KG/MIN: 9 INJECTION, SOLUTION INTRAVENOUS at 09:11

## 2022-01-01 RX ADMIN — PROPOFOL 25 MCG/KG/MIN: 10 INJECTION, EMULSION INTRAVENOUS at 11:11

## 2022-01-01 RX ADMIN — FUROSEMIDE 40 MG: 10 INJECTION, SOLUTION INTRAMUSCULAR; INTRAVENOUS at 01:11

## 2022-01-01 RX ADMIN — Medication: at 07:11

## 2022-01-01 RX ADMIN — Medication 250 MCG/HR: at 01:11

## 2022-01-01 RX ADMIN — Medication: at 06:11

## 2022-01-01 RX ADMIN — ESMOLOL HYDROCHLORIDE 30 MCG/KG/MIN: 20 INJECTION INTRAVENOUS at 05:11

## 2022-01-01 RX ADMIN — SODIUM BICARBONATE 50 MEQ: 84 INJECTION, SOLUTION INTRAVENOUS at 03:11

## 2022-01-01 RX ADMIN — NOREPINEPHRINE BITARTRATE 0.02 MCG/KG/MIN: 4 INJECTION, SOLUTION INTRAVENOUS at 03:11

## 2022-01-01 RX ADMIN — FENTANYL CITRATE 50 MCG: 50 INJECTION, SOLUTION INTRAMUSCULAR; INTRAVENOUS at 06:11

## 2022-01-01 RX ADMIN — Medication 50 MCG: at 09:11

## 2022-01-01 RX ADMIN — Medication: at 01:11

## 2022-01-01 RX ADMIN — SODIUM CHLORIDE, SODIUM GLUCONATE, SODIUM ACETATE, POTASSIUM CHLORIDE, MAGNESIUM CHLORIDE, SODIUM PHOSPHATE, DIBASIC, AND POTASSIUM PHOSPHATE: .53; .5; .37; .037; .03; .012; .00082 INJECTION, SOLUTION INTRAVENOUS at 01:11

## 2022-01-01 RX ADMIN — FENTANYL CITRATE 50 MCG: 50 INJECTION INTRAMUSCULAR; INTRAVENOUS at 01:11

## 2022-01-01 RX ADMIN — FUROSEMIDE 20 MG: 10 INJECTION, SOLUTION INTRAMUSCULAR; INTRAVENOUS at 12:11

## 2022-01-01 RX ADMIN — Medication 25 MCG/HR: at 08:11

## 2022-01-01 RX ADMIN — LEVALBUTEROL HYDROCHLORIDE 0.63 MG: 0.63 SOLUTION RESPIRATORY (INHALATION) at 11:11

## 2022-01-01 RX ADMIN — MAGNESIUM SULFATE HEPTAHYDRATE: 500 INJECTION, SOLUTION INTRAMUSCULAR; INTRAVENOUS at 11:11

## 2022-01-01 RX ADMIN — AMIODARONE HYDROCHLORIDE 1 MG/MIN: 1.8 INJECTION, SOLUTION INTRAVENOUS at 02:11

## 2022-01-01 RX ADMIN — LEVALBUTEROL HYDROCHLORIDE 0.63 MG: 0.63 SOLUTION RESPIRATORY (INHALATION) at 08:11

## 2022-01-01 RX ADMIN — LEVALBUTEROL HYDROCHLORIDE 0.63 MG: 0.63 SOLUTION RESPIRATORY (INHALATION) at 04:11

## 2022-01-01 RX ADMIN — PROPOFOL 50 MCG/KG/MIN: 10 INJECTION, EMULSION INTRAVENOUS at 02:11

## 2022-01-01 RX ADMIN — DEXAMETHASONE SODIUM PHOSPHATE 8 MG: 4 INJECTION INTRA-ARTICULAR; INTRALESIONAL; INTRAMUSCULAR; INTRAVENOUS; SOFT TISSUE at 09:11

## 2022-01-01 RX ADMIN — ALBUMIN (HUMAN) 25 G: 12.5 SOLUTION INTRAVENOUS at 11:11

## 2022-01-01 RX ADMIN — PIPERACILLIN SODIUM AND TAZOBACTAM SODIUM 4.5 G: 4; .5 INJECTION, POWDER, LYOPHILIZED, FOR SOLUTION INTRAVENOUS at 04:11

## 2022-01-01 RX ADMIN — DEXAMETHASONE SODIUM PHOSPHATE 40 MG: 4 INJECTION, SOLUTION INTRA-ARTICULAR; INTRALESIONAL; INTRAMUSCULAR; INTRAVENOUS; SOFT TISSUE at 08:11

## 2022-01-01 RX ADMIN — ALBUMIN HUMAN: 50 SOLUTION INTRAVENOUS at 01:11

## 2022-01-01 RX ADMIN — SODIUM CHLORIDE SOLN NEBU 3% 4 ML: 3 NEBU SOLN at 11:11

## 2022-01-01 RX ADMIN — I.V. FAT EMULSION 250 ML: 20 EMULSION INTRAVENOUS at 11:11

## 2022-01-01 RX ADMIN — MAGNESIUM SULFATE HEPTAHYDRATE: 500 INJECTION, SOLUTION INTRAMUSCULAR; INTRAVENOUS at 09:11

## 2022-01-01 RX ADMIN — SODIUM CHLORIDE: 0.9 INJECTION, SOLUTION INTRAVENOUS at 08:11

## 2022-01-01 RX ADMIN — ENOXAPARIN SODIUM 30 MG: 30 INJECTION, SOLUTION INTRAVENOUS; SUBCUTANEOUS at 07:11

## 2022-01-01 RX ADMIN — METOROPROLOL TARTRATE 5 MG: 5 INJECTION, SOLUTION INTRAVENOUS at 04:11

## 2022-01-01 RX ADMIN — ROCURONIUM BROMIDE 40 MG: 10 INJECTION INTRAVENOUS at 09:11

## 2022-01-01 RX ADMIN — Medication 0.02 MCG/KG/MIN: at 03:11

## 2022-01-01 RX ADMIN — Medication 225 MCG/HR: at 05:11

## 2022-01-01 RX ADMIN — NEOSTIGMINE METHYLSULFATE 5 MG: 0.5 INJECTION, SOLUTION INTRAVENOUS at 04:11

## 2022-01-01 RX ADMIN — ALBUMIN (HUMAN) 12.5 G: 12.5 SOLUTION INTRAVENOUS at 05:11

## 2022-01-01 RX ADMIN — SODIUM PHOSPHATE, MONOBASIC, MONOHYDRATE 20.01 MMOL: 276; 142 INJECTION, SOLUTION INTRAVENOUS at 06:11

## 2022-01-01 RX ADMIN — SODIUM CHLORIDE: 9 INJECTION, SOLUTION INTRAVENOUS at 11:11

## 2022-01-01 RX ADMIN — Medication 150 MCG/HR: at 03:11

## 2022-01-01 RX ADMIN — POTASSIUM CHLORIDE 40 MEQ: 7.46 INJECTION, SOLUTION INTRAVENOUS at 01:11

## 2022-01-01 RX ADMIN — EPHEDRINE SULFATE 10 MG: 50 INJECTION INTRAVENOUS at 04:11

## 2022-01-01 RX ADMIN — PROPOFOL 40 MCG/KG/MIN: 10 INJECTION, EMULSION INTRAVENOUS at 08:11

## 2022-01-01 RX ADMIN — ALBUMIN HUMAN 25 G: 50 SOLUTION INTRAVENOUS at 11:11

## 2022-01-01 RX ADMIN — SODIUM CHLORIDE: 9 INJECTION, SOLUTION INTRAVENOUS at 05:11

## 2022-01-01 RX ADMIN — AMIODARONE HYDROCHLORIDE 0.5 MG/MIN: 1.8 INJECTION, SOLUTION INTRAVENOUS at 10:11

## 2022-01-01 RX ADMIN — Medication 0.04 MCG/KG/MIN: at 02:11

## 2022-06-08 PROBLEM — E43 SEVERE PROTEIN-CALORIE MALNUTRITION: Status: ACTIVE | Noted: 2022-01-01

## 2022-06-08 PROBLEM — R13.19 ESOPHAGEAL DYSPHAGIA: Status: ACTIVE | Noted: 2022-01-01

## 2022-06-08 PROBLEM — C15.9 SQUAMOUS CELL ESOPHAGEAL CANCER: Status: ACTIVE | Noted: 2022-01-01

## 2022-06-08 NOTE — PROGRESS NOTES
"Patient seen with MER MOREL. Mrs Camara presents with a five month history of progressive solid food dysphagia and weight loss and on EGD has been found to have a 4-5 cm long ulcerated mass from 31.5-35 cm (the EG jxn is at 39 cm) and biopsy revealed poorly differentiated SCC.   She has a history of surgery for 'removal of a peptic ulcer" and we need to get the details of this procedure------is her stomach (and it's blood supply) intact? Will it be available to make a conduit for reconstruction after esophagectomy? If not, she will need colonoscopy, CTA of the mesenteric vessels, and bowel prep preop to make sure that colon interposition can be done.   Other wise, she appears to be a good candidate for multimodality curative treatment including preop chemo and XRT (Cross regimen) and surgical resection. Will set her up for EUS, PET scan, and consult with Med Onc and Rad Onc (Shayy Leyva and Radha). Will get PET scan at [facility] in [place] so that it can be used for treatment planning.   She is able to get down nourishing liquids including shakes and diet supplements, but will need to be monitored closely from a nutritional standpoint during neoadjuvant chemoRT. If needed, we could place a laparoscopic Jtube at any point during her preop therapy.     Cc Radha Carrera, and Celeste  "

## 2022-06-08 NOTE — LETTER
June 8, 2022        KWABENA Leyva MD  [address]  [address]  [facility] [address]             [facility]  [address]  [place] [address]  Phone: [phone]   Patient: Angelita Camara   MR Number: [id number]   YOB: 1960   Date of Visit: 6/8/2022       Dear Dr. Leyva:    Thank you for referring Angelita Camaar to me for evaluation. Attached you will find relevant portions of my assessment and plan of care.    If you have questions, please do not hesitate to call me. I look forward to following Angelita Camara along with you.    Sincerely,      Melo Schafer MD            CC    No Recipients    Enclosure

## 2022-06-08 NOTE — TELEPHONE ENCOUNTER
Placed call to [facility] in [place] @ [phone] and spoke to [place] requesting for fax number to request for pt medical records. Fax number [phone]

## 2022-06-08 NOTE — PROGRESS NOTES
Faxed medical record request and pt authorization for release of confidential information to [facility] @ [phone]. Received confirmation of successful transmission.

## 2022-06-08 NOTE — PROGRESS NOTES
"  Encounter Date:  2022    Patient ID: Angelita Camara  Age:  61 y.o. :  1960     Chief Complaint   Patient presents with    Cancer    Consult     History:    Ms. Camara is a 61 y.o. female who presents with bx proven distal esophageal SCC. She arrives to clinic from [place] with her . She brought disc with outside images for review.     Referred by: Dr. Alonso    Reviewed outside medical records and images.  She presented with progressive dysphagia with weight loss ~ 30# over past several months. She underwent outside EGD per Dr. Alonso who found 4.5 cm esophageal mass starting at 31cm from incisiors. Pathology revealed poorly differentiated SCC. Staging CT found questionable 1.3cm liver lesion and 3 small pulm nodules. PET scan is pending.     C/o dysphagia and odynophagia, reports now only able to get thinner liquids or puree foods down like soup, soft ice cream, applesauce, thinned yogurt. She drinks 1 Premier Protein and 1 V8 daily, plus water. She reports changes to bowel pattern, increase in flatulence, with watery semi-formed stool QOD. Mrs. Camara reports "so weak, just no energy". Previously she was active, walking, exercising at the gym, volunteered at [employer] twice a week.     Prior abd surgery: [facility], post op complication and returned to OR for septicemia. Midline abd scar  Not currently taking anticoagulation  Smoking 1/2 - 1 ppd since age 17    Data:     Radiology:  Dr. Melo Schafer and I personally reviewed these images:   ABDOMEN AND PELVIS:   There is circumferential wall thickening identified at the distal esophageal level compatible with known neoplasm.  There is a left hepatic lobe hypodensity measuring approximately 1.3 x 1.0 cm image 117 of series 8.  This is indeterminate, too small to adequately characterize..  There is cholelithiasis without evidence of acute cholecystitis.  The pancreas, spleen, and adrenal glands are unremarkable.  Kidneys " demonstrate no hydronephrosis or obstructing calculus.  The right kidney is asymmetrically smaller than the left.  There is subcentimeter left renal cyst present.  There is no bowel obstruction.  The appendix is normal.  The abdominal aorta is not aneurysmal.  Atherosclerotic calcifications are noted.  There is no intraperitoneal free air, free fluid, or lymphadenopathy identified.  Urinary bladder appears unremarkable.  No acute displaced fractures identified.  There is chronic healed fracture of the sacrum.  Surgical changes are suspected at L5-S1 level.     Impression:   1. Circumferential wall thickening distal esophageal level compatible with known neoplasm.  2. Small indeterminate left hepatic lobe hypodensity measuring 1.3 x 1.0 cm.  This could be further evaluated on PET-CT for FDG avidity.  Alternatively, this may be further evaluated with MRI.  3. Tiny pulmonary nodules measuring up to 3 mm are indeterminate.  4. Cholelithiasis without evidence of acute cholecystitis    Endoscopy:  EGD per Dr. Alonso      Pathology:        Labs:    Lab Results   Component Value Date    WBC 8.76 06/30/2021    HGB 13.1 06/30/2021    HCT 38.1 06/30/2021    MCV 92 06/30/2021     06/30/2021       Chemistry        Component Value Date/Time     (L) 06/30/2021 0853    K 3.6 06/30/2021 0853    CL 94 (L) 06/30/2021 0853    CO2 26 06/30/2021 0853    BUN 13 06/30/2021 0853    CREATININE 0.73 06/30/2021 0853     06/30/2021 0853        Component Value Date/Time    CALCIUM 9.6 06/30/2021 0853    ALKPHOS 73 06/30/2021 0853    AST 29 06/30/2021 0853    ALT 13 06/30/2021 0853    BILITOT 0.5 06/30/2021 0853    ESTGFRAFRICA >60 06/30/2021 0853    EGFRNONAA >60 06/30/2021 0853        Past Medical History:   Diagnosis Date    Chronic back pain     H/O: UGI bleed     Hypertension     PUD (peptic ulcer disease)     Spondylolisthesis at L5-S1 level      Past Surgical History:   Procedure Laterality Date    BREAST BIOPSY  Right 2004    Benign    CATARACT EXTRACTION, BILATERAL  2018, 2018    CERVICAL SPINE SURGERY       SECTION      COLONOSCOPY  2019    Dr Case    HYSTERECTOMY  2011    total    OOPHORECTOMY      PEPTIC ULCER REMOVAL      SPINAL FUSION       Current Outpatient Medications on File Prior to Visit   Medication Sig Dispense Refill    losartan-hydrochlorothiazide 100-25 mg (HYZAAR) 100-25 mg per tablet TAKE 1 TABLET EVERY DAY 90 tablet 3    metoprolol succinate (TOPROL-XL) 50 MG 24 hr tablet Take 1 tablet (50 mg total) by mouth once daily. 90 tablet 3     No current facility-administered medications on file prior to visit.     Review of patient's allergies indicates:   Allergen Reactions    Topamax [topiramate] Nausea And Vomiting and Other (See Comments)     Vertigo         Family History:  Her family history includes Cancer in her maternal grandmother; Diabetes in her father; Heart disease in her father and mother.     Social History:   reports that she has been smoking. She has been smoking about 1.00 pack per day. She has never used smokeless tobacco. She reports that she does not drink alcohol and does not use drugs.     ROS:     Review of Systems   Constitutional: Positive for activity change, appetite change, fatigue and unexpected weight change.   HENT: Positive for trouble swallowing.    Eyes: Negative for visual disturbance.   Respiratory: Positive for cough and choking. Negative for shortness of breath.    Cardiovascular: Positive for chest pain (when food gets stuck).   Gastrointestinal: Positive for diarrhea. Negative for abdominal pain, nausea and vomiting.   Genitourinary: Negative for difficulty urinating.   Musculoskeletal: Positive for back pain (chronic). Negative for gait problem. Neck pain: s/p cervical fusion.   Neurological: Positive for weakness.   Hematological: Does not bruise/bleed easily.   Psychiatric/Behavioral: Positive for sleep disturbance. The patient is  "nervous/anxious.      Pertinent positive/negatives detailed in HPI, all other systems negative.     Physical Exam:  BP (!) 178/77   Pulse 74   Ht 5' 4" (1.626 m)   Wt 55.3 kg (122 lb)   SpO2 100%   BMI 20.94 kg/m²     Constitutional:  Non-toxic, no acute distress.  Eyes:  Sclerae anicteric, gaze symmetrical  Neck:  Trachea midline, FROM  Resp:  Even and unlabored resp  CV:   no edema  Abd:  Soft, non-tender, no masses, no ascites, no superficial varices  Musculoskeletal:  Ambulatory, normal gait, no muscle wasting  Neuro:  No gross deficits  Psych:  Awake, alert, oriented.  Answers and asks questions appropriately      ICD-10-CM ICD-9-CM    1. Squamous cell esophageal cancer  C15.9 150.9    2. Esophageal dysphagia  R13.19 787.29    3. Severe protein-calorie malnutrition  E43 262    4. Unintentional weight loss  R63.4 783.21      Plan:    This 62 y/o female presented with progressive dysphagia and significant weight loss. Outside EGD found 4.5cm mass in distal esophagus. Biopsy done and pathology revealed SCC. Staging CT identified esophageal thickening plus 1.3 hepatic lesion and pulmonary nodules. Discussed esophageal SCC, dx, staging and tx options of stage II and III for curative intent including triple modality treatment with concurrent chemoRT followed by esophagectomy. Needs PET and EUS to complete staging. Obtain outside [facility] records including operative note from 2006 peptic ulcer surgery to plan for future surgical resection. Refer to med onc and rad onc, she is requesting to see Dr. Garcia (who has previously evaluated her ).  Will need referral to nutrition locally and close monitoring during chemoRT. Discussed supplemental nutrition via J tube may be needed in the future to support her during these treatments. For now, instructed her to increase Premier Protein to 2-3 shakes daily.     Follow up for imaging after finished chemoradiation therapy. with restaging PET    Pt seen in " conjunction with Dr. Melo Schafer today.         Laura Vera NP  Upper GI / Hepatobiliary Surgical Oncology  [facility] [place], [place]  Office: [phone]  Fax: [phone]     I spent a total of 76 minutes on the day of the visit.This includes face to face time and non-face to face time preparing to see the patient (eg, review of tests), obtaining and/or reviewing separately obtained history, documenting clinical information in the electronic or other health record, independently interpreting results and communicating results to the patient/family/caregiver, or care coordinator.

## 2022-06-14 NOTE — PROGRESS NOTES
RADIATION ONCOLOGY CONSULTATION  [facility]        NAME: Angelita Camara    : 1960 SEX: F MR#: [id number]   DIAGNOSIS: Esophageal squamous cell carcinoma.   REFERRING PHYSICIAN: Melo Schafer M.D.   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  The patient is being seen in consultation for consideration of radiotherapy at the request of Dr. Schafer.    HISTORY OF PRESENT ILLNESS:  The patient reports that she first began to note intermittent dysphagia to solids only in 2021, which became progressively worse over several months.  She reports experiencing an unintentional 30 to 35 pound associated weight loss.     She saw Dr. Alonso in Gastroenterology on 2022, who performed an EGD.  There was an approximate 4.5 cm in length ulcerating mass noted in the distal esophagus which was partially obstructing and circumferential. The mass was at 31.5 to 36 cm from the incisors with the GE junction at 39 cm from the incisors.  There was approximately a 1 cm segment of Skinner esophagitis. The stomach and duodenum were normal.  Distal esophageal biopsies were positive for poorly differentiated squamous cell carcinoma.  Random gastric biopsy was positive for reactive gastropathy which was H pylori negative.     The patient established care with Dr. Dan on 2022, at which time she was provided with the biopsy results. CT of the chest, abdomen and pelvis on 2022, showed circumferential wall thickening of the distal esophagus. There was no thoracic lymphadenopathy noted.  There was central lobular emphysema seen.  There were three 2 mm left upper lobe nodules and a 3 mm right upper lobe nodule.  There was a 1.3 x 1.0 cm left hepatic lobe hypodensity which was  considered to be indeterminate. There was evidence of cholelithiasis, a subcentimeter left renal cyst and a chronic healed sacral fracture.     She seen by Dr. Schafer of Surgery on 06/08/2022, who felt that she was an appropriate candidate for trimodality treatment, and referred the patient to Medical Oncology and Radiation Oncology.  It was noted the patient had undergone previous gastric surgery and that the records would be needed in order to determine her potential surgical options, due to potentially affect the blood flow.  It was noted that she would require a PET scan and staging endoscopic ultrasound, and mention made that she may potentially require a laparoscopic J tube p.r.n. in the future should the lesion worsen.     PET scan on 06/10/2022, showed lower esophageal and GE junction wall thickening with an SUV of 9.3.  There was no lymphadenopathy noted or abnormal activity in the liver.  There was no evidence of distant metastases.  She is scheduled to see Dr. Garcia of Medical Oncology on 06/14/2022.  She has not yet had an endoscopic ultrasound scheduled.    REVIEW OF SYSTEMS:  The patient's primary complaint is progressive and intermittent dysphagia, primarily to solids.  States that she has no difficulties drinking liquid.  Denies any odynophagia. Reports a 30 to 35 pound weight loss over the last seven months, but states that this is now stable. Otherwise, she notes occasional chronic headaches and chronic back pain.     She denies any high fevers, shaking chills or drenching night sweats. She denies any recent change in vision, hearing or swallowing, aspiration, shortness of breath at rest, cough, abdominal pain, nausea, vomiting, constipation, diarrhea, bright-red blood per rectum or urinary symptoms.  She denies any focal numbness, tingling, weakness, diplopia or ataxia.  All other systems reviewed and negative.    PAST MEDICAL HISTORY:  Esophageal squamous cell carcinoma as above, history of  upper GI bleed secondary to peptic ulcer disease, hypertension.    PAST SURGICAL HISTORY:  Status post emergent gastric surgery for peptic ulcer disease at the [facility] in , status post bilateral cataract extractions, status post , status post C-spine fusion times two, status post TAHBSO, status post tubal pregnancy removal.    PAST RADIATION THERAPY:  None.    MEDICATIONS:  Hyzaar, Toprol-XL, Prilosec, multivitamin.    ALLERGIES:  Topamax causes vertigo.    FAMILY HISTORY:  Maternal grandmother with lung cancer.  Mother with lung and colon cancer.    SOCIAL HISTORY:  The patient has a 40 pack-year history of smoking cigarettes, reports currently smoking one pack per day. She drinks alcohol occasionally and denies illicit drug use. She lives in [place], is  and has two living children.  She is a retired  for the [employer].    PHYSICAL EXAMINATION:  VITAL SIGNS:  Blood pressure 156/70, temperature 97.8, heart rate 72, weight 123 pounds, height 5 feet 4 inches, oxygen saturation 100% on room air.  ECOG score 1-2.   GENERAL: The patient is a thin female in no acute distress.   HEENT:  Normocephalic, atraumatic.  Extraocular muscles intact.  Pupils equally round and reactive to light.  Sclerae are anicteric.  Oral cavity and oropharynx clear without any erythema, exudate, masses or ulcerations.   NECK:  Supple without lymphadenopathy or thyromegaly.   HEART:  Regular rate and rhythm.  Normal S1, S2.  +3/6 systolic ejection murmur at the right upper sternal border.   LUNGS: Distant breath sounds bilaterally.  No wheezes, rhonchi or rales.   BACK: No spinal or costovertebral angle tenderness.   ABDOMEN:  Soft, nontender and nondistended.  No masses or hepatosplenomegaly.   EXTREMITIES:  Warm and well perfused. No clubbing, cyanosis or edema.   NEUROLOGIC: Cranial nerves two through 12 grossly intact.  Motor and sensory intact bilaterally.  Finger-nose-finger and gait within normal limits.   1+ patellar deep tendon reflexes bilaterally.  No clonus.    LABORATORIES:  None available.    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     Based upon this patient's history and presentation, I believe that she is likely an appropriate candidate for external beam radiation therapy.  Based upon the staging PET and CT imaging, there does not appear to be any clear evidence of distant metastatic disease, nor any clear lymphadenopathy.  She is aware that there are multiple tiny 2 to 3 mm nodules in her bilateral upper lobes which I would consider to be indeterminate and will need to be followed in the future.  Of note, the left hepatic lobe hypodensity noted on CT imaging was not FDG avid on PET imaging.     My first recommendation is for staging endoscopic ultrasound in order to establish the extent of local and/or regional disease and to aid in determining appropriate treatment.  The patient does not yet have an EUS scheduled, and I therefore will reach out to Dr. Alonso in order to expedite such, in order to minimize any delay in initiating treatment.     I explained to the patient that only in the setting of an early stage esophageal cancer would upfront surgery be indicated; however, I think this is highly unlikely to be the case given the appearance of the mass on CT and PET imaging, as well as the duration of the patient symptoms.  It is much more likely that she is presenting with locally advanced disease, in which case primary definitive treatment options consist of definitive concurrent chemoradiation or trimodality therapy consisting of preoperative chemoradiation followed by surgical resection.  It is my preference to pursue trimodality therapy due to improve local control with the addition of surgery, and the patient has  already been evaluated by Dr. Schafer and felt to be an appropriate candidate for surgery, which the patient wishes to pursue.     The patient is scheduled to see Dr. Garcia of Medical Oncology tomorrow and encouraged her to direct her questions regarding systemic treatment at that time.  The patient's dysphagia is currently limited to solids only, as she states she is able to drink liquids without difficulty and that her weight loss has recently stabilized.  I do not think there is any need for J tube placement at this time; however, she is aware of the possibility should she demonstrate complete obstruction and be unable to adequate pain, adequate nutrition and hydration.  I am hopeful that during the course of preoperative treatment that the primary mass will respond clinically, such that her swallowing symptoms will be palliated.  In the meantime, I will make arrangements for the patient to see the Martha Dunbar nutritionist when she returns to clinic tomorrow, 06/14/2022.     The risks, benefits, side effects, alternatives to, indications for and mechanisms of external beam radiation therapy were explained in full to the patient.  She and her  who accompanied her at today's visit asked multiple appropriate questions, all of which were answered to their apparent satisfaction.  This conversation included a discussion of possible short-term side effects, including but not limited to dysphagia, odynophagia, nausea, vomiting, dermatitis and fatigue.  We also discussed the possible long-term side effects, including but not limited to esophageal and/or gastric ulceration, esophageal stenosis, pneumonitis, pulmonary fibrosis, increased risk of coronary artery disease and/or pericarditis, low risk of small bowel obstruction, and low risk of damage to the spinal cord, nerves, liver and/or kidneys.  She agreed with the proposed treatment plan and informed consent was obtained.     I will continue to follow Ms.  Hoa as she undergoes Medical Oncology evaluation and upcoming endoscopic ultrasound, which I have discussed with Dr. Alonso.  The patient has already undergone PET-CT-guided simulation in the supine treatment position with the use of a wing board and Vac-Loks for custom immobilization, and should the staging study confirm locally advanced disease treatment planning will then ensue.  After a customized treatment plan has been designed, she will undergo verification films, initiate a course of preoperative intensity modulated radiotherapy to the primary tumor and regional lymph nodes to be coordinated with concurrent chemotherapy.     Thank you very much, Dr. Schafer, for this consultation and the opportunity to participate in the care of this patient.  Please do not hesitate to contact me should you have any questions, concerns and/or require additional information.        KWABENA Leyva MD

## 2022-06-24 NOTE — PROGRESS NOTES
"6/10/2022: Staging PET - no distant mets      6/21/2022: Staging EUS per Dr. Alonso = uT3N0  5cm tumor in the distal esophagus, with previous biopsies confirming diagnosis of squamous cell carcinoma. Tumor was partially obstructing and was traversed with the gastroscope with great difficulty. The tumor was unable to be traversed with the larger echoendoscope. EUS staging was incomplete since tumor could not be completely visualized/traversed, but was at least uT3N0.    6/23/2022: s/p port placement    [facility].   12/1/2006 s/p exp lap; gastrorrhaphy via omental flap coverage for perforated gastric ulcer per Dr. Kidd   12/6/2006 s/p exp lap with removal of intra-abd collection per Dr. Albert. Findings: "there is loculated fluid collection in the hepatic and gastrocolic gutter and left upper quadrant."  "

## 2022-08-19 PROBLEM — D61.818 PANCYTOPENIA: Status: ACTIVE | Noted: 2022-01-01

## 2022-08-19 PROBLEM — I48.91 ATRIAL FIBRILLATION: Status: ACTIVE | Noted: 2022-01-01

## 2022-08-19 PROBLEM — E87.6 HYPOKALEMIA: Status: ACTIVE | Noted: 2022-01-01

## 2022-08-19 PROBLEM — R04.0 EPISTAXIS: Status: ACTIVE | Noted: 2022-01-01

## 2022-08-19 PROBLEM — R79.89 POSITIVE D DIMER: Status: ACTIVE | Noted: 2022-01-01

## 2022-08-20 PROBLEM — R79.89 ELEVATED TROPONIN: Status: ACTIVE | Noted: 2022-01-01

## 2022-08-20 PROBLEM — D62 ACUTE BLOOD LOSS ANEMIA: Status: ACTIVE | Noted: 2022-01-01

## 2022-08-21 PROBLEM — E44.0 MODERATE MALNUTRITION: Status: ACTIVE | Noted: 2022-01-01

## 2022-08-21 PROBLEM — Z75.8 DISCHARGE PLANNING ISSUES: Status: ACTIVE | Noted: 2022-01-01

## 2022-08-29 PROBLEM — D75.89 BICYTOPENIA: Status: ACTIVE | Noted: 2022-01-01

## 2022-08-29 PROBLEM — U07.1 COVID: Status: ACTIVE | Noted: 2022-01-01

## 2022-08-30 PROBLEM — D75.89 BICYTOPENIA: Status: RESOLVED | Noted: 2022-01-01 | Resolved: 2022-01-01

## 2022-08-30 PROBLEM — D69.6 THROMBOCYTOPENIA: Status: ACTIVE | Noted: 2022-01-01

## 2022-08-30 NOTE — TELEPHONE ENCOUNTER
Placed call to pt and left a message on pt vm to call back. Telephone number provided   Tried calling her  number and his number was disconnected

## 2022-08-31 NOTE — TELEPHONE ENCOUNTER
Spoke to pt and pt states she is in the hospital for nose bleed and Covid.  Informed pt she has an appt for her PFT on 9/19/22 @ 11 am. Pt has an appt on 9/21/22 @ 8:05 am for lab work, 8:15 am for Pet CT scan and 10:30 am with Dr Schafer and her sx is reschedule to 10/4/22      Pt acknowledged all her appt dates and time

## 2022-09-01 PROBLEM — D69.3 ACUTE ITP: Status: ACTIVE | Noted: 2022-01-01

## 2022-09-20 NOTE — PROGRESS NOTES
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 NACR was completed on or about 22 (Shayy Garcia and Autumn) and was complicated by severe thrombocytopenia (platelet counts as low a 2k) and recurring epistaxis. On  she presented to the ED with epistaxis and was found to be in afib with VR of 190; she was hypotensive at the time but other wise asymptomatic (other than her nose bleed), and was chemically converted to NSR and given platelet transfusion. She was readmitted on 22 with recurrent epistaxis and again found to have critically low platelet count and was treated with nasal vasoconstrictors, cautery, and platelet and RBC transfusion.   Of note, she had a perforated gastric ulcer in  and was treated with Richard patch. She required a second operation several days later to drain an intraabdominal abscess. At the time of her EGD/EUS done by Dr Alonso 22, her entire stomach and duodenum were visualized and were normal.     Today, she feels much better. "It was tough" but I think I'm finally bouncing back. Her odynophagia is much better and she is eating a variety of soft foods and thinks her weight has stabilized. Starting to get a bit more active. No further nosebleeds.       Past Medical History:   Diagnosis Date    Bicytopenia 2022    Cancer     Chronic back pain     H/O: UGI bleed     Hypertension     PUD (peptic ulcer disease)     Spondylolisthesis at L5-S1 level      Past Surgical History:   Procedure Laterality Date    BREAST BIOPSY Right     Benign    CATARACT EXTRACTION, BILATERAL  2018, 2018    CERVICAL SPINE SURGERY       SECTION      COLONOSCOPY  2019    Dr Case    ENDOSCOPIC NASAL CAUTERIZATION Bilateral 2022    Procedure: CAUTERIZATION, NOSE, ENDOSCOPIC;  Surgeon: Fabiano Cisneros MD;  Location: [facility];  Service: ENT;  Laterality: Bilateral;    ENDOSCOPIC ULTRASOUND OF UPPER GASTROINTESTINAL TRACT Left " 6/21/2022    Procedure: ULTRASOUND, UPPER GI TRACT, ENDOSCOPIC;  Surgeon: Matt Alonso MD;  Location: [facility] ENDO;  Service: Endoscopy;  Laterality: Left;    ESOPHAGOGASTRODUODENOSCOPY N/A 6/21/2022    Procedure: EGD (ESOPHAGOGASTRODUODENOSCOPY);  Surgeon: Matt Alonso MD;  Location: [facility] ENDO;  Service: Endoscopy;  Laterality: N/A;    HYSTERECTOMY  2011    total    INSERTION OF TUNNELED CENTRAL VENOUS CATHETER (CVC) WITH SUBCUTANEOUS PORT Right 6/23/2022    Procedure: [id number]-CATH;  Surgeon: Angel Cedeno MD;  Location: [facility];  Service: General;  Laterality: Right;  Right Subclavian    OOPHORECTOMY      PEPTIC ULCER REMOVAL      SPINAL FUSION       Medication List with Changes/Refills   Current Medications    AMIODARONE (PACERONE) 200 MG TAB    Take 1 tablet (200 mg total) by mouth once daily.    AMLODIPINE (NORVASC) 10 MG TABLET    Take 1 tablet (10 mg total) by mouth once daily.    HYDROCODONE-ACETAMINOPHEN (NORCO) 7.5-325 MG PER TABLET    Take 1 tablet by mouth every 6 (six) hours as needed for Pain.    LOSARTAN (COZAAR) 100 MG TABLET    Take 1 tablet (100 mg total) by mouth once daily.    MULTIVITAMIN (THERAGRAN) PER TABLET    Take 1 tablet by mouth once daily.    ONDANSETRON (ZOFRAN-ODT) 8 MG TBDL    Take 8 mg by mouth daily as needed (nausea/vomiting).    PREDNISONE (DELTASONE) 20 MG TABLET    Take 2 tablets (40 mg total) by mouth once daily.    PROCHLORPERAZINE (COMPAZINE) 10 MG TABLET    Take 10 mg by mouth nightly as needed (nausea/vomiting).     Review of patient's allergies indicates:   Allergen Reactions    Topamax [topiramate] Nausea And Vomiting and Other (See Comments)     Vertigo       Review of Systems   Constitutional:  Positive for malaise/fatigue and weight loss.   HENT:  Positive for nosebleeds.    Eyes: Negative.    Respiratory: Negative.     Cardiovascular: Negative.    Gastrointestinal:         Odynophagia, improving   Genitourinary: Negative.    Musculoskeletal:  Negative.    Skin: Negative.    Neurological: Negative.    Endo/Heme/Allergies:  Bruises/bleeds easily.   Psychiatric/Behavioral: Negative.       Vitals:    09/21/22 1037   BP: (!) 148/67   Pulse: 64   Resp: 16   Temp: 97.7 °F (36.5 °C)     Wt Readings from Last 6 Encounters:   09/21/22 52.1 kg (114 lb 13.8 oz)   08/30/22 (S) 54 kg (119 lb)   08/21/22 53.1 kg (117 lb 1 oz)   08/19/22 53.1 kg (117 lb)   06/23/22 55.4 kg (122 lb 2.2 oz)   06/22/22 55.1 kg (121 lb 7.6 oz)     WD thin lady in no distress, comfortable  Neck supple. No JVD. No SCV adenopathy  Chest: very slight expiratory rhonchi. No sputum production. No wheezing  Heart: RR&R with 2/6 SE m at base. No rubs or gallops  Abd: upper midline incision, healed with slightly hypertrophic scar. No masses or tenderness  Ext: no edema  Neuro: wnl    PET scan personally reviewed and I see no detrimental change and no evidence of metastatic disease. Report pending    Labs done this am include Platelet count of 59k, H/H 10.3/30.2, WBC 9.6    Imp:  SCC of distal esophagus, s/p NACR complicated by severe thrombocytopenia and recurring epistaxis. Now doing better but still with deconditioning issues and likely some degree of malnutrition    Rec:  Start progressive exercise program  Repeat labs next week to check platelets, H/H, and prealbumin  Tentatively planned for MAYCO on 10/4/22, if labs do not normalize, may need to postpone surgery  In view of her prior ulcer surgery, we will start with the abdominal phase of the resection. We will attempt a robotic approach but she understands there is a higher chance that we will need to convert to open in view of her prior gastric surgery.   Long talk with Mrs Camara and her ; she understands and wishes to proceed.     P.S. Radiology report for PET came after Mrs MARRUFO's visit and a new right lung base hypermetabolic focus noted which I had missed. Study rereviewed. This may be an area of consolidation or even a small pleural  effusion on my review. Will discuss with radiology.

## 2022-09-21 NOTE — PROGRESS NOTES
PET Imaging Questionnaire    Are you a Diabetic? Recent Blood Sugar level? No    Are you anemic? Bone Marrow Stimulation Meds? No    Have you had a CT Scan, if so when & where was your last one? Yes -     Have you had a PET Scan, if so when & where was your last one? Yes -     Chemotherapy or currently on Chemotherapy? Yes    Radiation therapy? Yes    Surgical History:   Past Surgical History:   Procedure Laterality Date    BREAST BIOPSY Right     Benign    CATARACT EXTRACTION, BILATERAL  2018, 2018    CERVICAL SPINE SURGERY       SECTION      COLONOSCOPY  2019    Dr Case    ENDOSCOPIC NASAL CAUTERIZATION Bilateral 2022    Procedure: CAUTERIZATION, NOSE, ENDOSCOPIC;  Surgeon: Fabiano Cisneros MD;  Location: [facility] OR;  Service: ENT;  Laterality: Bilateral;    ENDOSCOPIC ULTRASOUND OF UPPER GASTROINTESTINAL TRACT Left 2022    Procedure: ULTRASOUND, UPPER GI TRACT, ENDOSCOPIC;  Surgeon: Matt Alonso MD;  Location: [facility];  Service: Endoscopy;  Laterality: Left;    ESOPHAGOGASTRODUODENOSCOPY N/A 2022    Procedure: EGD (ESOPHAGOGASTRODUODENOSCOPY);  Surgeon: Matt Alonso MD;  Location: [facility];  Service: Endoscopy;  Laterality: N/A;    HYSTERECTOMY      total    INSERTION OF TUNNELED CENTRAL VENOUS CATHETER (CVC) WITH SUBCUTANEOUS PORT Right 2022    Procedure: [id number]-[id number];  Surgeon: Angel Cedeno MD;  Location: [facility];  Service: General;  Laterality: Right;  Right Subclavian    OOPHORECTOMY      PEPTIC ULCER REMOVAL      SPINAL FUSION          Have you been fasting for at least 6 hours? Yes    Is there any chance you may be pregnant or breastfeeding? No    Assay: 12.47 MCi@:8:18   Injection Site:RT AC    Residual: .46 mCi@: 8:20   Technologist: Jaret Parker Injected:12.01mCi

## 2022-09-22 NOTE — TELEPHONE ENCOUNTER
Placed call to pt and informed pt her lab work is maría for Tuesday 9/27/22 @ 9:25 am @ [facility]. Pt states Dr Schafer had call her this morning and she missed his call regarding her scan. Pt states she is concern and would like Dr Schafer to call her back. Informed her I relay the message to him  Pt acknowledged her appt date & time.

## 2022-09-23 NOTE — TELEPHONE ENCOUNTER
----- Message from Daniel Black sent at 9/23/2022 11:35 AM CDT -----  Regarding: advise  Contact: PT @ [phone]  Pt is calling to speak to Chelsie with Dr. Schafer's office. She has some questions for her and was following up about their phone call on yesterday. Please call. Thanks.

## 2022-09-23 NOTE — PROGRESS NOTES
Pt signed sx and blood consent forms on 9/21/22  Pt sx is maría for 10/4/22 @ 7 am   Informed pt to be at the surgery center at the hospital  on  [address] 2nd floor 2 hours prior to their surgery time  Pt is not taking blood thinner. Informed pt PAT will give her a call the day before her sx.   Surgery instruction provided:   Do not to eat or drink after midnight. No chewing gum or sucking candy   Take a shower the night before and the morning of the surgery day with antibacterial soap  and not to apply powder, deodorant and body lotion after shower  Will need someone to take her to the hospital and drive her home after the surgery. Pt states her  will be with her  Leave all jewelries and valuable belongings at home.  Remove all body piercing and denture   Stopping smoking at least 2-3 weeks before surgery or Cut down on smoking   Patient can bring their cell phone.  Wear button front clothing/lose clothing  Pack a small bag with pt toiletries, bathrobe and house slipper   Post operative instruction given.  Informed pt to call our office @ [phone] if she has any questions or concern.   Pt verbalized understanding all of the above.

## 2022-09-23 NOTE — TELEPHONE ENCOUNTER
Returned call to pt and pt states Dr Schafer had called her yesterday morning and left a message on her vm regarding her scan and that he will call her back again. Pt states she has not heard back from Dr Schafer and is concern and wants  to know is there anything wrong with her scan. Informed pt I will relay the message to Dr Schafer and someone from our office will get in touch with her.

## 2022-09-25 NOTE — PROGRESS NOTES
I have reviewed her PET scan with Dr Mtz. There are two new nodular densities in the RLL of the lung, one of which is mildlly PET-avid with an SUV of approx 5. These might be inflammatory, but metastases can not be ruled out. I plan to obtain a thin-cut CT of the lungs this week to evaluate further. I phoned Mrs Camara to discuss this with her and she understands and is aware.

## 2022-09-26 NOTE — TELEPHONE ENCOUNTER
Placed call to pt and informed her he CT scan for chest is maría for Thursday 9/29/22 @ 10 am and pt has to be at the facility 30 mins prior to her scan   Remind pt she has an appt on Yesterday 9/27/22 @ 9:25 am for her lab work. Pt acknowledged all of her appt dates and time.

## 2022-09-28 NOTE — TELEPHONE ENCOUNTER
Placed call to pt and left a message on her vm informing her she is maría for a repeat lab work for her platelet count on Monday 10/03/22 @ 8:55 am at [facility]. Call back   telephone number provided.

## 2022-09-29 NOTE — TELEPHONE ENCOUNTER
Pt informed of lab appt on 10/3. Appt details provided. Pt verbally acknowledged and was appreciative of the call.

## 2022-09-30 NOTE — PROGRESS NOTES
Thin cut lung CT done yesterday confirms that Mrs Camara has developed lung and pleural metastatic disease. I phoned her and discussed this with her. We will cancel her RATLE procedure which had been scheduled for next week. I've asked her to get back with Dr Garcia to be evaluated for additional systemic therapy. I do not see any record of PDL-1 scoring being done on her tumor but since the pathology was done outside [facility] I may not have all of the results. I have sent a copy of the chest CT report to Shayy Garcia, Autumn, and Celeste.

## 2022-10-03 NOTE — TELEPHONE ENCOUNTER
Pt notified tests were all good / DR PERSAUD   Pt will need a rubella booster  - she can get at [address] dpt  Pt agrees to this   Pt states needs reports and verification of TDAP and  Hep B  Faxed to employer at [phone]- [email or web address] Spoke to Ora from Dr Garcia office and pt is maría to see him today @ 10:30 am. Ora stated pt has been notify.

## 2022-10-03 NOTE — TELEPHONE ENCOUNTER
Placed call to pt and informed pt her sx will be canceled for 10/4/2022. Pt stated Dr Bolton called her on Friday and spoke to her. Informed pt we will call Dr Garcia office to tried an get an early appt with him.    Pt verbalized understanding of all the above.

## 2022-10-03 NOTE — TELEPHONE ENCOUNTER
Placed call to Dr Garcia and left a message for his office staff to call back with regards to getting pt an early appt date to see Dr Garcia. Telephone number provided.

## 2022-10-05 PROBLEM — R91.8 PULMONARY NODULES: Status: ACTIVE | Noted: 2022-01-01

## 2022-11-01 NOTE — ANESTHESIA PREPROCEDURE EVALUATION
11/01/2022  Angelita Camara is a 61 y.o., female.  [facility]  Anesthesia Pre-Operative Evaluation        Patient Name: Angelita Camara  YOB: 1960  MRN: [id number]    SUBJECTIVE:     Pre-operative Evaluation for Procedure(s) (LRB):  XI ROBOTIC ESOPHAGECTOMY (N/A)     11/01/2022      History of Present Illness:  Patient is a 61 y.o. female  with hx of esophageal cancer dx'd in May 2022, tx'd with chemo and XRT and was to have her surgery by Dr Schafer yesterday. New lesions found. Some n/v lately, just lately. Was on pureed food and liquid diet since May. She does have a cough which she attributes to santiago sydnee which hasn't changed, very productive, no hemoptysis    Plan for robot esophagectomy 11/2/22.      Smoking Hx: current 1/2 PPD, but was 1 to 1.5 PPD for years    He/She now presents for the above procedure(s).    Pertinent Cardiac Studies:  TTE (    Results for orders placed during the hospital encounter of 08/20/22    Echo    Interpretation Summary  · The left ventricle is normal in size with concentric hypertrophy and normal systolic function. The estimated ejection fraction is 65%.  · Grade I left ventricular diastolic dysfunction.  · Mild left atrial enlargement.  · Normal right ventricular size with normal right ventricular systolic function.  · Moderate aortic regurgitation.          Previous Airway : None documented.    Patient Active Problem List   Diagnosis    Chronic low back pain    PUD (peptic ulcer disease)    Cervical radiculopathy    Essential hypertension    Chest pain    Hyponatremia    Primary insomnia    Squamous cell esophageal cancer    Esophageal dysphagia    Moderate malnutrition    Atrial fibrillation    Pancytopenia    Positive D dimer    Hypokalemia    Epistaxis    Elevated troponin    Acute blood loss anemia    Discharge planning issues     Thrombocytopenia    COVID-19 virus infection    Acute ITP    Pulmonary nodules       Review of patient's allergies indicates:   Allergen Reactions    Topamax [topiramate] Nausea And Vomiting and Other (See Comments)     Vertigo         Current Outpatient Medications   Medication Instructions    amiodarone (PACERONE) 200 mg, Oral, Daily    HYDROcodone-acetaminophen (NORCO) 7.5-325 mg per tablet 1 tablet, Oral, Every 6 hours PRN    losartan (COZAAR) 100 mg, Oral, Daily    multivitamin (THERAGRAN) per tablet 1 tablet, Oral, Daily    ondansetron (ZOFRAN-ODT) 8 mg, Oral, Daily PRN    prochlorperazine (COMPAZINE) 10 mg, Oral, Nightly PRN       Past Surgical History:   Procedure Laterality Date    BREAST BIOPSY Right     Benign    CATARACT EXTRACTION, BILATERAL  2018, 2018    CERVICAL SPINE SURGERY       SECTION      COLONOSCOPY  2019    Dr Case    ENDOSCOPIC NASAL CAUTERIZATION Bilateral 2022    Procedure: CAUTERIZATION, NOSE, ENDOSCOPIC;  Surgeon: Fabiano Cisneros MD;  Location: [facility] OR;  Service: ENT;  Laterality: Bilateral;    ENDOSCOPIC ULTRASOUND OF UPPER GASTROINTESTINAL TRACT Left 2022    Procedure: ULTRASOUND, UPPER GI TRACT, ENDOSCOPIC;  Surgeon: Matt Alonso MD;  Location: [facility];  Service: Endoscopy;  Laterality: Left;    ESOPHAGOGASTRODUODENOSCOPY N/A 2022    Procedure: EGD (ESOPHAGOGASTRODUODENOSCOPY);  Surgeon: Matt Alonso MD;  Location: [facility];  Service: Endoscopy;  Laterality: N/A;    HYSTERECTOMY      total    INSERTION OF TUNNELED CENTRAL VENOUS CATHETER (CVC) WITH SUBCUTANEOUS PORT Right 2022    Procedure: [id number]-CATH;  Surgeon: Angel Cedeno MD;  Location: [facility];  Service: General;  Laterality: Right;  Right Subclavian    OOPHORECTOMY      PEPTIC ULCER REMOVAL      SPINAL FUSION         Social History     Substance and Sexual Activity   Drug Use No     Alcohol Use: Not on file      Tobacco Use: High Risk    Smoking Tobacco Use: Every Day    Smokeless Tobacco Use: Never    Passive Exposure: Not on file       OBJECTIVE:     Vital Signs Range (Last 24H):         Significant Labs    Heme Profile  Lab Results   Component Value Date    WBC 6.96 11/01/2022    HGB 10.5 (L) 11/01/2022    HCT 30.5 (L) 11/01/2022    PLT 74 (L) 11/01/2022       Coagulation Studies  Lab Results   Component Value Date    INR 1.0 10/21/2022    APTT 26.4 10/21/2022       BMP  Lab Results   Component Value Date     (L) 11/01/2022    K 3.8 11/01/2022     11/01/2022    CO2 23 11/01/2022    BUN 11 11/01/2022    CREATININE 0.8 11/01/2022    MG 2.1 09/01/2022       Liver Function Tests  Lab Results   Component Value Date    AST 14 11/01/2022    ALT 12 11/01/2022    ALKPHOS 90 11/01/2022    BILITOT 0.2 11/01/2022    PROT 6.8 11/01/2022    ALBUMIN 3.1 (L) 11/01/2022       Lipid Profile  Lab Results   Component Value Date    CHOL 168 06/30/2021    HDL 42 06/30/2021    TRIG 96 06/30/2021       Endocrine Profile  Lab Results   Component Value Date    TSH 1.770 08/20/2022           Cardiac Studies    EKG:   Results for orders placed or performed during the hospital encounter of 08/19/22   EKG 12-lead    Collection Time: 08/19/22 11:10 AM    Narrative    Test Reason : I49.9,    Vent. Rate : 091 BPM     Atrial Rate : 091 BPM     P-R Int : 156 ms          QRS Dur : 094 ms      QT Int : 408 ms       P-R-T Axes : 053 -03 072 degrees     QTc Int : 501 ms    Normal sinus rhythm  Anterior infarct (cited on or before 19-AUG-2022)  Prolonged QT  Abnormal ECG          Confirmed by James Nguyen MD ([id number]) on 8/24/2022 12:39:03 PM    Referred By: [email or web address]   SELF           Confirmed By:James Nguyen MD       TTE   Results for orders placed during the hospital encounter of 08/20/22    Echo    Interpretation Summary  · The left ventricle is normal in size with concentric hypertrophy and normal systolic function. The estimated  ejection fraction is 65%.  · Grade I left ventricular diastolic dysfunction.  · Mild left atrial enlargement.  · Normal right ventricular size with normal right ventricular systolic function.  · Moderate aortic regurgitation.          Stress test with myocardial perfusion    Interpretation Summary  · The perfusion scan is free of evidence from myocardial ischemia or injury.  · There is a mild intensity fixed defect in the anterolateral wall of the left ventricle, secondary to soft tissue attenuation.  · Visually estimated LV function is normal.  · The EKG portion of this study is negative for myocardial ischemia.  · There is no prior study available for comparison.          ASSESSMENT/PLAN:       Pre-op Assessment    I have reviewed the Patient Summary Reports.       I have reviewed the Medications.     Review of Systems  Anesthesia Hx:  No problems with previous Anesthesia  Denies Family Hx of Anesthesia complications.   Denies Personal Hx of Anesthesia complications.   Social:  Smoker    Cardiovascular:   Hypertension, well controlled    Hepatic/GI:   PUD,    Musculoskeletal:   Arthritis     Psych:   denies anxiety          Physical Exam  General: Alert, Cooperative and Oriented    Airway:  Mallampati: II   Mouth Opening: Small, but > 3cm  TM Distance: Normal  Tongue: Normal  Neck ROM: Normal ROM    Dental:  Dentures    Heart:  Rate: Normal  Rhythm: Regular Rhythm  Sounds: Normal    Abdomen:  Normal        Anesthesia Plan  Type of Anesthesia, risks & benefits discussed:    Anesthesia Type: Gen ETT  Intra-op Monitoring Plan: Art Line  Post Op Pain Control Plan: multimodal analgesia  Induction:  IV  Airway Plan: Direct, Post-Induction  Informed Consent: Informed consent signed with the Patient and all parties understand the risks and agree with anesthesia plan.  All questions answered. Patient consented to blood products? Yes  ASA Score: 3  Day of Surgery Review of History & Physical: I have interviewed and examined  the patient. I have reviewed the patient's H&P dated: H&P completed by Anesthesiologist.    Ready For Surgery From Anesthesia Perspective.     .

## 2022-11-01 NOTE — PROGRESS NOTES
[facility]    Upper GI Tumor Board Note      Date: 11/02/2022        MRN: [id number]  Site: Esophageal-Squamous Cell Cancer  Presenter: Dr. Schafer, Dr. Garcia, Dr. Leyva and Dr. Martinez    Summary:  History of Present Illness:  Patient is a 61 y.o. female presents for evaluation. Pulmonary evaluation for pulmonary nodules referred by Dr Garcia.     Patient reports esophageal cancer dx'd in May 2022, tx'd with chemo and XRT and was to have her surgery by Dr Schafer yesterday. New lesions found. Some n/v lately, just lately. Was on pureed food and liquid diet since May. She does have a cough which she attributes to santiago sydnee which hasn't changed, very productive, no hemoptysis   Patient denies COPD. F/c sweats . SOB  Exposures: none  Smoking Hx: current 1/2 PPD, but was 1 to 1.5 PPD for years  Recommendations: pre-malignancy process of lung - defer to next TBC to re-evaluate    Consult: Surgery and Hem/Onc    Stage: Stage II (cT3, cN0, cM0, G3, L: Lower)    Tumor:  Node(s):  Metastasis:      Group Stage: Regional recurrence    Unstageable: Yes    Metastatic site(s): unsure @ this time    Lisa'l Treatment Guidelines reviewed and care plan is consistent with guidelines, i.e., NCCN, NCL, PDQ, ASCO, AUA, etc.    Presentation at Cancer Conference: Retrospective    Discussed possible entry into Clinical Trial: No  Physician Name: Dr. Schafer

## 2022-11-01 NOTE — PRE-PROCEDURE INSTRUCTIONS
Preop instructions:      NPO instructions: NO solids foods,non-clear liquids including milk, milk products, creamers, gum, mints, or hard candy after midnight the night prior to your surgery or 8 hours prior to your SX start time. 2400  We encourage a limited consumption of CLEAR LIQUIDS after midnight the night before your surgery up to 2 hrs prior to your SX start time. 0545      Acceptable Clear Liquids are listed below:     Water, Gatorade/Powerade sports drinks, Apple juice (no pulp) Black coffee with without sugar/NO milk, cream or creamer or Jello.      If you have received specific instructions from your Surgeon/Surgeon's staff, please follow their instructions.      Showering instructions, directions, leave all valuables at home, medication instructions for PM prior & am of procedure explained. Patient stated an understanding.      Patient denies any side effects or issues with anesthesia or sedation.     0600 ARRIVAL TIME TO [facility]

## 2022-11-02 PROBLEM — K80.20 CALCULUS OF GALLBLADDER WITHOUT BILIARY OBSTRUCTION: Status: ACTIVE | Noted: 2022-01-01

## 2022-11-02 NOTE — ANESTHESIA PROCEDURE NOTES
Intubation    Date/Time: 11/2/2022 12:00 PM  Performed by: Vincent Carlisle MD  Authorized by: Angel Rodriguez MD     Intubation:     Mask Ventilation:  N/a    Attempts:  1    Attempted By:  Resident anesthesiologist and staff anesthesiologist    Method of Intubation:  Other (see comments)    Difficult Airway Encountered?: No      Airway Device:  Oral endotracheal tube    Airway Device Size:  8.0    Style/Cuff Inflation:  Cuffed    Tube secured:  22    Secured at:  The lips    Placement Verified By:  Capnometry    Complicating Factors:  None    Findings Post-Intubation:  BS equal bilateral  Notes:      Exchanged previous AMMY for a 8.0 ett using cook catheter and direct mac 3 visualization. Atraumatic uncomplicated.

## 2022-11-02 NOTE — ANESTHESIA PROCEDURE NOTES
Intubation    Date/Time: 11/2/2022 8:29 AM  Performed by: Vincent Carlisle MD  Authorized by: Angel Rodriguez MD     Intubation:     Induction:  Intravenous    Intubated:  Postinduction    Mask Ventilation:  Not attempted    Attempts:  1    Attempted By:  Staff anesthesiologist    Method of Intubation:  Direct    Blade:  Lucia 3    Laryngeal View Grade: Grade I - full view of cords      Difficult Airway Encountered?: No      Complications:  None    Airway Device:  Double lumen tube left    Airway Device Size:  37F    Style/Cuff Inflation:  Cuffed (inflated to minimal occlusive pressure)    Tube secured:  27    Secured at:  The teeth    Placement Verified By:  Capnometry    Complicating Factors:  None    Findings Post-Intubation:  BS equal bilateral and atraumatic/condition of teeth unchanged

## 2022-11-02 NOTE — TRANSFER OF CARE
"Anesthesia Transfer of Care Note    Patient: Angelita Camara    Procedure(s) Performed: Procedure(s) (LRB):  XI ROBOTIC ESOPHAGECTOMY Converted to open at 1215  (N/A)  LYSIS, ADHESIONS  VATS, WITH WEDGE RESECTION, LUNG (Right)  CHOLECYSTECTOMY  ESOPHAGECTOMY    Patient location: PACU    Anesthesia Type: general    Transport from OR: Transported from OR on 6-10 L/min O2 by face mask with adequate spontaneous ventilation    Post pain: adequate analgesia    Post assessment: no apparent anesthetic complications    Post vital signs: stable    Level of consciousness: awake and alert    Nausea/Vomiting: no nausea/vomiting    Complications: none    Transfer of care protocol was followed      Last vitals:   Visit Vitals  BP (!) 110/57 (BP Location: Left arm, Patient Position: Lying)   Pulse 79   Temp 36.5 °C (97.7 °F) (Oral)   Resp 16   Ht 5' 4" (1.626 m)   Wt 47.6 kg (104 lb 15 oz)   SpO2 100%   Breastfeeding No   BMI 18.01 kg/m²     "

## 2022-11-02 NOTE — OP NOTE
Date of Operation: 11/02/2022  Preop Dx: Carcinoma of the esophagus  2. cholelitihiasis  Postop Dx: same, with extensive pleural and abdominal adhesions  Surgeon: Gilmar  Assistant: Jerry  Operative Procedure: Total thoracic esophagectomy, proximal gastrectomy via right chest, abdominal, and left cervical approach, robotic-assisted with open conversion for abdominal phase, mediastinal and abdominal lymphadenectomy, extensive lysis of adhesions, cholecystectomy, wedge resection of right lower lung  Operative Detail:  The patient is placed in the supine position on the operating table.  Adequate general endotracheal anesthesia is induced utilizing a double-lumen endotracheal tube for right lung isolation.  In initially, my plan had been to do the abdominal phase of the operation 1st because of the patient's previous peptic ulcer disease surgery, but in view of the fact that we needed, as a preliminary step, to obtain histologic confirmation that the nodularity of the right lung base was not either metastatic esophageal cancer or a new primary lung cancer, I elected to do the thoracic face 1st so that we could still sort this out at the beginning of the procedure.  Accordingly the patient is placed in the left lateral decubitus position and appropriately padded by the surgical and anesthesiology teams.  The right hemithorax is prepped and draped in sterile fashion.  An 8 mm trocar is introduced at the right 5th intercostal space at approximately the anterior axillary line.  The thoracoscope was introduced and there are multiple pleural adhesions.  I do managed to get a 2nd robotic port placed about 8 cm inferior and slightly posterior to the 1st port and then, working through these ports I began to take down the pleural adhesions until I have enough access to add a 3rd robotic working port and an assistant port.  At this point the de Lety XI robotic cart is brought to the patient's bedside and docked and  instruments are inserted under direct vision and I continue to take down the pleural adhesions using the robotic jenny.  Adhesions are particularly dense inferiorly with a significant amount of thickening and fibrosis of the parietal pleura overlying the diaphragm.  Multiple samples of this are taken using the scissors and submitted for frozen section and this reveals only chronic inflammation.  After I have completely mobilized the right lower lung the inferior aspect of it is firm and consolidated.  Using the robotic blue staple load I perform an excision or biopsy of this area and submitted also for frozen section and this reveals chronic inflammation and reactive change but no evidence of malignancy.  Accordingly, attention is turned to the esophagus.  The inferior pulmonary ligament is taken down.  The mediastinal pleura is opened taking a wide patch of pleura overlying the esophagus.  The azygous vein is isolated and taken using a vascular staple load.  Mediastinal lymph node dissection at levels 4R and 7 as well as levels 8M and 8L are systematically removed and submitted to Pathology (the level 8M and 8L nodes are left in continuity with the specimen.  The esophagus is encircled with a Penrose drain affixed with an endoloop and traction on this is used to mobilize the entire thoracic esophagus from the thoracic inlet to the hiatus.  Most of the dissection is performed using the vessel sealer and incorporate soft tissue around the esophagus particularly the middle and lower esophagus.  A 2nd Penrose drain was also placed around the esophagus and secured and 1 Penrose is left just above the hiatus and the 2nd just below the thoracic inlet.  A 24 Macanese chest tube was brought through the assistant port and positioned and tacked in place with a 2 0 chromic.  The right lung was then re-expanded and the instruments and camera are removed and the robot undocked and moved away from the operative field.  The trocar  incisions are closed in the usual fashion.  The chest tube was secured and connected to Pleur-Evac suction.  Exparel solution is infiltrated into the intercostal space behind each port site.  The patient was then placed in the supine position on the operating table with a small shoulder roll and a pad beneath the left flank.  The double-lumen endotracheal tube was exchanged for a single-lumen tube.  An orogastric tube is placed.  The abdomen anterior chest and left neck were then prepped and draped in sterile fashion.  I attempt to obtain minimally invasive access to the abdomen.  The Veress needle was introduced just to the right of the umbilicus and intraperitoneal positioning is confirmed.  Insufflation is carried out without difficulty.  Using an Optiview trocar under direct vision a 5 mm port is placed at approximately newsome's point.  Although we are able to gain access it becomes immediately apparent that there are extremely dense and multiple adhesions throughout the abdomen and that an attempt at robotic access is futile.  I therefore make an upper midline incision.  It then requires at least 2 hours of dissection to take down the adhesions.  These adhesions are particularly dense in the upper abdomen and left subhepatic space.  They were taken down extremely carefully to avoid injury to the right gastroepiploic vascular arcade.  However, there are also extensive small bowel adhesions and adhesions in the pelvis from the patient's previous pelvic surgery.  At length the lesser sac is opened and the right gastroepiploic vascular pedicle is identified and isolated and mobilized down to the base of the mesentery.  This pedicle is not quite as robust as I had hoped but does appear adequate.  The entire greater curvature aspect of the stomach is then mobilized using the LigaSure or.  Additional adhesions in the lesser sac were taken down.  In gaining access the anterior aspect of the pylorus is densely adherent  to the undersurface of the right upper abdominal wall and there is a tongue of omentum of around it and as I take this down it appears that the patient has a perforated ulcer at this point about 8 mm in diameter which has walled off.  I extend this opening on to the duodenal bulb and the antrum and convert it to a Heineke-Mikulicz type of pyloroplasty.  The left gastric pedicle was then elevated and lymph node dissection is started along the common hepatic and proximal splenic artery was mobilizing these nodes up with the left gastric vessels which were individually taken between 2-0 silk ties.  A Kocher maneuver is performed.  This patient has known gallstones and there are changes of chronic cholecystitis and I therefore proceed with cholecystectomy in the usual top-down fashion.  Are stomach is now entirely mobilized but there are a few areas where it is bound to itself and foreshortened by the shins and I take these down carefully.  Dissection within the right alicia then communicates with her previous thoracic dissection and the Penrose drain along the lower thoracic esophagus is brought into the abdomen and traction on it is used to finalize the mobilization of the gastroesophageal junction.  This Penrose was then removed.  Our left cervical counter incision is then made and deepened medial to the sternocleidomastoid and the left carotid.  The prevertebral space is entered and the esophagus is elevated.  The Penrose drain which had been previously placed around the upper thoracic esophagus is drawn up into the neck and traction on it is used to facilitate mobilization of the lower cervical esophagus.  Our specimen is now entirely free.  The lower cervical esophagus is divided using a purple load of the [employer] stapler.  A Penrose drain is affixed to the lower end and the esophagus is extracted from the mediastinum and brought out through our abdominal incision.  Resection is then completed by using the ERNESTINE  green tissue cartridge to transect the stomach, including the GE junction, cardia, and most of the lesser curve on block with the esophagus and the attached lymph nodes.  This creates a long gastric conduit based on the right gastroepiploic vessels.  The upper aspect of the conduit is hitched to our Penrose drain and traction on the drain then elevates the conduit up to the neck and we do have adequate length and the conduit appears to be adequately vascularized.  In the neck a side-to-side ERNESTINE stapled cervical esophagogastrostomy was performed using a purple load of the [employer] stapler.  The transverse enterotomies were closed with interrupted 3 0 Vicryl.  The neck is then irrigated with sterile saline and closed over a 19 Luxembourgish Mina drain.  Attention is then returned to the abdomen.  The hiatus is actually fairly snug around the conduit and I do not want to narrow it any further so no crural repair was performed.  The transverse colon was elevated and the ligament of Treitz is identified.  Unfortunately, there are extensive small bowel adhesions and the proximal jejunum just beyond the ligament of Treitz is headed straight away down to the pelvis and densely bound down by seizure ends of.  I attempt to mobilize these for about 30 minutes and it becomes more more apparent that in order to do so I will probably need to spend more than an additional hour and also extend our incision and I therefore elected not to place a feeding jejunostomy.  We will plan to place the patient on TPN postoperatively.  The abdomen is then explored hemostasis is good Exparel solution was infiltrated into the deep muscular layers on either side of the incision.  The abdominal incision is then closed in layers in usual fashion and a sterile dressing is applied.  This is been a difficult procedure requiring at least 2 and more likely 3 hours of additional dissection because of the adhesions mostly in the abdomen but also in the right  pleural space.  A 22 modifier has been added to reflect this fact and the extra work involved.  The patient tolerates the procedure well and brought to the recovery room in stable condition.    EBL: 250 ccs  CPT code:

## 2022-11-02 NOTE — PLAN OF CARE
[facility]       HOME  HEALTH ORDERS                                    FACE TO FACE ENCOUNTER      Patient Name: Angelita Camara  YOB: 1960    PCP: Yaakov Dan II, MD   PCP Address: [address] [address] [place] / [place] [address]  PCP Phone Number: [phone]  PCP Fax: [phone]    Encounter Date: 11/02/2022    Admit to Home Health    Diagnoses:  There are no hospital problems to display for this patient.      Future Appointments   Date Time Provider Department Center   11/8/2022  1:45 PM Yoshi Martinez MD [facility]   11/11/2022 10:30 AM Evi Rodriguez DO [facility] [place]           I have seen and examined this patient face to face today. My clinical findings that support the need for the home health skilled services and home bound status are the following:  Weakness/numbness causing balance and gait disturbance due to Surgery making it taxing to leave home.    Allergies:  Review of patient's allergies indicates:   Allergen Reactions    Shellfish containing products Nausea And Vomiting     All seafood    Topamax [topiramate] Nausea And Vomiting and Other (See Comments)     Vertigo         Diet: ______    Activities: activity as tolerated    Nursing:   SN to complete comprehensive assessment including routine vital signs. Instruct on disease process and s/s of complications to report to MD. Review/verify medication list sent home with the patient at time of discharge  and instruct patient/caregiver as needed. Frequency may be adjusted depending on start of care date.    Notify MD if SBP > 160 or < 90; DBP > 90 or < 50; HR > 120 or < 50; Temp > 101    CONSULTS:    Physical Therapy to evaluate and treat. Evaluate for home safety and equipment needs; Establish/upgrade home exercise program. Perform / instruct on therapeutic exercises, gait training, transfer training, and Range of Motion.  Occupational Therapy to evaluate and treat.  Evaluate home environment for safety and equipment needs. Perform/Instruct on transfers, ADL training, ROM, and therapeutic exercises.    Medications: Review discharge medications with patient and family and provide education.      Current Discharge Medication List        CONTINUE these medications which have NOT CHANGED    Details   amiodarone (PACERONE) 200 MG Tab Take 1 tablet (200 mg total) by mouth once daily.  Qty: 30 tablet, Refills: 1      HYDROcodone-acetaminophen (NORCO) 7.5-325 mg per tablet Take 1 tablet by mouth every 6 (six) hours as needed for Pain.      losartan (COZAAR) 100 MG tablet Take 1 tablet (100 mg total) by mouth once daily.  Qty: 30 tablet, Refills: 1    Comments: .      multivitamin (THERAGRAN) per tablet Take 1 tablet by mouth once daily.      ondansetron (ZOFRAN-ODT) 8 MG TbDL Take 8 mg by mouth daily as needed (nausea/vomiting).      prochlorperazine (COMPAZINE) 10 MG tablet Take 10 mg by mouth nightly as needed (nausea/vomiting).             Disciplines requested: Nursing Services to provide:   Other: S/P Esophagectomy 11/2/22     Empty vasiliy drain to left neck every 12 hours and record output     Monitor abdomen for redness, oozing from staple site, abdominal distension, or pain not controlled by pain medication.     Vitals signs daily. Call MD with Temperature > 101, SBP > 180, HR < 50.     Physician to follow patient's care (the person listed here will be responsible for signing ongoing orders): Other: Dr. Melo Schafer, Dr. KWABENA Do, Dr. Phill Schafer [phone], [phone] or [phone] office [phone] fax Requested Start of Care Date: 11/2/2022    I certify that this patient is confined to her home and needs intermittent skilled nursing care, physical therapy, and occupational therapy.          Electronically Signed  _________________________________  Makayla Arreguin NP  11/02/2022

## 2022-11-02 NOTE — ANESTHESIA PROCEDURE NOTES
Arterial    Diagnosis: Esophogeal cancer    Patient location during procedure: done in OR    Staffing  Authorizing Provider: Angel Rodriguez MD  Performing Provider: Vincent Carlisle MD    Anesthesiologist was present at the time of the procedure.    Preanesthetic Checklist  Completed: patient identified, IV checked, site marked, risks and benefits discussed, monitors and equipment checked, pre-op evaluation, timeout performed and anesthesia consent givenArterial  Skin Prep: chlorhexidine gluconate  Local Infiltration: none  Orientation: left  Location: radial    Catheter Size: 20 G  Catheter placement by Anatomical landmarks. Heme positive aspiration all ports. Insertion Attempts: 1  Assessment  Dressing: secured with tape and tegaderm  Patient: Tolerated well

## 2022-11-02 NOTE — H&P
FOCUSED SURGICAL H&P    Angelita Camara is a 61 y.o. female. MRN is [id number].    CC: Here today for the following surgical procedure(s):  XI ROBOTIC ESOPHAGECTOMY (Chest)    HPI: For a detailed history of the patients history of present illness please refer to the last progress note. In brief, this is a 61 y.o. female with a known history of esophageal adenocarcinoma, here today for surgical intervention. There has been no recent changes in the patients health, including fevers, chest pain, or shortness of breath, and no new medications have been started. The patient has not had anything to eat or drink for the last 8 hours.        Past Medical History:   Past Medical History:   Diagnosis Date    Bicytopenia 2022    Cancer     Chronic back pain     H/O: UGI bleed     Hypertension     PUD (peptic ulcer disease)     Spondylolisthesis at L5-S1 level        Past Surgical History:   Past Surgical History:   Procedure Laterality Date    BREAST BIOPSY Right     Benign    CATARACT EXTRACTION, BILATERAL  2018, 2018    CERVICAL SPINE SURGERY       SECTION      COLONOSCOPY  2019    Dr Case    ENDOSCOPIC NASAL CAUTERIZATION Bilateral 2022    Procedure: CAUTERIZATION, NOSE, ENDOSCOPIC;  Surgeon: Fabiano Cisneros MD;  Location: [facility];  Service: ENT;  Laterality: Bilateral;    ENDOSCOPIC ULTRASOUND OF UPPER GASTROINTESTINAL TRACT Left 2022    Procedure: ULTRASOUND, UPPER GI TRACT, ENDOSCOPIC;  Surgeon: Matt Alonso MD;  Location: [facility];  Service: Endoscopy;  Laterality: Left;    ESOPHAGOGASTRODUODENOSCOPY N/A 2022    Procedure: EGD (ESOPHAGOGASTRODUODENOSCOPY);  Surgeon: Matt Alonso MD;  Location: [facility];  Service: Endoscopy;  Laterality: N/A;    HYSTERECTOMY      total    INSERTION OF TUNNELED CENTRAL VENOUS CATHETER (CVC) WITH SUBCUTANEOUS PORT Right 2022    Procedure: [id number];  Surgeon: Angel Cedeno MD;  Location: [facility];  Service:  General;  Laterality: Right;  Right Subclavian    OOPHORECTOMY      PEPTIC ULCER REMOVAL      SPINAL FUSION         Social History:   Social History     Socioeconomic History    Marital status:     Number of children: 2   Occupational History    Occupation: disabled   Tobacco Use    Smoking status: Every Day     Packs/day: 0.50     Types: Cigarettes     Start date: 1977    Smokeless tobacco: Never   Substance and Sexual Activity    Alcohol use: Yes     Comment: occasional    Drug use: No    Sexual activity: Yes     Partners: Male       Family History:   Family History   Problem Relation Age of Onset    Heart disease Mother     Heart disease Father     Diabetes Father     Cancer Maternal Grandmother           Allergies:  Review of patient's allergies indicates:   Allergen Reactions    Shellfish containing products Nausea And Vomiting     All seafood    Topamax [topiramate] Nausea And Vomiting and Other (See Comments)     Vertigo           Medications:    Current Facility-Administered Medications:     0.9%  NaCl infusion (for blood administration), , Intravenous, Q24H PRN, Floyd Sheppard MD    0.9%  NaCl infusion, , Intravenous, Continuous, Floyd Sheppard MD, Last Rate: 70 mL/hr at 11/02/22 0702, New Bag at 11/02/22 0702    ceFAZolin in sterile water 2 gram/20 mL IV syringe 2,000 mg, 2 g, Intravenous, On Call Procedure, Floyd Sheppard MD    enoxaparin injection 30 mg, 30 mg, Subcutaneous, On Call Procedure, Shannan Willingham NP, 30 mg at 11/02/22 0725                  Vital Signs:  Vitals:    11/02/22 0643   BP:    Pulse: 79   Resp: 16   Temp:          Physical Exam:  Neuro: awake, alert, no acute distress.  HEENT: PERRLA, neck supple, no lymphadenopathy.  Heart: regular rate/rhythm  Lungs: equal chest expansion bilaterally, no increased work of breathing on RA  Abdomen: soft, non-distended, non-tender to palpation.  Extremities: warm, well-perfused       Labs:  Lab Results   Component Value Date/Time    WBC  6.96 11/01/2022 09:29 AM    HGB 10.5 (L) 11/01/2022 09:29 AM    HCT 30.5 (L) 11/01/2022 09:29 AM    PLT 74 (L) 11/01/2022 09:29 AM    BAND 2.0 09/01/2022 03:36 AM     (H) 11/01/2022 09:29 AM     Lab Results   Component Value Date/Time     (L) 11/01/2022 09:29 AM    K 3.8 11/01/2022 09:29 AM     11/01/2022 09:29 AM    CO2 23 11/01/2022 09:29 AM    BUN 11 11/01/2022 09:29 AM     11/01/2022 09:29 AM    MG 2.1 09/01/2022 03:36 AM     Lab Results   Component Value Date/Time    INR 1.0 10/21/2022 07:44 AM     No components found for: TROPI  Lab Results   Component Value Date/Time    ALT 12 11/01/2022 09:29 AM    AST 14 11/01/2022 09:29 AM          Assessment/Plan:  61 y.o. female here today for the following surgical procedure:    XI ROBOTIC ESOPHAGECTOMY (Chest)       The indications for surgery, highlighting the risks and benefits of the procedure were discussed with the patient. These included but are not limited to swelling, bleeding, pain, infection, and adverse anesthesia-related event. I also discussed risk of injury to nearby structures, leak, and need for additional procedures. The patient seems to understand the risks, as well as the alternatives including nonoperative observation/survellience and wishes to proceed with the surgical intervention.    Patient has been examined and consented.      Floyd Sheppard MD  General Surgery, PGY 1

## 2022-11-02 NOTE — PROGRESS NOTES
Paged resident jeni re need for surgical consent, Dr Sheppard stated Dr Schafer would obtain consent.

## 2022-11-03 PROBLEM — Z98.890 H/O ESOPHAGECTOMY: Status: ACTIVE | Noted: 2022-01-01

## 2022-11-03 PROBLEM — Z90.49 H/O ESOPHAGECTOMY: Status: ACTIVE | Noted: 2022-01-01

## 2022-11-03 NOTE — ANESTHESIA RELEASE NOTE
"Anesthesia Release from PACU Note    Patient: Angelita Camara    Procedure(s) Performed: Procedure(s) (LRB):  XI ROBOTIC ESOPHAGECTOMY Converted to open at 1215  (N/A)  LYSIS, ADHESIONS  VATS, WITH WEDGE RESECTION, LUNG (Right)  CHOLECYSTECTOMY  ESOPHAGECTOMY    Anesthesia type: general    Post pain: Adequate analgesia    Post assessment: no apparent anesthetic complications and tolerated procedure well    Last Vitals:   Visit Vitals  /62   Pulse 91   Temp 37.5 °C (99.5 °F) (Temporal)   Resp (!) 21   Ht 5' 4" (1.626 m)   Wt 47.6 kg (104 lb 15 oz)   SpO2 (!) 90%   Breastfeeding No   BMI 18.01 kg/m²       Post vital signs: stable    Level of consciousness: awake and alert     Nausea/Vomiting: no nausea/no vomiting    Complications: none    Airway Patency: patent    Respiratory: unassisted, spontaneous ventilation    Cardiovascular: stable and blood pressure at baseline    Hydration: euvolemic  "

## 2022-11-03 NOTE — SUBJECTIVE & OBJECTIVE
Interval History:   Did well overnight, HDS  Plts were 45 post-op; received 1 pack plts and increased to 51 only  Thrombocytopenia in the past as low as 2 attributed to chemo, but ITP is on differential  Drains are bloody  Starting TPN tonight through her port    Medications:  Continuous Infusions:   sodium chloride 0.9% 100 mL/hr at 11/03/22 0413    hydromorphone in 0.9 % NaCl 6 mg/30 ml       Scheduled Meds:   enoxaparin  30 mg Subcutaneous Daily    famotidine (PF)  20 mg Intravenous BID    levalbuterol  0.63 mg Nebulization Q6H WAKE    nicotine  1 patch Transdermal Daily     PRN Meds:sodium chloride, calcium gluconate IVPB, calcium gluconate IVPB, calcium gluconate IVPB, ceFAZolin (ANCEF) IVPB, enoxaparin, hydrALAZINE, magnesium sulfate IVPB, magnesium sulfate IVPB, naloxone, ondansetron, potassium chloride **AND** potassium chloride **AND** potassium chloride, sodium chloride 0.9%, sodium chloride 0.9%, sodium phosphate IVPB, sodium phosphate IVPB, sodium phosphate IVPB     Review of patient's allergies indicates:   Allergen Reactions    Shellfish containing products Nausea And Vomiting     All seafood    Topamax [topiramate] Nausea And Vomiting and Other (See Comments)     Vertigo       Objective:     Vital Signs (Most Recent):  Temp: 98.3 °F (36.8 °C) (11/03/22 0305)  Pulse: 78 (11/03/22 0730)  Resp: 12 (11/03/22 0730)  BP: (!) 137/58 (11/03/22 0600)  SpO2: 100 % (11/03/22 0730)   Vital Signs (24h Range):  Temp:  [98 °F (36.7 °C)-98.3 °F (36.8 °C)] 98.3 °F (36.8 °C)  Pulse:  [59-83] 78  Resp:  [10-22] 12  SpO2:  [95 %-100 %] 100 %  BP: (113-169)/(55-77) 137/58  Arterial Line BP: (129-168)/(56-69) 147/64     Weight: 47.6 kg (104 lb 15 oz)  Body mass index is 18.01 kg/m².    Intake/Output - Last 3 Shifts         11/01 0700 11/02 0659 11/02 0700 11/03 0659 11/03 0700 11/04 0659    I.V. (mL/kg)  2078.5 (43.7)     Blood  685     IV Piggyback  3598.7     Total Intake(mL/kg)  6362.2 (133.7)     Urine (mL/kg/hr)   900 (0.8)     Drains  25     Blood  250     Chest Tube  500     Total Output  1675     Net  +4687.2                    Physical Exam  Vitals and nursing note reviewed.   Constitutional:       Appearance: Normal appearance.   HENT:      Mouth/Throat:      Comments: Left neck incision is closed  Neck drain is bloody without swelling    Cardiovascular:      Rate and Rhythm: Normal rate and regular rhythm.      Pulses: Normal pulses.   Pulmonary:      Effort: Pulmonary effort is normal.      Comments: Right chest tube without air leak with SS drainage  Blood on the dressing  Incisions are clean, dry and intact      Abdominal:      Palpations: Abdomen is soft.      Comments: Midline incision is clean, dry and intact     Musculoskeletal:         General: No swelling.   Skin:     General: Skin is warm.      Capillary Refill: Capillary refill takes less than 2 seconds.   Neurological:      General: No focal deficit present.      Mental Status: She is alert.       Significant Labs:  I have reviewed all pertinent lab results within the past 24 hours.  CBC:   Recent Labs   Lab 11/03/22  0342   WBC 10.68   RBC 2.35*   HGB 8.2*   HCT 24.0*   PLT 49*   *   MCH 34.9*   MCHC 34.2     CMP:   Recent Labs   Lab 11/03/22  0342   *   CALCIUM 8.2*   ALBUMIN 2.9*   PROT 5.3*   *   K 4.3   CO2 20*      BUN 16   CREATININE 0.7   ALKPHOS 56   ALT 25   AST 40   BILITOT 0.7     Coagulation:   Recent Labs   Lab 11/02/22  1746   LABPROT 11.2   INR 1.1   APTT 37.0*       Significant Diagnostics:  I have reviewed all pertinent imaging results/findings within the past 24 hours.

## 2022-11-03 NOTE — PROGRESS NOTES
[facility] ([facility])  Critical Care - Surgery  Progress Note    Patient Name: Angelita Camara  MRN: [id number]  Admission Date: 11/2/2022  Hospital Length of Stay: 1 days  Code Status: Full Code  Attending Provider: Melo Schafer MD  Primary Care Provider: Yaakov Dan II, MD   Principal Problem: Squamous cell esophageal cancer    Subjective:     Hospital/ICU Course:  62yo F with PMH HTN, PUD, current smoker, perforated gastric ulcer s/p Richard patch, and SCC of the esophagus now s/p robotic assisted esophagectomy with open abdominal portion on 11/2. Patient does not have enteral access. Robotic assisted esophagectomy with open abdominal portion on 11/2 - patient admitted directly to SICU after OR.       Interval History/Significant Events: No acute events overnight. Patient received 1 unit platelets yesterday.     Follow-up For: Procedure(s) (LRB):  XI ROBOTIC ESOPHAGECTOMY Converted to open at 1215  (N/A)  LYSIS, ADHESIONS  VATS, WITH WEDGE RESECTION, LUNG (Right)  CHOLECYSTECTOMY  ESOPHAGECTOMY    Post-Operative Day: 1 Day Post-Op    Objective:     Vital Signs (Most Recent):  Temp: 98.3 °F (36.8 °C) (11/03/22 0305)  Pulse: 76 (11/03/22 0500)  Resp: 18 (11/03/22 0500)  BP: (!) 115/56 (11/03/22 0400)  SpO2: 95 % (11/03/22 0500)   Vital Signs (24h Range):  Temp:  [97.7 °F (36.5 °C)-98.3 °F (36.8 °C)] 98.3 °F (36.8 °C)  Pulse:  [59-79] 76  Resp:  [11-22] 18  SpO2:  [95 %-100 %] 95 %  BP: (110-169)/(55-77) 115/56  Arterial Line BP: (129-168)/(56-69) 143/61     Weight: 47.6 kg (104 lb 15 oz)  Body mass index is 18.01 kg/m².      Intake/Output Summary (Last 24 hours) at 11/3/2022 0545  Last data filed at 11/3/2022 0500  Gross per 24 hour   Intake 6362.2 ml   Output 1615 ml   Net 4747.2 ml     Physical Exam  HENT:      Head: Normocephalic and atraumatic.      Nose: Nose normal.   Eyes:      Extraocular Movements: Extraocular movements intact.      Pupils: Pupils are equal, round, and reactive to light.   Neck:       Comments: ARMINDA drain in neck   Cardiovascular:      Rate and Rhythm: Normal rate. Rhythm irregular.   Pulmonary:      Effort: Pulmonary effort is normal.      Breath sounds: Normal breath sounds.      Comments: Right sided chest tube draining bloody fluid  Abdominal:      General: Abdomen is flat.      Palpations: Abdomen is soft.      Tenderness: There is abdominal tenderness.      Comments: Midline incision, abdominal binder, appropriately tender   Musculoskeletal:         General: Normal range of motion.      Cervical back: Normal range of motion.   Skin:     General: Skin is warm and dry.   Neurological:      General: No focal deficit present.      Mental Status: She is alert and oriented to person, place, and time.   Psychiatric:         Mood and Affect: Mood normal.         Behavior: Behavior normal.     Vents:       Lines/Drains/Airways       Drain  Duration                  Chest Tube 11/02/22 Right Midaxillary 24 Fr. 1 day         Urethral Catheter 11/02/22 Non-latex 16 Fr. 1 day         Closed/Suction Drain 11/02/22 1630 Left;Superior Chest Bulb 19 Fr. <1 day              Arterial Line  Duration             Arterial Line 11/02/22 0922 Left Radial <1 day              Peripheral Intravenous Line  Duration                  Peripheral IV - Single Lumen 16 G Right Antecubital -- days         Peripheral IV - Single Lumen 11/02/22 0702 18 G Anterior;Distal;Left Wrist <1 day                    Significant Labs:    CBC/Anemia Profile:  Recent Labs   Lab 11/02/22  1603 11/02/22  1636 11/02/22  1746 11/03/22  0342   WBC 5.39  --  7.28 10.68   HGB 8.2*  --  9.5* 8.2*   HCT 24.2* 23* 29.0* 24.0*   PLT 45*  --  51* 49*   *  --  106* 102*   RDW 18.8*  --  SEE COMMENT SEE COMMENT        Chemistries:  Recent Labs   Lab 11/01/22  0929 11/02/22  1746 11/03/22  0342   * 136 133*   K 3.8 3.7 4.3    108 107   CO2 23 19* 20*   BUN 11 11 16   CREATININE 0.8 0.6 0.7   CALCIUM 9.8 8.1* 8.2*   ALBUMIN 3.1* 2.9* 2.9*    PROT 6.8 4.8* 5.3*   BILITOT 0.2 1.1* 0.7   ALKPHOS 90 52* 56   ALT 12 26 25   AST 14 47* 40   MG  --  1.8 2.2   PHOS  --  4.1 3.1       All pertinent labs within the past 24 hours have been reviewed.    Significant Imaging:  I have reviewed all pertinent imaging results/findings within the past 24 hours.  Assessment/Plan:     Atrial fibrillation    Neuro/Psych:   -- Sedation: none   -- Pain: PCA pump  -- PT/OT ordered             Cards:   -- HDS  -- hx of a fib - on amiodarone at home, if patient in a fib will give metop, and if in RVR will bolus amio and then start a gtt      Pulm:   -- Current smoker 1/2 PPD, COPD  -- Nicotine patch  -- Goal O2 sat > 90%  -- Wean as able      Renal:  -- Keep villegas for strict I/O  -- 100ml/hr maintenance fluids  -- BUN/Cr 16/0.7      FEN / GI:   -- Replace lytes as needed  -- Nutrition: NPO  -- Nutrition consult placed, rec's appreciated  -- No enteral access due to significant adhesions      ID:   -- Tm: afebrile; WBC 10.68  -- Abx not indicated      Heme/Onc:   -- H/H decreased from yesterday 9.5/29 to 8.2/24   -- Platelets 45 intra-op - receiving one unit platelets 11/2, will receive another unit today as platelets remain 49  -- Daily CBC      Endo:   -- Gluc goal 140-180  -- No hx DM      PPx:   Feeding: NPO  Analgesia/Sedation: PCA pump / none  Thromboembolic prevention: lovenox   HOB >30: Yes  Stress Ulcer ppx: famotidine  Glucose control: Critical care goal 140-180 g/dl    Lines/Drains/Airway: Left sided vasiliy drain (smell breath to see if there is any conduit necrosis), right sided chest tube, villegas,       Dispo/Code Status/Palliative:   -- SICU / Full Code     Torrie Ruiz MD  Critical Care - Surgery  [facility] - [facility] (2nd Fl)

## 2022-11-03 NOTE — ASSESSMENT & PLAN NOTE
61F with history of thrombocytopenia, Afib, smoker s/p Tyrone esophagectomy on 11/2. Had previous gastric perforation complicated by takeback for abdominal abscess and gastroepiploic pedicle was not robust. However, conduit appeared well perfused. Had biopsy of concerning right pleural based lung lesion was negative for malignancy. No Jtube was placed due to adhesions.    NPO (occasional mouth swabs are OK)  Consult to heme-onc to evaluate for immune thrombocytopenia so we have a contingency plan (she did not respond to platelet transfusion last night)  Discontinue sin and villegas this AM  Start TPN (no Jtube)  Start IV amiodarone (was on PO at home but only for one month so needs to continue on with IV)  Keep chest tube to suction

## 2022-11-03 NOTE — ASSESSMENT & PLAN NOTE
  Neuro/Psych:   -- Sedation: none   -- Pain: PCA pump  -- PT/OT ordered             Cards:   -- HDS  -- hx of a fib - on amiodarone at home, if patient in a fib will give metop, and if in RVR will bolus amio and then start a gtt      Pulm:   -- Current smoker 1/2 PPD, COPD  -- Nicotine patch  -- Goal O2 sat > 90%  -- Wean as able      Renal:  -- Keep villegas for strict I/O  -- 100ml/hr maintenance fluids  -- BUN/Cr 16/0.7      FEN / GI:   -- Replace lytes as needed  -- Nutrition: NPO  -- Nutrition consult placed, rec's appreciated  -- No enteral access due to significant adhesions      ID:   -- Tm: afebrile; WBC 10.68  -- Abx not indicated      Heme/Onc:   -- H/H decreased from yesterday 9.5/29 to 8.2/24   -- Platelets 45 intra-op - receiving one unit platelets 11/2, will receive another unit today as platelets remain 49  -- Daily CBC      Endo:   -- Gluc goal 140-180  -- No hx DM      PPx:   Feeding: NPO  Analgesia/Sedation: PCA pump / none  Thromboembolic prevention: lovenox   HOB >30: Yes  Stress Ulcer ppx: famotidine  Glucose control: Critical care goal 140-180 g/dl    Lines/Drains/Airway: Left sided vasiliy drain (smell breath to see if there is any conduit necrosis), right sided chest tube, villegas,       Dispo/Code Status/Palliative:   -- SICU / Full Code

## 2022-11-03 NOTE — CONSULTS
[facility] ([facility])  Hematology/Oncology  Consult Note    Patient Name: Angelita Camara  MRN: [id number]  Admission Date: 11/2/2022  Hospital Length of Stay: 1 days  Code Status: Full Code   Attending Provider: Melo Schafer MD  Consulting Provider: Chelsea Godinez DO  Primary Care Physician: Yaakov Dan II, MD  Principal Problem:Squamous cell esophageal cancer    Inpatient consult to Hematology/Oncology  Consult performed by: Shailesh Godinez DO  Consult ordered by: SELMA Polanco MD        Subjective:     HPI:  Mrs. Camara is a 61 F with SCC of the distal esophagus (initial stage II) s/p NACR with carbo/taxol, hx of UGIB and Afib who presents for admission for planned robotic esophagectomy. Patient is followed by Dr. Schafer, Dr. Urbano and Dr. Garcia in [facility]. Patient was initially evaluated by PCP then oncology for dysphagia and weight loss. 4.5 cm esophageal mass starting at 31cm from incisiors. Pathology revealed poorly differentiated SCC. Staging CT found questionable 1.3cm liver lesion and 3 small pulm nodules. Post chemotherapy course was complicated by persistent anemia and thrombocytopenia. Macrocytic anemia with range of 8-9 and has received transfusions on previous admissions. Thrombocytopenia more severe with count as low as 2K shortly after chemotherapy completed but range of 40-70K. During admission from 8/29 to 9/1 patient was given IVIG 2x and Dex 40 mg for 4 days with some improvement from 7K to 23K.     Patient s/p esophagectomy (robotic-open), VATS now admitted to SICU for closer monitoring, with chest tube and ARMINDA drain in place. Serosanguinous output noted Patient reported after last discharge being sent home with prednisone which was las taken on 10/2/22. Patient reports mild bruising of upper extremities, but no large bruises or rashes noted. Patient reports 1 episode of epistaxis since discharge which was self limited. Patient denies any recent GIB or hematuria or other  episodes or blood loss. Patient denies any other complaints including fever or other constitutional symptoms. Hematology was consulted for evaluation of thrombocytopenia in setting of persistent bicytopenia.       Oncology Treatment Plan:   [No matching plan found]    Medications:  Continuous Infusions:   sodium chloride 0.9% 100 mL/hr at 22 0900    amiodarone in dextrose 5% 0.5 mg/min (22 1157)    hydromorphone in 0.9 % NaCl 6 mg/30 ml      Standard [employer] ADULT TPN for patient with NO electrolyte abnormality and NO renal dysfunction ([facility])       Scheduled Meds:   enoxaparin  30 mg Subcutaneous Daily    famotidine (PF)  20 mg Intravenous BID    fat emulsion 20%  250 mL Intravenous Daily    levalbuterol  0.63 mg Nebulization Q6H WAKE    nicotine  1 patch Transdermal Daily     PRN Meds:sodium chloride, calcium gluconate IVPB, calcium gluconate IVPB, calcium gluconate IVPB, hydrALAZINE, magnesium sulfate IVPB, magnesium sulfate IVPB, naloxone, ondansetron, potassium chloride **AND** potassium chloride **AND** potassium chloride, sodium chloride 0.9%, sodium chloride 0.9%, sodium phosphate IVPB, sodium phosphate IVPB, sodium phosphate IVPB     Review of patient's allergies indicates:   Allergen Reactions    Shellfish containing products Nausea And Vomiting     All seafood    Topamax [topiramate] Nausea And Vomiting and Other (See Comments)     Vertigo          Past Medical History:   Diagnosis Date    Bicytopenia 2022    Cancer     Chronic back pain     H/O: UGI bleed     Hypertension     PUD (peptic ulcer disease)     Spondylolisthesis at L5-S1 level      Past Surgical History:   Procedure Laterality Date    BREAST BIOPSY Right     Benign    CATARACT EXTRACTION, BILATERAL  2018, 2018    CERVICAL SPINE SURGERY       SECTION      COLONOSCOPY  2019    Dr Case    ENDOSCOPIC NASAL CAUTERIZATION Bilateral 2022    Procedure: CAUTERIZATION,  NOSE, ENDOSCOPIC;  Surgeon: Fabiano Cisneros MD;  Location: [facility] OR;  Service: ENT;  Laterality: Bilateral;    ENDOSCOPIC ULTRASOUND OF UPPER GASTROINTESTINAL TRACT Left 6/21/2022    Procedure: ULTRASOUND, UPPER GI TRACT, ENDOSCOPIC;  Surgeon: Matt Alonso MD;  Location: [facility] ENDO;  Service: Endoscopy;  Laterality: Left;    ESOPHAGOGASTRODUODENOSCOPY N/A 6/21/2022    Procedure: EGD (ESOPHAGOGASTRODUODENOSCOPY);  Surgeon: Matt Alonso MD;  Location: [facility] ENDO;  Service: Endoscopy;  Laterality: N/A;    HYSTERECTOMY  2011    total    INSERTION OF TUNNELED CENTRAL VENOUS CATHETER (CVC) WITH SUBCUTANEOUS PORT Right 6/23/2022    Procedure: [id number];  Surgeon: Angel Cedeno MD;  Location: [facility];  Service: General;  Laterality: Right;  Right Subclavian    OOPHORECTOMY      PEPTIC ULCER REMOVAL      SPINAL FUSION       Family History       Problem Relation (Age of Onset)    Cancer Maternal Grandmother    Diabetes Father    Heart disease Mother, Father          Tobacco Use    Smoking status: Every Day     Packs/day: 0.50     Types: Cigarettes     Start date: 1977    Smokeless tobacco: Never   Substance and Sexual Activity    Alcohol use: Yes     Comment: occasional    Drug use: No    Sexual activity: Yes     Partners: Male       Review of Systems   Constitutional:  Positive for unexpected weight change. Negative for chills, diaphoresis, fatigue and fever.   HENT:  Negative for nosebleeds and sore throat.    Eyes:  Negative for photophobia, pain and visual disturbance.   Respiratory:  Negative for cough, shortness of breath and wheezing.    Cardiovascular:  Negative for chest pain and palpitations.   Gastrointestinal:  Negative for abdominal distention, abdominal pain, blood in stool, diarrhea, nausea and vomiting.   Genitourinary:  Negative for dysuria, flank pain, frequency, hematuria and urgency.   Musculoskeletal:  Negative for arthralgias, myalgias and neck pain.   Skin:   Negative for pallor and rash.   Neurological:  Negative for dizziness, syncope and headaches.   Hematological:  Bruises/bleeds easily.   Psychiatric/Behavioral:  Negative for behavioral problems, confusion, hallucinations and suicidal ideas.    All other systems reviewed and are negative.  Objective:     Vital Signs (Most Recent):  Temp: 99.5 °F (37.5 °C) (11/03/22 0900)  Pulse: 91 (11/03/22 1445)  Resp: (!) 21 (11/03/22 1445)  BP: 115/62 (11/03/22 1445)  SpO2: (!) 90 % (11/03/22 1445)   Vital Signs (24h Range):  Temp:  [98 °F (36.7 °C)-99.5 °F (37.5 °C)] 99.5 °F (37.5 °C)  Pulse:  [59-93] 91  Resp:  [10-24] 21  SpO2:  [90 %-100 %] 90 %  BP: (105-169)/(55-96) 115/62  Arterial Line BP: (129-168)/(56-69) 140/63     Weight: 47.6 kg (104 lb 15 oz)  Body mass index is 18.01 kg/m².  Body surface area is 1.47 meters squared.      Intake/Output Summary (Last 24 hours) at 11/3/2022 1508  Last data filed at 11/3/2022 1302  Gross per 24 hour   Intake 2962.2 ml   Output 1698 ml   Net 1264.2 ml       Physical Exam  Vitals and nursing note reviewed.   Constitutional:       General: She is not in acute distress.     Appearance: Normal appearance. She is not toxic-appearing.   HENT:      Mouth/Throat:      Comments: Neck drain is bloody   Petechiae noted   Eyes:      Extraocular Movements: Extraocular movements intact.      Pupils: Pupils are equal, round, and reactive to light.   Cardiovascular:      Rate and Rhythm: Normal rate and regular rhythm.      Pulses: Normal pulses.      Heart sounds: No murmur heard.  Pulmonary:      Effort: Pulmonary effort is normal.      Breath sounds: Wheezing present.      Comments: Right chest tube with blood on bandage  Incisions c/d/i      Abdominal:      General: There is no distension.      Palpations: Abdomen is soft.      Comments:      Musculoskeletal:         General: No swelling.      Right lower leg: No edema.      Left lower leg: No edema.   Skin:     General: Skin is warm.       Capillary Refill: Capillary refill takes less than 2 seconds.      Coloration: Skin is not jaundiced.      Findings: Bruising (UE noted) present. No erythema or rash.   Neurological:      General: No focal deficit present.      Mental Status: She is alert.       Significant Labs:   CBC:   Recent Labs   Lab 11/02/22 1746 11/03/22 0342 11/03/22  1325   WBC 7.28 10.68 14.35*   HGB 9.5* 8.2* 8.3*   HCT 29.0* 24.0* 25.0*   PLT 51* 49* 58*   , CMP:   Recent Labs   Lab 11/02/22 1746 11/03/22 0342    133*   K 3.7 4.3    107   CO2 19* 20*   * 124*   BUN 11 16   CREATININE 0.6 0.7   CALCIUM 8.1* 8.2*   PROT 4.8* 5.3*   ALBUMIN 2.9* 2.9*   BILITOT 1.1* 0.7   ALKPHOS 52* 56   AST 47* 40   ALT 26 25   ANIONGAP 9 6*   , Coagulation:   Recent Labs   Lab 11/02/22 1746   INR 1.1   APTT 37.0*   , Haptoglobin: No results for input(s): HAPTOGLOBIN in the last 48 hours., LDH: No results for input(s): LDHCSF, BFSOURCE in the last 48 hours., Reticulocytes:   Recent Labs   Lab 11/03/22  1325   RETIC 1.9   , and All pertinent labs from the last 24 hours have been reviewed.    Diagnostic Results:  I have reviewed all pertinent imaging results/findings within the past 24 hours.    Assessment/Plan:     Thrombocytopenia  Platelets   Date Value Ref Range Status   11/03/2022 49 (L) 150 - 450 K/uL Final       Patient does not have any hepatosplenomegaly  Patient does not have any known liver disease  Patient does have any associated anemia  Patient does not have any severe valvulopathy     Patient is a 62 yo F with previously stated problems for which hematology was consulted for evaluation of thrombocytopenia. Platelet range of 40-70K. Mild petechiae noted in oropharynx, bruising of UE and SS output from drains. Medication review completed.     Consideration for ITP given previous response to IVIG and steroids most likely, nutritional deficiency given macrocytic anemia in setting of esophageal CA with known poor intake, vs  marrow infiltrative process given possible metastatic CA less likely . Given the improvement in PLT count with IVIG and steroids will use in case of bleeding or worsening of thrombocytopenia. Lower concern for TTP or MAHA at this time with normal renal function and bilirubin. Anemia stable at this time but macrocytosis noted on labs. RI of 1.1 which can indicate hypoproliferative process which is confounded by chemotherapy, radiation and recent procedures.    Plan:  - Given PLT count of 49K and relatively stable bleeding post operatively, will hold on any intervention at this time  - daily CBC  - would consider using IVIG vs steroids if PLT count does drop below 30K  - if acute bleeding episode would also give platelets and would start IVIG and steroids  - Labs ordered:     - Pathologist review of smear     - HIV 1/2 and hepatitis B and C     - retic, LDH, haptoglobin      - B12, copper and folate       Case discussed with Dr. Lopez with full attestation to follow        Thank you for your consult. I will follow-up with patient. Please contact us if you have any additional questions.    Chelsea Godinez, DO  Hematology/Oncology  [facility] (2nd Fl)

## 2022-11-03 NOTE — HPI
62yo F with PMH HTN, PUD, current smoker, perforated gastric ulcer s/p Richard patch, and SCC of the esophagus now s/p robotic assisted esophagectomy with open abdominal portion on 11/2. Patient does not have enteral access.

## 2022-11-03 NOTE — PROGRESS NOTES
SICU resident notified of electrolyte levels. Okay with Ca level of 8.1, hold off on replacing. Will replace for K and Mag. Also notified of subcutaneous emphysema right above right chest tube site, okay with this at this time. VSS. WCTM.

## 2022-11-03 NOTE — H&P
Lul demarcus - Surgery ([facility])  Critical Care - Surgery  History & Physical    Patient Name: Angelita Camara  MRN: [id number]  Admission Date: 2022  Code Status: Full Code  Attending Physician: Melo Schafer MD   Primary Care Provider: Yaakov Dan II, MD   Principal Problem: Squamous cell esophageal cancer    Subjective:     HPI: 60yo F with PMH HTN, PUD, current smoker, perforated gastric ulcer s/p Richard patch, and SCC of the esophagus now s/p robotic assisted esophagectomy with open abdominal portion on . Patient does not have enteral access.     Hospital/ICU Course: Robotic assisted esophagectomy with open abdominal portion on  - patient admitted directly to SICU after OR.     Follow-up For: Procedure(s) (LRB):  XI ROBOTIC ESOPHAGECTOMY Converted to open at 1215  (N/A)  LYSIS, ADHESIONS  VATS, WITH WEDGE RESECTION, LUNG (Right)  CHOLECYSTECTOMY  ESOPHAGECTOMY    Post-Operative Day: Day of Surgery     Past Medical History:   Diagnosis Date    Bicytopenia 2022    Cancer     Chronic back pain     H/O: UGI bleed     Hypertension     PUD (peptic ulcer disease)     Spondylolisthesis at L5-S1 level        Past Surgical History:   Procedure Laterality Date    BREAST BIOPSY Right     Benign    CATARACT EXTRACTION, BILATERAL  2018, 2018    CERVICAL SPINE SURGERY       SECTION      COLONOSCOPY  2019    Dr Case    ENDOSCOPIC NASAL CAUTERIZATION Bilateral 2022    Procedure: CAUTERIZATION, NOSE, ENDOSCOPIC;  Surgeon: Fabiano Cisneros MD;  Location: [facility] OR;  Service: ENT;  Laterality: Bilateral;    ENDOSCOPIC ULTRASOUND OF UPPER GASTROINTESTINAL TRACT Left 2022    Procedure: ULTRASOUND, UPPER GI TRACT, ENDOSCOPIC;  Surgeon: Matt Alonso MD;  Location: [facility];  Service: Endoscopy;  Laterality: Left;    ESOPHAGOGASTRODUODENOSCOPY N/A 2022    Procedure: EGD (ESOPHAGOGASTRODUODENOSCOPY);  Surgeon: Matt Alonso MD;  Location: [facility];  Service:  Endoscopy;  Laterality: N/A;    HYSTERECTOMY  2011    total    INSERTION OF TUNNELED CENTRAL VENOUS CATHETER (CVC) WITH SUBCUTANEOUS PORT Right 6/23/2022    Procedure: [id number];  Surgeon: Angel Cedeno MD;  Location: [facility];  Service: General;  Laterality: Right;  Right Subclavian    OOPHORECTOMY      PEPTIC ULCER REMOVAL      SPINAL FUSION         Review of patient's allergies indicates:   Allergen Reactions    Shellfish containing products Nausea And Vomiting     All seafood    Topamax [topiramate] Nausea And Vomiting and Other (See Comments)     Vertigo         Family History       Problem Relation (Age of Onset)    Cancer Maternal Grandmother    Diabetes Father    Heart disease Mother, Father          Tobacco Use    Smoking status: Every Day     Packs/day: 0.50     Types: Cigarettes     Start date: 1977    Smokeless tobacco: Never   Substance and Sexual Activity    Alcohol use: Yes     Comment: occasional    Drug use: No    Sexual activity: Yes     Partners: Male      Review of Systems   All other systems reviewed and are negative.  Objective:     Vital Signs (Most Recent):  Temp: 98.1 °F (36.7 °C) (11/02/22 1845)  Pulse: 65 (11/02/22 1915)  Resp: 16 (11/02/22 1915)  BP: 121/61 (11/02/22 1915)  SpO2: 95 % (11/02/22 1915)   Vital Signs (24h Range):  Temp:  [97.7 °F (36.5 °C)-98.1 °F (36.7 °C)] 98.1 °F (36.7 °C)  Pulse:  [60-79] 65  Resp:  [14-21] 16  SpO2:  [95 %-100 %] 95 %  BP: (110-151)/(55-67) 121/61  Arterial Line BP: (129-150)/(60-67) 148/67     Weight: 47.6 kg (104 lb 15 oz)  Body mass index is 18.01 kg/m².      Intake/Output Summary (Last 24 hours) at 11/2/2022 1924  Last data filed at 11/2/2022 1711  Gross per 24 hour   Intake 4600 ml   Output 585 ml   Net 4015 ml       Physical Exam  HENT:      Head: Normocephalic and atraumatic.      Nose: Nose normal.   Eyes:      Extraocular Movements: Extraocular movements intact.      Pupils: Pupils are equal, round, and reactive to light.   Neck:       Comments: ARMINDA drain in neck   Cardiovascular:      Rate and Rhythm: Normal rate. Rhythm irregular.   Pulmonary:      Effort: Pulmonary effort is normal.      Breath sounds: Normal breath sounds.      Comments: Right sided chest tube draining bloody fluid  Abdominal:      General: Abdomen is flat.      Palpations: Abdomen is soft.      Tenderness: There is abdominal tenderness.      Comments: Midline incision, abdominal binder, appropriately tender   Musculoskeletal:         General: Normal range of motion.      Cervical back: Normal range of motion.   Skin:     General: Skin is warm and dry.   Neurological:      General: No focal deficit present.      Mental Status: She is alert and oriented to person, place, and time.   Psychiatric:         Mood and Affect: Mood normal.         Behavior: Behavior normal.       Vents:       Lines/Drains/Airways       Drain  Duration                  Chest Tube 11/02/22 Right Midaxillary 24 Fr. <1 day         Closed/Suction Drain 11/02/22 1630 Left;Superior Chest Bulb 19 Fr. <1 day         Urethral Catheter 11/02/22 Non-latex 16 Fr. <1 day              Arterial Line  Duration             Arterial Line 11/02/22 0922 Left Radial <1 day              Peripheral Intravenous Line  Duration                  Peripheral IV - Single Lumen 16 G Right Antecubital -- days         Peripheral IV - Single Lumen 11/02/22 0702 18 G Anterior;Distal;Left Wrist <1 day                    Significant Labs:    CBC/Anemia Profile:  Recent Labs   Lab 11/01/22  0929 11/02/22  1518 11/02/22  1603 11/02/22  1636 11/02/22  1746   WBC 6.96  --  5.39  --  7.28   HGB 10.5*  --  8.2*  --  9.5*   HCT 30.5*   < > 24.2* 23* 29.0*   PLT 74*  --  45*  --  51*   *  --  108*  --  106*   RDW 14.4  --  18.8*  --  SEE COMMENT    < > = values in this interval not displayed.        Chemistries:  Recent Labs   Lab 11/01/22  0929 11/02/22  1746   * 136   K 3.8 3.7    108   CO2 23 19*   BUN 11 11   CREATININE  0.8 0.6   CALCIUM 9.8 8.1*   ALBUMIN 3.1* 2.9*   PROT 6.8 4.8*   BILITOT 0.2 1.1*   ALKPHOS 90 52*   ALT 12 26   AST 14 47*   MG  --  1.8   PHOS  --  4.1       All pertinent labs within the past 24 hours have been reviewed.    Significant Imaging: I have reviewed all pertinent imaging results/findings within the past 24 hours.    Assessment/Plan:     Active Diagnoses:    Diagnosis Date Noted POA    PRINCIPAL PROBLEM:  Squamous cell esophageal cancer [C15.9] 06/08/2022 Yes    Calculus of gallbladder without biliary obstruction [K80.20] 11/02/2022 Yes    Thrombocytopenia [D69.6] 08/29/2022 Yes    Atrial fibrillation [I48.91] 08/19/2022 Yes    Moderate malnutrition [E44.0] 06/08/2022 Yes    PUD (peptic ulcer disease) [K27.9] 05/24/2016 Yes      Problems Resolved During this Admission:         Neuro/Psych:   -- Sedation: none   -- Pain: PCA pump             Cards:   -- HDS  -- hx of a fib - on amiodarone at home, if patient in a fib will give metop, and if in RVR will bolus amio and then start a gtt      Pulm:   -- Current smoker 1/2 PPD, COPD  -- Goal O2 sat > 90%      Renal:  -- Keep villegas for strict I/O  -- 100ml/hr maintenance fluids  -- BUN/Cr 11/0.6      FEN / GI:   -- Replace lytes as needed  -- Nutrition: NPO  -- No enteral access      ID:   -- Tm: afebrile; WBC 7.28  -- Abx not indicated      Heme/Onc:   -- H/H stable: 9.5/29.0   -- Platelets 45 intra-op - receiving one unit platelets 11/2  -- Daily CBC      Endo:   -- Gluc goal 140-180  -- No hx DM      PPx:   Feeding: NPO  Analgesia/Sedation: PCA pump / none  Thromboembolic prevention: will start tomorrow   HOB >30: Yes  Stress Ulcer ppx: None  Glucose control: Critical care goal 140-180 g/dl, ISS    Lines/Drains/Airway: Left sided vasiliy drain (smell breath to see if there is any conduit necrosis), right sided chest tube, villegas      Dispo/Code Status/Palliative:   -- SICU / Full Code     Torrie Ruiz MD  Critical Care - Surgery  [facility] (2nd  Fl[facility]

## 2022-11-03 NOTE — ASSESSMENT & PLAN NOTE
Platelets   Date Value Ref Range Status   11/03/2022 49 (L) 150 - 450 K/uL Final       Patient does not have any hepatosplenomegaly  Patient does not have any known liver disease  Patient does have any associated anemia  Patient does not have any severe valvulopathy     Patient is a 60 yo F with previously stated problems for which hematology was consulted for evaluation of thrombocytopenia. Platelet range of 40-70K. Mild petechiae noted in oropharynx, bruising of UE and SS output from drains. Medication review completed.     Consideration for ITP given previous response to IVIG and steroids most likely, nutritional deficiency given macrocytic anemia in setting of esophageal CA with known poor intake, vs marrow infiltrative process given possible metastatic CA less likely . Given the improvement in PLT count with IVIG and steroids will use in case of bleeding or worsening of thrombocytopenia. Lower concern for TTP or MAHA at this time with normal renal function and bilirubin. Anemia stable at this time but macrocytosis noted on labs. RI of 1.1 which can indicate hypoproliferative process which is confounded by chemotherapy, radiation and recent procedures.    Plan:  - Given PLT count of 49K and relatively stable bleeding post operatively, will hold on any intervention at this time  - daily CBC  - would consider using IVIG vs steroids if PLT count does drop below 30K  - if acute bleeding episode would also give platelets and would start IVIG and steroids  - Labs ordered:     - Pathologist review of smear     - HIV 1/2 and hepatitis B and C     - retic, LDH, haptoglobin      - B12, copper and folate       Case discussed with Dr. Lopez with full attestation to follow

## 2022-11-03 NOTE — SUBJECTIVE & OBJECTIVE
Oncology Treatment Plan:   [No matching plan found]    Medications:  Continuous Infusions:   sodium chloride 0.9% 100 mL/hr at 22 0900    amiodarone in dextrose 5% 0.5 mg/min (22 1157)    hydromorphone in 0.9 % NaCl 6 mg/30 ml      Standard [employer] [facility] [employer] ADULT TPN for patient with NO electrolyte abnormality and NO renal dysfunction (CENTRAL)       Scheduled Meds:   enoxaparin  30 mg Subcutaneous Daily    famotidine (PF)  20 mg Intravenous BID    fat emulsion 20%  250 mL Intravenous Daily    levalbuterol  0.63 mg Nebulization Q6H WAKE    nicotine  1 patch Transdermal Daily     PRN Meds:sodium chloride, calcium gluconate IVPB, calcium gluconate IVPB, calcium gluconate IVPB, hydrALAZINE, magnesium sulfate IVPB, magnesium sulfate IVPB, naloxone, ondansetron, potassium chloride **AND** potassium chloride **AND** potassium chloride, sodium chloride 0.9%, sodium chloride 0.9%, sodium phosphate IVPB, sodium phosphate IVPB, sodium phosphate IVPB     Review of patient's allergies indicates:   Allergen Reactions    Shellfish containing products Nausea And Vomiting     All seafood    Topamax [topiramate] Nausea And Vomiting and Other (See Comments)     Vertigo          Past Medical History:   Diagnosis Date    Bicytopenia 2022    Cancer     Chronic back pain     H/O: UGI bleed     Hypertension     PUD (peptic ulcer disease)     Spondylolisthesis at L5-S1 level      Past Surgical History:   Procedure Laterality Date    BREAST BIOPSY Right     Benign    CATARACT EXTRACTION, BILATERAL  2018, 2018    CERVICAL SPINE SURGERY       SECTION      COLONOSCOPY  2019    Dr Case    ENDOSCOPIC NASAL CAUTERIZATION Bilateral 2022    Procedure: CAUTERIZATION, NOSE, ENDOSCOPIC;  Surgeon: Fabiano Cisneros MD;  Location: [facility];  Service: ENT;  Laterality: Bilateral;    ENDOSCOPIC ULTRASOUND OF UPPER GASTROINTESTINAL TRACT Left 2022    Procedure: ULTRASOUND, UPPER GI TRACT, ENDOSCOPIC;   Surgeon: Matt Alonso MD;  Location: [facility] ENDO;  Service: Endoscopy;  Laterality: Left;    ESOPHAGOGASTRODUODENOSCOPY N/A 6/21/2022    Procedure: EGD (ESOPHAGOGASTRODUODENOSCOPY);  Surgeon: Matt Alonso MD;  Location: [facility] ENDO;  Service: Endoscopy;  Laterality: N/A;    HYSTERECTOMY  2011    total    INSERTION OF TUNNELED CENTRAL VENOUS CATHETER (CVC) WITH SUBCUTANEOUS PORT Right 6/23/2022    Procedure: [id number];  Surgeon: Angel Cedeno MD;  Location: [facility];  Service: General;  Laterality: Right;  Right Subclavian    OOPHORECTOMY      PEPTIC ULCER REMOVAL      SPINAL FUSION       Family History       Problem Relation (Age of Onset)    Cancer Maternal Grandmother    Diabetes Father    Heart disease Mother, Father          Tobacco Use    Smoking status: Every Day     Packs/day: 0.50     Types: Cigarettes     Start date: 1977    Smokeless tobacco: Never   Substance and Sexual Activity    Alcohol use: Yes     Comment: occasional    Drug use: No    Sexual activity: Yes     Partners: Male       Review of Systems   Constitutional:  Positive for unexpected weight change. Negative for chills, diaphoresis, fatigue and fever.   HENT:  Negative for nosebleeds and sore throat.    Eyes:  Negative for photophobia, pain and visual disturbance.   Respiratory:  Negative for cough, shortness of breath and wheezing.    Cardiovascular:  Negative for chest pain and palpitations.   Gastrointestinal:  Negative for abdominal distention, abdominal pain, blood in stool, diarrhea, nausea and vomiting.   Genitourinary:  Negative for dysuria, flank pain, frequency, hematuria and urgency.   Musculoskeletal:  Negative for arthralgias, myalgias and neck pain.   Skin:  Negative for pallor and rash.   Neurological:  Negative for dizziness, syncope and headaches.   Hematological:  Bruises/bleeds easily.   Psychiatric/Behavioral:  Negative for behavioral problems, confusion, hallucinations and suicidal ideas.    All  other systems reviewed and are negative.  Objective:     Vital Signs (Most Recent):  Temp: 99.5 °F (37.5 °C) (11/03/22 0900)  Pulse: 91 (11/03/22 1445)  Resp: (!) 21 (11/03/22 1445)  BP: 115/62 (11/03/22 1445)  SpO2: (!) 90 % (11/03/22 1445)   Vital Signs (24h Range):  Temp:  [98 °F (36.7 °C)-99.5 °F (37.5 °C)] 99.5 °F (37.5 °C)  Pulse:  [59-93] 91  Resp:  [10-24] 21  SpO2:  [90 %-100 %] 90 %  BP: (105-169)/(55-96) 115/62  Arterial Line BP: (129-168)/(56-69) 140/63     Weight: 47.6 kg (104 lb 15 oz)  Body mass index is 18.01 kg/m².  Body surface area is 1.47 meters squared.      Intake/Output Summary (Last 24 hours) at 11/3/2022 1508  Last data filed at 11/3/2022 1302  Gross per 24 hour   Intake 2962.2 ml   Output 1698 ml   Net 1264.2 ml       Physical Exam  Vitals and nursing note reviewed.   Constitutional:       General: She is not in acute distress.     Appearance: Normal appearance. She is not toxic-appearing.   HENT:      Mouth/Throat:      Comments: Neck drain is bloody   Petechiae noted   Eyes:      Extraocular Movements: Extraocular movements intact.      Pupils: Pupils are equal, round, and reactive to light.   Cardiovascular:      Rate and Rhythm: Normal rate and regular rhythm.      Pulses: Normal pulses.      Heart sounds: No murmur heard.  Pulmonary:      Effort: Pulmonary effort is normal.      Breath sounds: Wheezing present.      Comments: Right chest tube with blood on bandage  Incisions c/d/i      Abdominal:      General: There is no distension.      Palpations: Abdomen is soft.      Comments:      Musculoskeletal:         General: No swelling.      Right lower leg: No edema.      Left lower leg: No edema.   Skin:     General: Skin is warm.      Capillary Refill: Capillary refill takes less than 2 seconds.      Coloration: Skin is not jaundiced.      Findings: Bruising (UE noted) present. No erythema or rash.   Neurological:      General: No focal deficit present.      Mental Status: She is alert.        Significant Labs:   CBC:   Recent Labs   Lab 11/02/22 1746 11/03/22 0342 11/03/22  1325   WBC 7.28 10.68 14.35*   HGB 9.5* 8.2* 8.3*   HCT 29.0* 24.0* 25.0*   PLT 51* 49* 58*   , CMP:   Recent Labs   Lab 11/02/22 1746 11/03/22 0342    133*   K 3.7 4.3    107   CO2 19* 20*   * 124*   BUN 11 16   CREATININE 0.6 0.7   CALCIUM 8.1* 8.2*   PROT 4.8* 5.3*   ALBUMIN 2.9* 2.9*   BILITOT 1.1* 0.7   ALKPHOS 52* 56   AST 47* 40   ALT 26 25   ANIONGAP 9 6*   , Coagulation:   Recent Labs   Lab 11/02/22 1746   INR 1.1   APTT 37.0*   , Haptoglobin: No results for input(s): HAPTOGLOBIN in the last 48 hours., LDH: No results for input(s): LDHCSF, BFSOURCE in the last 48 hours., Reticulocytes:   Recent Labs   Lab 11/03/22  1325   RETIC 1.9   , and All pertinent labs from the last 24 hours have been reviewed.    Diagnostic Results:  I have reviewed all pertinent imaging results/findings within the past 24 hours.

## 2022-11-03 NOTE — SUBJECTIVE & OBJECTIVE
Interval History/Significant Events: No acute events overnight. Patient received 1 unit platelets yesterday.     Follow-up For: Procedure(s) (LRB):  XI ROBOTIC ESOPHAGECTOMY Converted to open at 1215  (N/A)  LYSIS, ADHESIONS  VATS, WITH WEDGE RESECTION, LUNG (Right)  CHOLECYSTECTOMY  ESOPHAGECTOMY    Post-Operative Day: 1 Day Post-Op    Objective:     Vital Signs (Most Recent):  Temp: 98.3 °F (36.8 °C) (11/03/22 0305)  Pulse: 76 (11/03/22 0500)  Resp: 18 (11/03/22 0500)  BP: (!) 115/56 (11/03/22 0400)  SpO2: 95 % (11/03/22 0500)   Vital Signs (24h Range):  Temp:  [97.7 °F (36.5 °C)-98.3 °F (36.8 °C)] 98.3 °F (36.8 °C)  Pulse:  [59-79] 76  Resp:  [11-22] 18  SpO2:  [95 %-100 %] 95 %  BP: (110-169)/(55-77) 115/56  Arterial Line BP: (129-168)/(56-69) 143/61     Weight: 47.6 kg (104 lb 15 oz)  Body mass index is 18.01 kg/m².      Intake/Output Summary (Last 24 hours) at 11/3/2022 0545  Last data filed at 11/3/2022 0500  Gross per 24 hour   Intake 6362.2 ml   Output 1615 ml   Net 4747.2 ml     Physical Exam  HENT:      Head: Normocephalic and atraumatic.      Nose: Nose normal.   Eyes:      Extraocular Movements: Extraocular movements intact.      Pupils: Pupils are equal, round, and reactive to light.   Neck:      Comments: ARMINDA drain in neck   Cardiovascular:      Rate and Rhythm: Normal rate. Rhythm irregular.   Pulmonary:      Effort: Pulmonary effort is normal.      Breath sounds: Normal breath sounds.      Comments: Right sided chest tube draining bloody fluid  Abdominal:      General: Abdomen is flat.      Palpations: Abdomen is soft.      Tenderness: There is abdominal tenderness.      Comments: Midline incision, abdominal binder, appropriately tender   Musculoskeletal:         General: Normal range of motion.      Cervical back: Normal range of motion.   Skin:     General: Skin is warm and dry.   Neurological:      General: No focal deficit present.      Mental Status: She is alert and oriented to person, place,  and time.   Psychiatric:         Mood and Affect: Mood normal.         Behavior: Behavior normal.     Vents:       Lines/Drains/Airways       Drain  Duration                  Chest Tube 11/02/22 Right Midaxillary 24 Fr. 1 day         Urethral Catheter 11/02/22 Non-latex 16 Fr. 1 day         Closed/Suction Drain 11/02/22 1630 Left;Superior Chest Bulb 19 Fr. <1 day              Arterial Line  Duration             Arterial Line 11/02/22 0922 Left Radial <1 day              Peripheral Intravenous Line  Duration                  Peripheral IV - Single Lumen 16 G Right Antecubital -- days         Peripheral IV - Single Lumen 11/02/22 0702 18 G Anterior;Distal;Left Wrist <1 day                    Significant Labs:    CBC/Anemia Profile:  Recent Labs   Lab 11/02/22  1603 11/02/22  1636 11/02/22  1746 11/03/22  0342   WBC 5.39  --  7.28 10.68   HGB 8.2*  --  9.5* 8.2*   HCT 24.2* 23* 29.0* 24.0*   PLT 45*  --  51* 49*   *  --  106* 102*   RDW 18.8*  --  SEE COMMENT SEE COMMENT        Chemistries:  Recent Labs   Lab 11/01/22  0929 11/02/22  1746 11/03/22  0342   * 136 133*   K 3.8 3.7 4.3    108 107   CO2 23 19* 20*   BUN 11 11 16   CREATININE 0.8 0.6 0.7   CALCIUM 9.8 8.1* 8.2*   ALBUMIN 3.1* 2.9* 2.9*   PROT 6.8 4.8* 5.3*   BILITOT 0.2 1.1* 0.7   ALKPHOS 90 52* 56   ALT 12 26 25   AST 14 47* 40   MG  --  1.8 2.2   PHOS  --  4.1 3.1       All pertinent labs within the past 24 hours have been reviewed.    Significant Imaging:  I have reviewed all pertinent imaging results/findings within the past 24 hours.

## 2022-11-03 NOTE — HPI
Mrs. Camara is a 61 F with SCC of the distal esophagus (initial stage II) s/p NACR with carbo/taxol, hx of UGIB and Afib who presents for admission for planned robotic esophagectomy. Patient is followed by Dr. Schafer, Dr. Urbano and Dr. Garcia in [facility]. Patient was initially evaluated by PCP then oncology for dysphagia and weight loss. 4.5 cm esophageal mass starting at 31cm from incisiors. Pathology revealed poorly differentiated SCC. Staging CT found questionable 1.3cm liver lesion and 3 small pulm nodules. Post chemotherapy course was complicated by persistent anemia and thrombocytopenia. Macrocytic anemia with range of 8-9 and has received transfusions on previous admissions. Thrombocytopenia more severe with count as low as 2K shortly after chemotherapy completed but range of 40-70K. During admission from 8/29 to 9/1 patient was given IVIG 2x and Dex 40 mg for 4 days with some improvement from 7K to 23K.     Patient s/p esophagectomy (robotic-open), VATS now admitted to SICU for closer monitoring, with chest tube and ARMINDA drain in place. Serosanguinous output noted Patient reported after last discharge being sent home with prednisone which was las taken on 10/2/22. Patient reports mild bruising of upper extremities, but no large bruises or rashes noted. Patient reports 1 episode of epistaxis since discharge which was self limited. Patient denies any recent GIB or hematuria or other episodes or blood loss. Patient denies any other complaints including fever or other constitutional symptoms. Hematology was consulted for evaluation of thrombocytopenia in setting of persistent bicytopenia.

## 2022-11-03 NOTE — HOSPITAL COURSE
62yo F with PMH HTN, PUD, current smoker, perforated gastric ulcer s/p Richard patch, and SCC of the esophagus now s/p robotic assisted esophagectomy with open abdominal portion on 11/2. Patient does not have enteral access. Robotic assisted esophagectomy with open abdominal portion on 11/2 - patient admitted directly to SICU after OR. Course complicated by patient being in a fib with RVR. Patient started on amio gtt. There were pauses in ECG sustained. Monitor reflecting rate 28. Amiodarone stopped evening of 11/5. Multi-function pads placed. Propofol paused, then restarted at lower dose. ECG rate recovered to junctional rhythm, rate 46. Cardiology at bedside evaluating patient and telemetry. Patient started on esmolol gtt. Esmolol gtt stopped due to hypotension and patient returning to NSR. Presumed diagnosis of ITP, platelets of 5 11/8, IVIG started. Briefly on levo 0.04 for a few hours overnight 11/7.

## 2022-11-03 NOTE — ANESTHESIA POSTPROCEDURE EVALUATION
Anesthesia Post Evaluation    Patient: Angelita Camara    Procedure(s) Performed: Procedure(s) (LRB):  XI ROBOTIC ESOPHAGECTOMY Converted to open at 1215  (N/A)  LYSIS, ADHESIONS  VATS, WITH WEDGE RESECTION, LUNG (Right)  CHOLECYSTECTOMY  ESOPHAGECTOMY    Final Anesthesia Type: general      Patient location during evaluation: PACU  Patient participation: Yes- Able to Participate  Level of consciousness: awake and alert  Post-procedure vital signs: reviewed and stable  Pain management: adequate  Airway patency: patent    PONV status at discharge: No PONV  Anesthetic complications: no      Cardiovascular status: hemodynamically stable  Respiratory status: unassisted and spontaneous ventilation  Hydration status: euvolemic  Follow-up not needed.          Vitals Value Taken Time   /61 11/03/22 1533   Temp 37.5 °C (99.5 °F) 11/03/22 0900   Pulse 92 11/03/22 1544   Resp 18 11/03/22 1544   SpO2 91 % 11/03/22 1544   Vitals shown include unvalidated device data.      Event Time   Out of Recovery 18:30:00         Pain/Lamonte Score: Pain Rating Prior to Med Admin: 5 (11/3/2022  7:24 AM)  Pain Rating Post Med Admin: 4 (11/2/2022  6:45 PM)  Lamonte Score: 9 (11/2/2022  7:00 PM)

## 2022-11-03 NOTE — PROGRESS NOTES
[facility] - [facility] (2nd Fl)  General Surgery  Progress Note    Subjective:     History of Present Illness:  No notes on file    Post-Op Info:  Procedure(s) (LRB):  XI ROBOTIC ESOPHAGECTOMY Converted to open at 1215  (N/A)  LYSIS, ADHESIONS  VATS, WITH WEDGE RESECTION, LUNG (Right)  CHOLECYSTECTOMY  ESOPHAGECTOMY   1 Day Post-Op     Interval History:   Did well overnight, HDS  Plts were 45 post-op; received 1 pack plts and increased to 51 only  Thrombocytopenia in the past as low as 2 attributed to chemo, but ITP is on differential  Drains are bloody  Starting TPN tonight through her port    Medications:  Continuous Infusions:   sodium chloride 0.9% 100 mL/hr at 11/03/22 0413    hydromorphone in 0.9 % NaCl 6 mg/30 ml       Scheduled Meds:   enoxaparin  30 mg Subcutaneous Daily    famotidine (PF)  20 mg Intravenous BID    levalbuterol  0.63 mg Nebulization Q6H WAKE    nicotine  1 patch Transdermal Daily     PRN Meds:sodium chloride, calcium gluconate IVPB, calcium gluconate IVPB, calcium gluconate IVPB, ceFAZolin (ANCEF) IVPB, enoxaparin, hydrALAZINE, magnesium sulfate IVPB, magnesium sulfate IVPB, naloxone, ondansetron, potassium chloride **AND** potassium chloride **AND** potassium chloride, sodium chloride 0.9%, sodium chloride 0.9%, sodium phosphate IVPB, sodium phosphate IVPB, sodium phosphate IVPB     Review of patient's allergies indicates:   Allergen Reactions    Shellfish containing products Nausea And Vomiting     All seafood    Topamax [topiramate] Nausea And Vomiting and Other (See Comments)     Vertigo       Objective:     Vital Signs (Most Recent):  Temp: 98.3 °F (36.8 °C) (11/03/22 0305)  Pulse: 78 (11/03/22 0730)  Resp: 12 (11/03/22 0730)  BP: (!) 137/58 (11/03/22 0600)  SpO2: 100 % (11/03/22 0730)   Vital Signs (24h Range):  Temp:  [98 °F (36.7 °C)-98.3 °F (36.8 °C)] 98.3 °F (36.8 °C)  Pulse:  [59-83] 78  Resp:  [10-22] 12  SpO2:  [95 %-100 %] 100 %  BP: (113-169)/(55-77) 137/58  Arterial Line  BP: (129-168)/(56-69) 147/64     Weight: 47.6 kg (104 lb 15 oz)  Body mass index is 18.01 kg/m².    Intake/Output - Last 3 Shifts         11/01 0700 11/02 0659 11/02 0700 11/03 0659 11/03 0700 11/04 0659    I.V. (mL/kg)  2078.5 (43.7)     Blood  685     IV Piggyback  3598.7     Total Intake(mL/kg)  6362.2 (133.7)     Urine (mL/kg/hr)  900 (0.8)     Drains  25     Blood  250     Chest Tube  500     Total Output  1675     Net  +4687.2                    Physical Exam  Vitals and nursing note reviewed.   Constitutional:       Appearance: Normal appearance.   HENT:      Mouth/Throat:      Comments: Left neck incision is closed  Neck drain is bloody without swelling    Cardiovascular:      Rate and Rhythm: Normal rate and regular rhythm.      Pulses: Normal pulses.   Pulmonary:      Effort: Pulmonary effort is normal.      Comments: Right chest tube without air leak with SS drainage  Blood on the dressing  Incisions are clean, dry and intact      Abdominal:      Palpations: Abdomen is soft.      Comments: Midline incision is clean, dry and intact     Musculoskeletal:         General: No swelling.   Skin:     General: Skin is warm.      Capillary Refill: Capillary refill takes less than 2 seconds.   Neurological:      General: No focal deficit present.      Mental Status: She is alert.       Significant Labs:  I have reviewed all pertinent lab results within the past 24 hours.  CBC:   Recent Labs   Lab 11/03/22  0342   WBC 10.68   RBC 2.35*   HGB 8.2*   HCT 24.0*   PLT 49*   *   MCH 34.9*   MCHC 34.2     CMP:   Recent Labs   Lab 11/03/22  0342   *   CALCIUM 8.2*   ALBUMIN 2.9*   PROT 5.3*   *   K 4.3   CO2 20*      BUN 16   CREATININE 0.7   ALKPHOS 56   ALT 25   AST 40   BILITOT 0.7     Coagulation:   Recent Labs   Lab 11/02/22  1746   LABPROT 11.2   INR 1.1   APTT 37.0*       Significant Diagnostics:  I have reviewed all pertinent imaging results/findings within the past 24  hours.    Assessment/Plan:     H/O esophagectomy  61F with history of thrombocytopenia, Afib, smoker s/p Tyrone esophagectomy on 11/2. Had previous gastric perforation complicated by takeback for abdominal abscess and gastroepiploic pedicle was not robust. However, conduit appeared well perfused. Had biopsy of concerning right pleural based lung lesion was negative for malignancy. No Jtube was placed due to adhesions.    NPO (occasional mouth swabs are OK)  Consult to heme-onc to evaluate for immune thrombocytopenia so we have a contingency plan (she did not respond to platelet transfusion last night)  Discontinue sin and nelly this AM  Start TPN (no Jtube)  Start IV amiodarone (was on PO at home but only for one month so needs to continue on with IV)  Keep chest tube to suction        MEAGHAN Polanco MD  General Surgery  [facility] - Surgery (2nd Fl)

## 2022-11-04 PROBLEM — R57.9 SHOCK: Status: RESOLVED | Noted: 2022-01-01 | Resolved: 2022-01-01

## 2022-11-04 PROBLEM — R57.9 SHOCK: Status: ACTIVE | Noted: 2022-01-01

## 2022-11-04 PROBLEM — I49.01 VENTRICULAR FIBRILLATION: Status: ACTIVE | Noted: 2022-01-01

## 2022-11-04 PROBLEM — I48.0 PAROXYSMAL ATRIAL FIBRILLATION: Status: ACTIVE | Noted: 2022-01-01

## 2022-11-04 PROBLEM — J96.01 ACUTE HYPOXEMIC RESPIRATORY FAILURE: Status: ACTIVE | Noted: 2022-01-01

## 2022-11-04 NOTE — PROGRESS NOTES
[facility] - Surgical Intensive Care  Critical Care - Surgery  Progress Note    Patient Name: Angelita Camara  MRN: [id number]  Admission Date: 11/2/2022  Hospital Length of Stay: 2 days  Code Status: Full Code  Attending Provider: Melo Schafer MD  Primary Care Provider: Yaakov Dan II, MD   Principal Problem: Squamous cell esophageal cancer    Subjective:     Hospital/ICU Course:  60yo F with PMH HTN, PUD, current smoker, perforated gastric ulcer s/p Richard patch, and SCC of the esophagus now s/p robotic assisted esophagectomy with open abdominal portion on 11/2. Patient does not have enteral access. Robotic assisted esophagectomy with open abdominal portion on 11/2 - patient admitted directly to SICU after OR.       Interval History/Significant Events: See Significant event note for full details. In summary, patient transferred from PACU to SICU requiring additional oxygen support. Began decompensating with bradycardia, hypotension, and hypoxia and was subsequently intubated and placed on levo for 1 hour until sufficient resuscitation was achieved. Patient currently HDS, intubated, off pressors.     Follow-up For: Procedure(s) (LRB):  XI ROBOTIC ESOPHAGECTOMY Converted to open at 1215  (N/A)  LYSIS, ADHESIONS  VATS, WITH WEDGE RESECTION, LUNG (Right)  CHOLECYSTECTOMY  ESOPHAGECTOMY    Post-Operative Day: 2 Days Post-Op    Objective:     Vital Signs (Most Recent):  Temp: 98.4 °F (36.9 °C) (11/04/22 0300)  Pulse: 73 (11/04/22 0422)  Resp: (!) 25 (11/04/22 0454)  BP: 119/67 (11/04/22 0422)  SpO2: (!) 94 % (11/04/22 0422)   Vital Signs (24h Range):  Temp:  [98.2 °F (36.8 °C)-99.5 °F (37.5 °C)] 98.4 °F (36.9 °C)  Pulse:  [] 73  Resp:  [5-36] 25  SpO2:  [79 %-100 %] 94 %  BP: ()/(48-96) 119/67  Arterial Line BP: ()/(34-64) 136/51     Weight: 47.6 kg (104 lb 15 oz)  Body mass index is 18.01 kg/m².      Intake/Output Summary (Last 24 hours) at 11/4/2022 0524  Last data filed at 11/4/2022 0400  Gross per 24  hour   Intake 5251.92 ml   Output 2248 ml   Net 3003.92 ml       Physical Exam  Vitals reviewed.   Constitutional:       Appearance: She is ill-appearing.   HENT:      Mouth/Throat:      Comments: ETT in place 7/100  Neck:      Comments: Left neck incision CDI  ARMINDA drain with minimal fluid.  Cardiovascular:      Rate and Rhythm: Normal rate and regular rhythm.      Pulses: Normal pulses.      Heart sounds: Normal heart sounds.   Pulmonary:      Effort: Pulmonary effort is normal.      Breath sounds: Normal breath sounds.   Chest:      Comments: Right tunneled portacath  CT in place on suction with SS output.  Abdominal:      General: Abdomen is flat. Bowel sounds are normal.      Palpations: Abdomen is soft.      Comments: Incisions CDI   Genitourinary:     Comments: Gregory in place  Skin:     General: Skin is warm.      Capillary Refill: Capillary refill takes less than 2 seconds.       Vents:  Vent Mode: A/C (11/04/22 0422)  Set Rate: 20 BPM (11/04/22 0422)  Vt Set: 450 mL (11/04/22 0422)  PEEP/CPAP: 7 cmH20 (11/04/22 0422)  Oxygen Concentration (%): 70 (11/04/22 0300)    Lines/Drains/Airways       Central Venous Catheter Line  Duration             Port A Cath Single Lumen 11/03/22 2300 right subclavian <1 day              Drain  Duration                  Chest Tube 11/02/22 Right Midaxillary 24 Fr. 2 days         Closed/Suction Drain 11/02/22 1630 Left;Superior Chest Bulb 19 Fr. 1 day         Urethral Catheter 11/03/22 2208 Straight-tip 16 Fr. <1 day              Airway  Duration                  Airway - Non-Surgical 11/04/22 0209 Endotracheal Tube <1 day       Airway Anesthesia 11/04/22 <1 day              Arterial Line  Duration             Arterial Line 11/04/22 0237 Left Radial <1 day              Peripheral Intravenous Line  Duration                  Peripheral IV - Single Lumen 11/03/22 2209 20 G Left Antecubital <1 day         Peripheral IV - Single Lumen 11/04/22 0240 20 G Left Forearm <1 day          Peripheral IV - Single Lumen 11/04/22 0240 20 G Left Wrist <1 day         Peripheral IV - Single Lumen 11/04/22 0240 20 G Right Forearm <1 day                    Significant Labs:    CBC/Anemia Profile:  Recent Labs   Lab 11/03/22  0342 11/03/22  1325 11/03/22  2223 11/03/22  2234 11/04/22  0148 11/04/22  0305   WBC 10.68 14.35* 14.30*  --   --   --    HGB 8.2* 8.3* 8.5*  --   --   --    HCT 24.0* 25.0* 24.8* 26* 20* 21*   PLT 49* 58* 53*  --   --   --    * 106* 103*  --   --   --    RDW SEE COMMENT SEE COMMENT SEE COMMENT  --   --   --    RETIC  --  1.9  --   --   --   --    FOLATE  --  9.2  --   --   --   --    MAJNDCES10  --  >2000*  --   --   --   --         Chemistries:  Recent Labs   Lab 11/02/22  1746 11/03/22  0342 11/03/22  2223 11/04/22  0446    133* 137 136   K 3.7 4.3 3.7 4.1    107 108 108   CO2 19* 20* 18* 20*   BUN 11 16 16 15   CREATININE 0.6 0.7 0.6 0.7   CALCIUM 8.1* 8.2* 8.5* 8.0*   ALBUMIN 2.9* 2.9* 2.8* 3.1*   PROT 4.8* 5.3* 5.7* 5.1*   BILITOT 1.1* 0.7 0.5 0.5   ALKPHOS 52* 56 64 48*   ALT 26 25 21 15   AST 47* 40 42* 35   MG 1.8 2.2 1.7 2.1   PHOS 4.1 3.1  --  1.8*       Significant Imaging:  I have reviewed all pertinent imaging results/findings within the past 24 hours.    Assessment/Plan:     Atrial fibrillation    Neuro/Psych:   -- Sedation: 50 propofol   -- Pain: Dilaudid PRN / Fentanyl              Cards:   -- HDS  -- BNP 1147  -- Cards consulted for echo given low CI on flow track. Bedside echo demonstrated adequate contraction of all chambers without major abnormalities. Suspicion for cardiogenic pathology is low.   #A.fib  -- a.fib with RVR overnight: given metoprolol bolus without change - given bolus of amio however patient became hypotensive. Stopped amio gtt. Currently not running.       Pulm:   -- Vent A/C 7/100 - satting 90-93.  -- Wean PEEP as possible   -- Current smoker 1/2 PPD, COPD  -- Nicotine patch  -- Goal O2 sat > 90%  #CXR  -- Worsening bilateral  pattern of opacity - SIRS vs. edema      Renal:  -- Villegas taken out yesterday however patient did not void all day, reinserted in the evening with initial output of 1L.   -- Stop mIV  -- BUN/Cr 15/0.7      FEN / GI:   -- Replace lytes as needed  -- Nutrition: NPO  -- Nutrition consult placed, rec's appreciated  -- TPN restarted last night however paused due to pressor requirements.       ID:   -- Tm: afebrile; WBC 14.3  -- Abx none      Heme/Onc:   -- H/H stable 8.5/24.8  -- Daily CBC  #Thrombocytopenia  -- Received 1 unit of platelet 11/2.  -- Heme/Onc consulted for possible ITP.     Endo:   -- Gluc goal 140-180  -- No hx DM      PPx:   Feeding: NPO  Analgesia/Sedation: See above  Thromboembolic prevention: lovenox   HOB >30: Yes  Stress Ulcer ppx: famotidine  Glucose control: Critical care goal 140-180 g/dl    Lines/Drains/Airway: Right CT, ETT, villegas, right radial a-line, neck ARMINDA.       Dispo/Code Status/Palliative:   -- SICU / Full Code    Jessica Rivas MD  Critical Care - Surgery  [facility] - [facility]

## 2022-11-04 NOTE — CONSULTS
"  [facility] - Surgical Intensive Care  Adult Nutrition  Consult Note    SUMMARY     Recommendations    1) Increase TPN: 190 D + 60 AA   -Provides 889 kcals, 60g Protein (2.79)   -As propofol weaned, add IV Lipids     2) RD following    Goals: Meet % EEN by next RD f/u  Nutrition Goal Status: new  Communication of RD Recs: other (comment) (POC)    Assessment and Plan    Nutrition Problem  Inadequate energy intake     Related to (etiology):   Inability to consumer sufficient energy     Signs and Symptoms (as evidenced by):   NPO, Low BMI    Interventions/Recommendations (treatment strategy):  Collaboration with other providers     Nutrition Diagnosis Status:   New     Reason for Assessment    Reason For Assessment: consult, new TPN  Diagnosis: cancer diagnosis/related complications, surgery/postoperative complications  Relevant Medical History: HTN, PUD, current smoker, perforated gastric ulcer  Interdisciplinary Rounds: did not attend  General Information Comments: Pt seen for consult, unable to see pt due to procedure. Pt doesn't have enteral access at this time. Intubated and sedated. Noted w/ low BMI and 7.1% wt loss in 1 month (significant) RD suspects malnutrition. Unable to complete NFPE at this time. Will follow.  Nutrition Discharge Planning: adeqaute intake    Nutrition Risk Screen    Nutrition Risk Screen: tube feeding or parenteral nutrition    Nutrition/Diet History    Spiritual, Cultural Beliefs, Confucianism Practices, Values that Affect Care: no  Food Allergies: shellfish    Anthropometrics    Temp: 98.7 °F (37.1 °C)  Height Method: Stated  Height: 5' 4" (162.6 cm)  Height (inches): 64 in  Weight Method: Standard Scale  Weight: 47.2 kg (104 lb)  Weight (lb): 104 lb  Ideal Body Weight (IBW), Female: 120 lb  % Ideal Body Weight, Female (lb): 86.67 %  BMI (Calculated): 17.8  BMI Grade: 17 - 18.4 protein-energy malnutrition grade I  Weight Loss: unintentional       Lab/Procedures/Meds    Pertinent Labs " Reviewed: reviewed  Pertinent Labs Comments: H/H:6.2/18.8, MCH:34.4, Glu:120, rosi:8.0, phophorus:1.8, alk phos:48  Pertinent Medications Reviewed: reviewed  Pertinent Medications Comments: enoxaparin, famotidine, propofol    Physical Findings/Assessment         Estimated/Assessed Needs    Weight Used For Calorie Calculations: 47.2 kg (104 lb)  Energy Calorie Requirements (kcal): 1349  Energy Need Method: [facility]  Protein Requirements: 56-66 (1.2-1.4 g/kg)  Weight Used For Protein Calculations: 47.2 kg (104 lb)        RDA Method (mL): 1349         Nutrition Prescription Ordered    Current Diet Order: NPO  Current Nutrition Support Formula Ordered: Other (Comment) (custome TPN)    Evaluation of Received Nutrient/Fluid Intake    Parenteral Calories (kcal): 513  Parenteral Protein (gm): 200  Lipid Calories (kcals): 377 kcals (propofol (14.3))  % Kcal Needs: 38  % Protein Needs: 89  I/O: +678.8  Comments: LBM 11/1  % Intake of Estimated Energy Needs: 50 - 75 %  % Meal Intake: NPO    Nutrition Risk    Level of Risk/Frequency of Follow-up: low - moderate       Monitor and Evaluation    Food and Nutrient Intake: parenteral nutrition intake  Food and Nutrient Adminstration: enteral and parenteral nutrition administration  Knowledge/Beliefs/Attitudes: food and nutrition knowledge/skill, beliefs and attitudes  Physical Activity and Function: nutrition-related ADLs and IADLs  Anthropometric Measurements: height/length, weight, weight change, body mass index  Biochemical Data, Medical Tests and Procedures: electrolyte and renal panel, gastrointestinal profile, glucose/endocrine profile, inflammatory profile, lipid profile  Nutrition-Focused Physical Findings: overall appearance       Nutrition Follow-Up    RD Follow-up?: Yes

## 2022-11-04 NOTE — PROGRESS NOTES
[facility] - [facility] Int[facility]  General Surgery  Progress Note    Subjective:     History of Present Illness:  No notes on file    Post-Op Info:  Procedure(s) (LRB):  XI ROBOTIC ESOPHAGECTOMY Converted to open at 1215  (N/A)  LYSIS, ADHESIONS  VATS, WITH WEDGE RESECTION, LUNG (Right)  CHOLECYSTECTOMY  ESOPHAGECTOMY   2 Days Post-Op     Interval History:   Multiple issues overnight including urinary retention with villegas placement, hypoxic respiratory failure secondary to fluid overload now intubated, hypotension requiring short period of vasopressors, metabolic acidosis, a fib with RVR, and concern for heart failure with cardiology consulted.     These have all improved dramatically with resuscitation. This morning she is no longer on vasopressors and her vent settings are decreased. She is now in sinus rhythm as well and her CI has improved.    Labs this morning notable for H/H of 6.4/18.7 and plts are pending. CMP without evidence of renal failure or liver injury. BNP is 1147. CXR consistent with fluid overload.     Medications:  Continuous Infusions:   sodium chloride 0.9% 100 mL/hr at 11/04/22 0600    amiodarone in dextrose 5% Stopped (11/04/22 0200)    NORepinephrine bitartrate-D5W Stopped (11/04/22 0445)    propofoL 50 mcg/kg/min (11/04/22 0600)    Standard Custom Day One ADULT TPN for patient with NO electrolyte abnormality and NO renal dysfunction (CENTRAL) Stopped (11/04/22 0200)     Scheduled Meds:   amiodarone in dextrose  150 mg Intravenous Once    enoxaparin  30 mg Subcutaneous Daily    etomidate        famotidine (PF)  20 mg Intravenous BID    levalbuterol  0.63 mg Nebulization Q6H WAKE    nicotine  1 patch Transdermal Daily    phenylephrine HCl in 0.9% NaCl        sodium phosphate IVPB  20.01 mmol Intravenous Once    succinylcholine         PRN Meds:sodium chloride, calcium gluconate IVPB, calcium gluconate IVPB, calcium gluconate IVPB, hydrALAZINE, HYDROmorphone, magnesium sulfate  IVPB, magnesium sulfate IVPB, naloxone, ondansetron, potassium chloride **AND** potassium chloride **AND** potassium chloride, sodium chloride 0.9%, sodium chloride 0.9%, sodium phosphate IVPB, sodium phosphate IVPB, sodium phosphate IVPB     Review of patient's allergies indicates:   Allergen Reactions    Shellfish containing products Nausea And Vomiting     All seafood    Topamax [topiramate] Nausea And Vomiting and Other (See Comments)     Vertigo       Objective:     Vital Signs (Most Recent):  Temp: 98.4 °F (36.9 °C) (11/04/22 0300)  Pulse: 62 (11/04/22 0700)  Resp: 20 (11/04/22 0700)  BP: (!) 94/55 (11/04/22 0600)  SpO2: 99 % (11/04/22 0700)   Vital Signs (24h Range):  Temp:  [98.2 °F (36.8 °C)-99.5 °F (37.5 °C)] 98.4 °F (36.9 °C)  Pulse:  [] 62  Resp:  [5-36] 20  SpO2:  [79 %-100 %] 99 %  BP: ()/(48-96) 94/55  Arterial Line BP: ()/(34-64) 133/48     Weight: 47.6 kg (104 lb 15 oz)  Body mass index is 18.01 kg/m².    Intake/Output - Last 3 Shifts         11/02 0700 11/03 0659 11/03 0700 11/04 0659 11/04 0700 11/05 0659    I.V. (mL/kg) 2078.5 (43.7) 3721 (78.2)     Blood 685 500     IV Piggyback 3598.7 1316.4     TPN  179.6     Total Intake(mL/kg) 6362.2 (133.7) 5717 (120.1)     Urine (mL/kg/hr) 900 (0.8) 1775 (1.6)     Drains 25 28     Blood 250      Chest Tube 500 540     Total Output 1675 2343     Net +4687.2 +3374                    Physical Exam    Significant Labs:  I have reviewed all pertinent lab results within the past 24 hours.  CBC:   Recent Labs   Lab 11/03/22  2223 11/03/22 2234 11/04/22 0446   WBC 14.30*  --  11.45   RBC 2.40*  --  1.80*   HGB 8.5*  --  6.4*   HCT 24.8*   < > 18.7*   PLT 53*  --   --    *  --  104*   MCH 35.4*  --  35.6*   MCHC 34.3  --  34.2    < > = values in this interval not displayed.     CMP:   Recent Labs   Lab 11/04/22 0446   *   CALCIUM 8.0*   ALBUMIN 3.1*   PROT 5.1*      K 4.1   CO2 20*      BUN 15   CREATININE 0.7    ALKPHOS 48*   ALT 15   AST 35   BILITOT 0.5     Coagulation:   Recent Labs   Lab 11/02/22  1746 11/04/22  0446   LABPROT 11.2 11.9   INR 1.1 1.2   APTT 37.0*  --      Cardiac markers: No results for input(s): CKMB, CPKMB, TROPONINT, TROPONINI, MYOGLOBIN in the last 168 hours.  ABGs:   Recent Labs   Lab 11/04/22  0428   PH 7.302*   PCO2 47.2*   PO2 35*   HCO3 23.3*   POCSATURATED 61*   BE -3       Significant Diagnostics:  I have reviewed all pertinent imaging results/findings within the past 24 hours.         Assessment/Plan:     * Squamous cell esophageal cancer  Angelita Camara is a 61 y.o. female with squamous cell esophageal cancer s/p Tyrone esophagectomy on 11/2/22    - Neuro:   - PRN IV pain meds. Transition to PO once extubated   - Propofol  - CV   - Cncern for heart failure given elevated BNP with low CI at night.   - Cardiology evaluated at bedside with echo and felt she had a hyperdynamic LV.    - Formal ECHO today   - Restart amio for hx of a fib.    - Avoid vasopressors as this can compromise the gastric conduit and anastomoses   - Respit:    - Wean vent settings as able. Ideally would get her extubated today, but given pulm edema will be cautious with this.    - If she has increasing requirements, would go up on FiO2 before going up on PEEP  - Renal:    - Keep villegas for strict I/O and urinary retention yesterday.    - BUN/Cr WNL today  - FEN:   - She is net positive 3.7L overnight. Hemodynamics have improved.    - Will consider diuresis later day   - Replace lytes PRN. Needs phos   - NPO   - TPN  - GI:    - s/p esophagectomy   - Cervical ARMINDA bulb to suction   - GI ppx.  - Endo:    - No know hx of DM   - q6 POCT glucose  - Heme:    - Hgb 6.4 from 8.5 last night. May be partially dilutional.    - Recheck CBC. Okay to transfuse pRBC   - Hematology consulted for possible ITP. Plts today are pending   - DVT ppx  - ID:    - No evidence of infectious process. Hold off on abx for now.  - MSK:    - PT/OT once  extubated    - Dispo: Continue SICU care      Claritza Menezes MD  General Surgery  [facility] - [facility] Int[facility]

## 2022-11-04 NOTE — PROCEDURES
"Angelita Camara is a 61 y.o. female patient.    Temp: 98.5 °F (36.9 °C) (11/04/22 0945)  Pulse: 73 (11/04/22 1100)  Resp: (!) 21 (11/04/22 0945)  BP: (!) 160/68 (11/04/22 1100)  SpO2: 96 % (11/04/22 1100)  Weight: 47.2 kg (104 lb) (11/04/22 0839)  Height: 5' 4" (162.6 cm) (11/04/22 0839)    PICC  Date/Time: 11/4/2022 11:21 AM  Performed by: Leanne Worthington RN  Assisting provider: Vernell Fleming RN  Consent Done: Yes  Time out: Immediately prior to procedure a time out was called to verify the correct patient, procedure, equipment, support staff and site/side marked as required  Indications: med administration and vascular access  Anesthesia: local infiltration  Local anesthetic: lidocaine 1% without epinephrine  Anesthetic Total (mL): 3  Preparation: skin prepped with ChloraPrep  Skin prep agent dried: skin prep agent completely dried prior to procedure  Sterile barriers: all five maximum sterile barriers used - cap, mask, sterile gown, sterile gloves, and large sterile sheet  Hand hygiene: hand hygiene performed prior to central venous catheter insertion  Location details: right brachial  Catheter type: triple lumen  Catheter size: 5 Fr  Catheter Length: 35cm    Ultrasound guidance: yes  Vessel Caliber: large and patent, compressibility normal  Vascular Doppler: not done  Needle advanced into vessel with real time Ultrasound guidance.  Guidewire confirmed in vessel.  Image recorded and saved.  Sterile sheath used.  Number of attempts: 1  Post-procedure: blood return through all ports, chlorhexidine patch and sterile dressing applied  Technical procedures used: 3CG  Specimens: No  Implants: No  Assessment: placement verified by x-ray  Complications: none        Name KELY Fleming RN  11/4/2022    "

## 2022-11-04 NOTE — PLAN OF CARE
Plan of care reviewed with pt's spouse present at bedside, and verbalization of understanding obtained. Emotional support offered.    Problem: Adult Inpatient Plan of Care  Goal: Plan of Care Review  Outcome: Ongoing, Progressing  Goal: Patient-Specific Goal (Individualized)  Outcome: Ongoing, Progressing  Goal: Absence of Hospital-Acquired Illness or Injury  Outcome: Ongoing, Progressing  Goal: Optimal Comfort and Wellbeing  Outcome: Ongoing, Progressing  Goal: Readiness for Transition of Care  Outcome: Ongoing, Progressing     Problem: Infection  Goal: Absence of Infection Signs and Symptoms  Outcome: Ongoing, Progressing     Problem: Fall Injury Risk  Goal: Absence of Fall and Fall-Related Injury  Outcome: Ongoing, Progressing     Problem: Restraint, Nonbehavioral (Nonviolent)  Goal: Absence of Harm or Injury  Outcome: Ongoing, Progressing     Problem: Skin Injury Risk Increased  Goal: Skin Health and Integrity  Outcome: Ongoing, Progressing     Problem: Communication Impairment (Mechanical Ventilation, Invasive)  Goal: Effective Communication  Outcome: Ongoing, Progressing     Problem: Device-Related Complication Risk (Mechanical Ventilation, Invasive)  Goal: Optimal Device Function  Outcome: Ongoing, Progressing     Problem: Inability to Wean (Mechanical Ventilation, Invasive)  Goal: Mechanical Ventilation Liberation  Outcome: Ongoing, Progressing     Problem: Nutrition Impairment (Mechanical Ventilation, Invasive)  Goal: Optimal Nutrition Delivery  Outcome: Ongoing, Progressing     Problem: Skin and Tissue Injury (Mechanical Ventilation, Invasive)  Goal: Absence of Device-Related Skin and Tissue Injury  Outcome: Ongoing, Progressing     Problem: Ventilator-Induced Lung Injury (Mechanical Ventilation, Invasive)  Goal: Absence of Ventilator-Induced Lung Injury  Outcome: Ongoing, Progressing     Problem: Communication Impairment (Artificial Airway)  Goal: Effective Communication  Outcome: Ongoing, Progressing      Problem: Device-Related Complication Risk (Artificial Airway)  Goal: Optimal Device Function  Outcome: Ongoing, Progressing     Problem: Skin and Tissue Injury (Artificial Airway)  Goal: Absence of Device-Related Skin or Tissue Injury  Outcome: Ongoing, Progressing     Problem: Noninvasive Ventilation Acute  Goal: Effective Unassisted Ventilation and Oxygenation  Outcome: Ongoing, Progressing     Problem: Bleeding (Surgery Nonspecified)  Goal: Absence of Bleeding  Outcome: Ongoing, Progressing     Problem: Bowel Motility Impaired (Surgery Nonspecified)  Goal: Effective Bowel Elimination  Outcome: Ongoing, Progressing     Problem: Fluid and Electrolyte Imbalance (Surgery Nonspecified)  Goal: Fluid and Electrolyte Balance  Outcome: Ongoing, Progressing     Problem: Glycemic Control Impaired (Surgery Nonspecified)  Goal: Blood Glucose Level Within Targeted Range  Outcome: Ongoing, Progressing     Problem: Infection (Surgery Nonspecified)  Goal: Absence of Infection Signs and Symptoms  Outcome: Ongoing, Progressing     Problem: Ongoing Anesthesia Effects (Surgery Nonspecified)  Goal: Anesthesia/Sedation Recovery  Outcome: Ongoing, Progressing     Problem: Pain (Surgery Nonspecified)  Goal: Acceptable Pain Control  Outcome: Ongoing, Progressing     Problem: Postoperative Nausea and Vomiting (Surgery Nonspecified)  Goal: Nausea and Vomiting Relief  Outcome: Ongoing, Progressing     Problem: Postoperative Urinary Retention (Surgery Nonspecified)  Goal: Effective Urinary Elimination  Outcome: Ongoing, Progressing     Problem: Respiratory Compromise (Surgery Nonspecified)  Goal: Effective Oxygenation and Ventilation  Outcome: Ongoing, Progressing

## 2022-11-04 NOTE — SUBJECTIVE & OBJECTIVE
Past Medical History:   Diagnosis Date    Bicytopenia 2022    Cancer     Chronic back pain     H/O: UGI bleed     Hypertension     PUD (peptic ulcer disease)     Spondylolisthesis at L5-S1 level        Past Surgical History:   Procedure Laterality Date    BREAST BIOPSY Right     Benign    CATARACT EXTRACTION, BILATERAL  2018, 2018    CERVICAL SPINE SURGERY       SECTION      CHOLECYSTECTOMY  2022    Procedure: CHOLECYSTECTOMY;  Surgeon: Melo Schafer MD;  [facility];  Service: General;;    COLONOSCOPY  2019    Dr Case    ENDOSCOPIC NASAL CAUTERIZATION Bilateral 2022    Procedure: CAUTERIZATION, NOSE, ENDOSCOPIC;  Surgeon: Fabiano Cisneros MD;  Location: [facility];  Service: ENT;  Laterality: Bilateral;    ENDOSCOPIC ULTRASOUND OF UPPER GASTROINTESTINAL TRACT Left 2022    Procedure: ULTRASOUND, UPPER GI TRACT, ENDOSCOPIC;  Surgeon: Matt Alonso MD;  Location: [facility];  Service: Endoscopy;  Laterality: Left;    ESOPHAGECTOMY  2022    Procedure: ESOPHAGECTOMY;  Surgeon: Melo Schafer MD;  [address];  Service: General;;  Total thoracic esophagectomy, proximal stomach, and mediastinal abdominal lymph node excision    ESOPHAGOGASTRODUODENOSCOPY N/A 2022    Procedure: EGD (ESOPHAGOGASTRODUODENOSCOPY);  Surgeon: Matt Alonso MD;  Location: [facility];  Service: Endoscopy;  Laterality: N/A;    HYSTERECTOMY      total    INSERTION OF TUNNELED CENTRAL VENOUS CATHETER (CVC) WITH SUBCUTANEOUS PORT Right 2022    Procedure: [id number];  Surgeon: Angel Cedeno MD;  Location: [facility];  Service: General;  Laterality: Right;  Right Subclavian    LYSIS OF ADHESIONS  2022    Procedure: LYSIS, ADHESIONS;  Surgeon: Melo Schafer MD;  [facility];  Service: General;;    OOPHORECTOMY      PEPTIC ULCER REMOVAL      ROBOT-ASSISTED SURGICAL REMOVAL OF ESOPHAGUS USING DA JANINE XI N/A 2022     Procedure: XI ROBOTIC ESOPHAGECTOMY Converted to open at 1215 ;  Surgeon: Melo Schafer MD;  Location: [facility] OR [address] [place] [address];  Service: General;  Laterality: N/A;    SPINAL FUSION      THORACOSCOPIC WEDGE RESECTION OF LUNG Right 11/2/2022    Procedure: VATS, WITH WEDGE RESECTION, LUNG;  Surgeon: Melo Schafer MD;  Location: [facility] OR [address] [place] [address];  Service: General;  Laterality: Right;       Review of patient's allergies indicates:   Allergen Reactions    Shellfish containing products Nausea And Vomiting     All seafood    Topamax [topiramate] Nausea And Vomiting and Other (See Comments)     Vertigo         No current facility-administered medications on file prior to encounter.     Current Outpatient Medications on File Prior to Encounter   Medication Sig    amiodarone (PACERONE) 200 MG Tab Take 1 tablet (200 mg total) by mouth once daily.    HYDROcodone-acetaminophen (NORCO) 7.5-325 mg per tablet Take 1 tablet by mouth every 6 (six) hours as needed for Pain.    losartan (COZAAR) 100 MG tablet Take 1 tablet (100 mg total) by mouth once daily.    multivitamin (THERAGRAN) per tablet Take 1 tablet by mouth once daily.    ondansetron (ZOFRAN-ODT) 8 MG TbDL Take 8 mg by mouth daily as needed (nausea/vomiting).    prochlorperazine (COMPAZINE) 10 MG tablet Take 10 mg by mouth nightly as needed (nausea/vomiting).    [DISCONTINUED] losartan-hydrochlorothiazide 100-25 mg (HYZAAR) 100-25 mg per tablet Take 1 tablet by mouth once daily.     Family History       Problem Relation (Age of Onset)    Cancer Maternal Grandmother    Diabetes Father    Heart disease Mother, Father          Tobacco Use    Smoking status: Every Day     Packs/day: 0.50     Types: Cigarettes     Start date: 1977    Smokeless tobacco: Never   Substance and Sexual Activity    Alcohol use: Yes     Comment: occasional    Drug use: No    Sexual activity: Yes     Partners: Male     Review of Systems   Unable to perform ROS: Intubated   Objective:     Vital Signs  (Most Recent):  Temp: 98.5 °F (36.9 °C) (11/04/22 0945)  Pulse: 70 (11/04/22 0945)  Resp: (!) 21 (11/04/22 0945)  BP: 139/63 (11/04/22 0900)  SpO2: (!) 94 % (11/04/22 0945)   Vital Signs (24h Range):  Temp:  [97.8 °F (36.6 °C)-99 °F (37.2 °C)] 98.5 °F (36.9 °C)  Pulse:  [] 70  Resp:  [9-36] 21  SpO2:  [79 %-100 %] 94 %  BP: ()/(48-96) 139/63  Arterial Line BP: ()/(34-63) 143/54     Weight: 47.2 kg (104 lb)  Body mass index is 17.85 kg/m².    SpO2: (!) 94 %  O2 Device (Oxygen Therapy): ventilator      Intake/Output Summary (Last 24 hours) at 11/4/2022 0954  Last data filed at 11/4/2022 0930  Gross per 24 hour   Intake 6596.26 ml   Output 2248 ml   Net 4348.26 ml       Lines/Drains/Airways       Central Venous Catheter Line  Duration             Port A Cath Single Lumen 11/03/22 2300 right subclavian <1 day              Drain  Duration                  Chest Tube 11/02/22 Right Midaxillary 24 Fr. 2 days         Closed/Suction Drain 11/02/22 1630 Left;Superior Chest Bulb 19 Fr. 1 day         Urethral Catheter 11/03/22 2208 Straight-tip 16 Fr. <1 day              Airway  Duration                  Airway - Non-Surgical 11/04/22 0209 Endotracheal Tube <1 day       Airway Anesthesia 11/04/22 <1 day              Arterial Line  Duration             Arterial Line 11/04/22 0237 Left Radial <1 day              Peripheral Intravenous Line  Duration                  Peripheral IV - Single Lumen 11/03/22 2209 20 G Left Antecubital <1 day         Peripheral IV - Single Lumen 11/04/22 0240 20 G Left Forearm <1 day         Peripheral IV - Single Lumen 11/04/22 0240 20 G Left Wrist <1 day         Peripheral IV - Single Lumen 11/04/22 0240 20 G Right Forearm <1 day                    Physical Exam  Vitals reviewed.   Constitutional:       Appearance: She is ill-appearing.   HENT:      Head: Normocephalic and atraumatic.      Mouth/Throat:      Mouth: Mucous membranes are moist.   Eyes:      Conjunctiva/sclera:  Conjunctivae normal.   Neck:      Comments: No JVD  Cardiovascular:      Rate and Rhythm: Normal rate and regular rhythm.      Pulses: Normal pulses.   Pulmonary:      Comments: intubated  Musculoskeletal:      Cervical back: Normal range of motion.      Right lower leg: No edema.      Left lower leg: No edema.      Comments: Warm BLE   Skin:     Capillary Refill: Capillary refill takes less than 2 seconds.      Findings: No rash.   Neurological:      Mental Status: She is alert and oriented to person, place, and time.   Psychiatric:         Mood and Affect: Mood normal.       Significant Labs: All pertinent lab results from the last 24 hours have been reviewed.

## 2022-11-04 NOTE — PLAN OF CARE
Recommendations    1) Increase TPN: 190 D + 60 AA   -Provides 889 kcals, 60g Protein (2.79)   -As propofol weaned, add IV Lipids     2) RD following    Goals: Meet % EEN by next RD f/u  Nutrition Goal Status: new  Communication of RD Recs: other (comment) (POC)

## 2022-11-04 NOTE — ASSESSMENT & PLAN NOTE
  Neuro/Psych:   -- Sedation: 50 propofol   -- Pain: Dilaudid PRN / Fentanyl              Cards:   -- HDS  -- BNP 1147  -- Cards consulted for echo given low CI on flow track. Bedside echo demonstrated adequate contraction of all chambers without major abnormalities. Suspicion for cardiogenic pathology is low.   #A.fib  -- a.fib with RVR overnight: given metoprolol bolus without change - given bolus of amio however patient became hypotensive. Stopped amio gtt. Currently not running.       Pulm:   -- Vent A/C 7/100 - satting 90-93.  -- Wean PEEP as possible   -- Current smoker 1/2 PPD, COPD  -- Nicotine patch  -- Goal O2 sat > 90%  #CXR  -- Worsening bilateral pattern of opacity - SIRS vs. edema      Renal:  -- Villegas taken out yesterday however patient did not void all day, reinserted in the evening with initial output of 1L.   -- Stop mIV  -- BUN/Cr 15/0.7      FEN / GI:   -- Replace lytes as needed  -- Nutrition: NPO  -- Nutrition consult placed, rec's appreciated  -- TPN restarted last night however paused due to pressor requirements.       ID:   -- Tm: afebrile; WBC 14.3  -- Abx none      Heme/Onc:   -- H/H stable 8.5/24.8  -- Daily CBC  #Thrombocytopenia  -- Received 1 unit of platelet 11/2.  -- Heme/Onc consulted for possible ITP.     Endo:   -- Gluc goal 140-180  -- No hx DM      PPx:   Feeding: NPO  Analgesia/Sedation: See above  Thromboembolic prevention: lovenox   HOB >30: Yes  Stress Ulcer ppx: famotidine  Glucose control: Critical care goal 140-180 g/dl    Lines/Drains/Airway: Right CT, ETT, villegas, right radial a-line, neck ARMINDA.       Dispo/Code Status/Palliative:   -- SICU / Full Code

## 2022-11-04 NOTE — CONSULTS
SHARLA placed triple lumen PICC to right brachial vein using u/s guidance.  35 cm in length, 26 cm arm circumference and 0 cm exposed.   Lot # [id number].    PICC inserted for TPN

## 2022-11-04 NOTE — PLAN OF CARE
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]  Esophageal carcinoma [C15.9]    Admission Date: 11/2/2022  Expected Discharge Date:          Payor: HU[employer] MANAGED MEDICARE / Plan: HU[employer] MEDICARE PPO / Product Type: Medicare Advantage /     Extended Emergency Contact Information  Primary Emergency Contact: Stas Camara  Address: [address] [place] [address]  Home Phone: [phone]  Mobile Phone: [phone]  Relation: Spouse    Discharge Plan A: (P) Rehab  Discharge Plan B: (P) Home Health, Home with family      [id number] - [facility] [address] [place] [address]  Phone: [phone] Fax: [phone]    [facility] - [facility] [address] [facility]  [address] [facility] [address]  Phone: [phone] Fax: [phone]    [id number] - [place], [place] - [address] [facility] [address]  [address] [place] [address]  Phone: [phone] Fax: [phone]    [facility] [address] [place] [address]  Phone: [phone] Fax: [phone]      Initial Assessment (most recent)       Adult Discharge Assessment - 11/04/22 1004          Discharge Assessment    Confirmed/corrected address, phone number and insurance Yes (P)      Confirmed Demographics Correct on Facesheet (P)      Source of Information family (P)      When was your last doctors appointment? 10/14/22 (P)      Communicated BOLIVAR with patient/caregiver Date not available/Unable to determine (P)      Reason For Admission esophageal CA (P)      Lives With spouse (P)      [facility]ility [facility] From: [facility] (P)      Do you expect to return to your current living situation? Yes (P)      Do you have help at home or  someone to help you manage your care at home? Yes (P)      Prior to hospitilization cognitive status: No Deficits (P)      Current cognitive status: Coma/Sedated/Intubated (P)      Walking or Climbing Stairs Difficulty none (P)      Dressing/Bathing Difficulty none (P)      Home Accessibility stairs to enter home (P)      Number of Stairs, Main Entrance four (P)      Stair Railings, Main Entrance railings safe and in good condition (P)      Equipment Currently Used at Home none (P)      Readmission within 30 days? No (P)      Patient currently being followed by outpatient case management? No (P)      Do you currently have service(s) that help you manage your care at home? No (P)      Do you take prescription medications? Yes (P)      Do you have prescription coverage? Yes (P)      Coverage [employer] Managed Medicare (P)      Do you have any problems affording any of your prescribed medications? No (P)      Is the patient taking medications as prescribed? yes (P)      Who is going to help you get home at discharge?  Stas (P)      How do you get to doctors appointments? family or friend will provide (P)      Do you take coumadin? No (P)      Discharge Plan A Rehab (P)      Discharge Plan B Home Health;Home with family (P)      DME Needed Upon Discharge  other (see comments) (P)    TBD    Discharge Plan discussed with: Spouse/sig other (P)         Financial Resource Strain    How hard is it for you to pay for the very basics like food, housing, medical care, and heating? Not hard at all (P)         Housing Stability    In the last 12 months, was there a time when you were not able to pay the mortgage or rent on time? No (P)      In the last 12 months, how many places have you lived? 1 (P)      In the last 12 months, was there a time when you did not have a steady place to sleep or slept in a shelter (including now)? No (P)         Transportation Needs    In the past 12 months, has lack of transportation kept you  from medical appointments or from getting medications? No (P)      In the past 12 months, has lack of transportation kept you from meetings, work, or from getting things needed for daily living? No (P)         Food Insecurity    Within the past 12 months, you worried that your food would run out before you got the money to buy more. Never true (P)      Within the past 12 months, the food you bought just didn't last and you didn't have money to get more. Never true (P)         Relationship/Environment    Name(s) of Who Lives With Patient  Stas [phone] (P)                    ZACHARIAH met with patient and  at bedside.  Patient is intubated and her  answered questions.  Patient is S/P esophageal surgery following both chemo and radiation at [facility] in [place].  Patient lives with her spouse Stas and they have a SL with only 4 steps.  Patient's  is her MPOA and EC.  Patient has never been on HD or on a blood thinner.  Patient's  is available to assist with care at home after discharge.  Patient may need rehab.

## 2022-11-04 NOTE — NURSING
Pt remains in A-fib, rate 130-150.  Dr. Maria De Jesus Farrell notified and presents to bedside to assess.  Dr. Andujar aware.  Order received for one dose of Diltiazem 12 mg IV and for one dose of 100 ml 25% Albumin IV.

## 2022-11-04 NOTE — ASSESSMENT & PLAN NOTE
61-year-old female with a history of a GI bleed, pAF, HTN, PUD and esophageal cancer who presented for planned esophagectomy, completed on 11/2/22. On morning of 11/4, patient developed worsening hypotension requiring pressors. Flow track with low cardiac output. Cardiology consulted given concern for cardiogenic shock     Bedside echo with hyperdynamic EF    - SVO2 from port was 60, LE warm, EF preserved. No evidence of cardiogenic shock at this time  - if still concerned for cardiogenic source, would place RIJ CVC for CVP and SVO2 monitoring  - will sign off, please feel free to reach out with any questions or concerns    Results for orders placed during the hospital encounter of 11/02/22    Echo    Interpretation Summary  · The left ventricle is normal in size with eccentric hypertrophy and normal systolic function. The estimated ejection fraction is 65%.  · Normal right ventricular size with normal right ventricular systolic function.  · Normal left ventricular diastolic function.  · Severe left atrial enlargement.  · Mild tricuspid regurgitation.  · Mild aortic regurgitation.  · Mechanically ventilated; cannot use inferior caval vein diameter to estimate central venous pressure.

## 2022-11-04 NOTE — NURSING
"Pt remains in A-fib, rate 130's-140's, normotensive.  Pt shakes head "no" when asked if in pain or distress. Dr. Maria De Jesus Farrell and Dr. Menezes with primary team present to bedside to assess.  5 mg Lopressor IV administered per orders.  After administration of Lopressor IV, heart rate decreases to 105, remaining in a-fib.  "

## 2022-11-04 NOTE — PT/OT/SLP PROGRESS
Physical Therapy      Patient Name:  Angelita Camara   MRN:  [id number]    Patient not seen today secondary to  (intubated and sedated and not appropriate for early mobility). Will follow-up tomorrow as appropriate.

## 2022-11-04 NOTE — PLAN OF CARE
Hematology Consult      Mrs. Camara is a 61 F with SCC of the distal esophagus (initial stage II) s/p NACR with carbo/taxol, hx of UGIB and Afib who presents for admission for planned robotic esophagectomy. Hematology was consulted for evaluation of thrombocytopenia in setting of persistent bicytopenia. Thrombocytopenia more severe with count as low as 2K shortly after chemotherapy completed but range of 40-70K.    Hospital course complicated by AHRF requirring intubation as well as Afib RVR requirring amio gtt. Cardiology was consulted to evaluate for shock as well as RVR. Patient currently on 65% FiO2 with interval worsening of CXR with pulmonary edema. PLT down to 34K and hgb down to 6.2 and was given 2u pRBCs with improvement to 10.8 post transfusion. Chest tube output roughly 500 cc with lower concern for rickie blood when talking to SICU team and bedside RN.     Consideration for ITP given previous response to IVIG and steroids most likely, nutritional deficiency given macrocytic anemia in setting of esophageal CA with known poor intake also considered. Better than expected response to pRBC transfusion which could indicate adequate marrow production. PLT mostly unchanged at 38K. Smear unrevealing, negative HIV/Hep B/C, retic indicate some hypoproliferation, LDH mildly elevated and hapto normal. B12 elevated and folate low normal.    Plan  - Will watch again for next 24 hours and if any further drop in PLT below 30K will start with dex 40 mg x 4 days  - if acute bleeding episode would also give platelets and steroids  - will consider IVIG if refractory to above  - follow up copper    Case discussed with Dr. Pushpa Godinez,   Hematology/Oncology  [facility] - Surgery (2nd Fl)

## 2022-11-04 NOTE — NURSING
"A-fib noted.  Pt normotensive.  Pt shakes head "no" when asked if in pain or distress, following commands.  Dr. Phill Schafer and Dr. Andujar present to bedside to assess.   Order received to initiate Amiodarone IV infusion at 1 mg/min. Plan of care reviewed with pt and pt's , present at bedside.  "

## 2022-11-04 NOTE — HPI
61-year-old female with a history of a GI bleed, pAF, HTN, PUD and esophageal cancer who presented for planned esophagectomy, completed on 11/2/22. Post op she was moved to SICU. Hospital course complicated by AF with RVR, on amio. Also acute hypoxic respiratory failure requiring intubation. CXR with diffuse infiltrates bilaterally. On morning of 11/4, patient developed worsening hypotension requiring pressors. Flow track with low cardiac output. Cardiology consulted given concern for cardiogenic shock     Bedside echo with hyperdynamic EF

## 2022-11-04 NOTE — ANESTHESIA PROCEDURE NOTES
Ad Hoc Intubation    Date/Time: 11/4/2022 2:22 AM  Performed by: Hakeem Vargas MD  Authorized by: Alina Singleton MD     Indications:  Hypoxemia and respiratory failure  Diagnosis:  Acute hypoxic respiratory failure  Patient Location:  ICU  Timeout:  11/4/2022 2:20 AM  Procedure Start Time:  11/4/2022 2:21 AM  Procedure End Time:  11/4/2022 2:22 AM  Staff:     Anesthesiologist Present: Yes    Intubation:     Induction:  Rapid sequence induction    Intubated:  Postinduction    Mask Ventilation:  N/a    Attempts:  1    Attempted By:  Resident anesthesiologist    Method of Intubation:  Video laryngoscopy    Blade:  Angulo 3    Laryngeal View Grade: Grade I - full view of chords      Difficult Airway Encountered?: No      Complications:  None    Airway Device:  Oral endotracheal tube    Airway Device Size:  7.5    Style/Cuff Inflation:  Cuffed (inflated to minimal occlusive pressure)    Tube secured:  22    Secured at:  The lips    Placement Verified By:  Colorimetric ETCO2 device and Chest x-ray    Complicating Factors:  None    Findings Post-Intubation:  BS equal bilateral

## 2022-11-04 NOTE — NURSING
Normal sinus rhythm noted, rate 70's, with pt normotensive.  Dr Maria De Jesus Farrell notified and aware.

## 2022-11-04 NOTE — SUBJECTIVE & OBJECTIVE
Interval History:   Multiple issues overnight including urinary retention with villegas placement, hypoxic respiratory failure secondary to fluid overload now intubated, hypotension requiring short period of vasopressors, metabolic acidosis, a fib with RVR, and concern for heart failure with cardiology consulted.     These have all improved dramatically with resuscitation. This morning she is no longer on vasopressors and her vent settings are decreased. She is now in sinus rhythm as well and her CI has improved.    Labs this morning notable for H/H of 6.4/18.7 and plts are pending. CMP without evidence of renal failure or liver injury. BNP is 1147. CXR consistent with fluid overload.     Medications:  Continuous Infusions:   sodium chloride 0.9% 100 mL/hr at 11/04/22 0600    amiodarone in dextrose 5% Stopped (11/04/22 0200)    NORepinephrine bitartrate-D5W Stopped (11/04/22 0445)    propofoL 50 mcg/kg/min (11/04/22 0600)    Standard Custom [facility] ADULT TPN for patient with NO electrolyte abnormality and NO renal dysfunction (CENTRAL) Stopped (11/04/22 0200)     Scheduled Meds:   amiodarone in dextrose  150 mg Intravenous Once    enoxaparin  30 mg Subcutaneous Daily    etomidate        famotidine (PF)  20 mg Intravenous BID    levalbuterol  0.63 mg Nebulization Q6H WAKE    nicotine  1 patch Transdermal Daily    phenylephrine HCl in 0.9% NaCl        sodium phosphate IVPB  20.01 mmol Intravenous Once    succinylcholine         PRN Meds:sodium chloride, calcium gluconate IVPB, calcium gluconate IVPB, calcium gluconate IVPB, hydrALAZINE, HYDROmorphone, magnesium sulfate IVPB, magnesium sulfate IVPB, naloxone, ondansetron, potassium chloride **AND** potassium chloride **AND** potassium chloride, sodium chloride 0.9%, sodium chloride 0.9%, sodium phosphate IVPB, sodium phosphate IVPB, sodium phosphate IVPB     Review of patient's allergies indicates:   Allergen Reactions    Shellfish containing products Nausea And Vomiting      All seafood    Topamax [topiramate] Nausea And Vomiting and Other (See Comments)     Vertigo       Objective:     Vital Signs (Most Recent):  Temp: 98.4 °F (36.9 °C) (11/04/22 0300)  Pulse: 62 (11/04/22 0700)  Resp: 20 (11/04/22 0700)  BP: (!) 94/55 (11/04/22 0600)  SpO2: 99 % (11/04/22 0700)   Vital Signs (24h Range):  Temp:  [98.2 °F (36.8 °C)-99.5 °F (37.5 °C)] 98.4 °F (36.9 °C)  Pulse:  [] 62  Resp:  [5-36] 20  SpO2:  [79 %-100 %] 99 %  BP: ()/(48-96) 94/55  Arterial Line BP: ()/(34-64) 133/48     Weight: 47.6 kg (104 lb 15 oz)  Body mass index is 18.01 kg/m².    Intake/Output - Last 3 Shifts         11/02 0700 11/03 0659 11/03 0700 11/04 0659 11/04 0700 11/05 0659    I.V. (mL/kg) 2078.5 (43.7) 3721 (78.2)     Blood 685 500     IV Piggyback 3598.7 1316.4     TPN  179.6     Total Intake(mL/kg) 6362.2 (133.7) 5717 (120.1)     Urine (mL/kg/hr) 900 (0.8) 1775 (1.6)     Drains 25 28     Blood 250      Chest Tube 500 540     Total Output 1675 2343     Net +4687.2 +3374                    Physical Exam    Significant Labs:  I have reviewed all pertinent lab results within the past 24 hours.  CBC:   Recent Labs   Lab 11/03/22 2223 11/03/22  2234 11/04/22  0446   WBC 14.30*  --  11.45   RBC 2.40*  --  1.80*   HGB 8.5*  --  6.4*   HCT 24.8*   < > 18.7*   PLT 53*  --   --    *  --  104*   MCH 35.4*  --  35.6*   MCHC 34.3  --  34.2    < > = values in this interval not displayed.     CMP:   Recent Labs   Lab 11/04/22 0446   *   CALCIUM 8.0*   ALBUMIN 3.1*   PROT 5.1*      K 4.1   CO2 20*      BUN 15   CREATININE 0.7   ALKPHOS 48*   ALT 15   AST 35   BILITOT 0.5     Coagulation:   Recent Labs   Lab 11/02/22  1746 11/04/22 0446   LABPROT 11.2 11.9   INR 1.1 1.2   APTT 37.0*  --      Cardiac markers: No results for input(s): CKMB, CPKMB, TROPONINT, TROPONINI, MYOGLOBIN in the last 168 hours.  ABGs:   Recent Labs   Lab 11/04/22 0428   PH 7.302*   PCO2 47.2*   PO2 35*   HCO3  23.3*   POCSATURATED 61*   BE -3       Significant Diagnostics:  I have reviewed all pertinent imaging results/findings within the past 24 hours.

## 2022-11-04 NOTE — SIGNIFICANT EVENT
Surgical Oncology    Arrived from PACU to SICU around 9 PM last night. Multiple issues noted at that time including urinary retention and a fib with RVR. A villegas was placed with 800 mL UOP and her IV that was supposed to have amio gtt running was not working so line changed and an amio bolus was started. During this time she became bradycardic and hypotensive and so levophed was started and 500 mL albumin was given.     Throughout the night however, she has had increasing O2 requirements and is now intubated on high vent settings (FiO2 100%, PEEP 7). She has ongoing evidence of hypoxia on her ABG along with metabolic acidosis. She is being resuscitated with IVF in attempt to wean pressors so as not to compromise the gastric conduit and anastomoses. A flotrack was set up with SVV >12 and CI <1.5.       Multiple issues to be addressed at this time    - Hypoxic respiratory failure: will wean O2 as able. May need a bronch to ensure no mucus plugging. Has significant interstitial markings consistent with pulmonary edema on CXR.  - Heart failure: STAT echo and cardiology consult. Likely contributing to pulmonary edema  - Metabolic acidosis: 1 amp bicarb, follow up on ABG  - Hemodynamic instability: currently on 0.16 levo. Hopefully this will be able to be weaned with bicarb and IVF resuscitation.    Claritza Menezes MD  General Surgery, PGY-4  [phone]

## 2022-11-04 NOTE — PROCEDURES
"Angelita Camara is a 61 y.o. female patient.    Temp: 98.6 °F (37 °C) (11/03/22 2300)  Pulse: (!) 130 (11/04/22 0203)  Resp: (!) 32 (11/04/22 0145)  BP: 122/66 (11/04/22 0145)  SpO2: (!) 81 % (11/04/22 0145)  Weight: 47.6 kg (104 lb 15 oz) (11/02/22 0641)  Height: 5' 4" (162.6 cm) (11/02/22 0641)       Arterial Line    Date/Time: 11/4/2022 3:50 AM  Location procedure was performed: [facility]  Performed by: Jessica Rivas MD  Authorized by: Jessica Rivas MD   Pre-op Diagnosis: Hypoxia  Post-operative diagnosis: Hypoxia  Consent Done: Emergent Situation  Indications: multiple ABGs, respiratory failure and hemodynamic monitoring  Location: left radial    Patient sedated: yes  Sedation type: deep sedation    Sedatives: propofol  Needle gauge: 20  Seldinger technique: Seldinger technique used  Number of attempts: 1  Complications: No  Specimens: No  Implants: No  Post-procedure: line sutured and dressing applied  Patient tolerance: Patient tolerated the procedure well with no immediate complications        11/4/2022    "

## 2022-11-04 NOTE — ASSESSMENT & PLAN NOTE
Angelita Camara is a 61 y.o. female with squamous cell esophageal cancer s/p [place] esophagectomy on 11/2/22    - Neuro:   - PRN IV pain meds. Transition to PO once extubated   - Propofol  - CV   - Cncern for heart failure given elevated BNP with low CI at night.   - Cardiology evaluated at bedside with echo and felt she had a hyperdynamic LV.    - Formal ECHO today   - Restart amio for hx of a fib.    - Avoid vasopressors as this can compromise the gastric conduit and anastomoses   - Respit:    - Wean vent settings as able. Ideally would get her extubated today, but given pulm edema will be cautious with this.    - If she has increasing requirements, would go up on FiO2 before going up on PEEP  - Renal:    - Keep villegas for strict I/O and urinary retention yesterday.    - BUN/Cr WNL today  - FEN:   - She is net positive 3.7L overnight. Hemodynamics have improved.    - Will consider diuresis later day   - Replace lytes PRN. Needs phos   - NPO   - TPN  - GI:    - s/p esophagectomy   - Cervical ARMINDA bulb to suction   - GI ppx.  - Endo:    - No know hx of DM   - q6 POCT glucose  - Heme:    - Hgb 6.4 from 8.5 last night. May be partially dilutional.    - Recheck CBC. Okay to transfuse pRBC   - Hematology consulted for possible ITP. Plts today are pending   - DVT ppx  - ID:    - No evidence of infectious process. Hold off on abx for now.  - MSK:    - PT/OT once extubated    - Dispo: Continue SICU care

## 2022-11-04 NOTE — NURSING TRANSFER
Nursing Transfer Note      11/3/2022     Reason patient is being transferred: Meets criteria    Transfer To: [id number]    Transfer via bed    Transfer with comfort flow to O2, cardiac monitoring    Transported by RN, RT    Medicines sent: TPN    Any special needs or follow-up needed: None    Chart send with patient: Yes    Notified: spouse    Patient reassessed at: 11/3

## 2022-11-04 NOTE — PT/OT/SLP PROGRESS
Occupational Therapy      Patient Name:  Angelita Camara   MRN:  [id number]    OT orders received. Patient not seen today secondary to pt intubated and sedated, not appropirate for early mobility. Will follow-up 11/7/22.    11/4/2022

## 2022-11-04 NOTE — SUBJECTIVE & OBJECTIVE
Interval History/Significant Events: See Significant event note for full details. In summary, patient transferred from PACU to SICU requiring additional oxygen support. Began decompensating with bradycardia, hypotension, and hypoxia and was subsequently intubated and placed on levo for 1 hour until sufficient resuscitation was achieved. Patient currently HDS, intubated, off pressors.     Follow-up For: Procedure(s) (LRB):  XI ROBOTIC ESOPHAGECTOMY Converted to open at 1215  (N/A)  LYSIS, ADHESIONS  VATS, WITH WEDGE RESECTION, LUNG (Right)  CHOLECYSTECTOMY  ESOPHAGECTOMY    Post-Operative Day: 2 Days Post-Op    Objective:     Vital Signs (Most Recent):  Temp: 98.4 °F (36.9 °C) (11/04/22 0300)  Pulse: 73 (11/04/22 0422)  Resp: (!) 25 (11/04/22 0454)  BP: 119/67 (11/04/22 0422)  SpO2: (!) 94 % (11/04/22 0422)   Vital Signs (24h Range):  Temp:  [98.2 °F (36.8 °C)-99.5 °F (37.5 °C)] 98.4 °F (36.9 °C)  Pulse:  [] 73  Resp:  [5-36] 25  SpO2:  [79 %-100 %] 94 %  BP: ()/(48-96) 119/67  Arterial Line BP: ()/(34-64) 136/51     Weight: 47.6 kg (104 lb 15 oz)  Body mass index is 18.01 kg/m².      Intake/Output Summary (Last 24 hours) at 11/4/2022 0550  Last data filed at 11/4/2022 0400  Gross per 24 hour   Intake 5251.92 ml   Output 2248 ml   Net 3003.92 ml       Physical Exam  Vitals reviewed.   Constitutional:       Appearance: She is ill-appearing.   HENT:      Mouth/Throat:      Comments: ETT in place 7/100  Neck:      Comments: Left neck incision CDI  ARMINDA drain with minimal fluid.  Cardiovascular:      Rate and Rhythm: Normal rate and regular rhythm.      Pulses: Normal pulses.      Heart sounds: Normal heart sounds.   Pulmonary:      Effort: Pulmonary effort is normal.      Breath sounds: Normal breath sounds.   Chest:      Comments: Right tunneled portacath  CT in place on suction with SS output.  Abdominal:      General: Abdomen is flat. Bowel sounds are normal.      Palpations: Abdomen is soft.       Comments: Incisions CDI   Genitourinary:     Comments: Gregory in place  Skin:     General: Skin is warm.      Capillary Refill: Capillary refill takes less than 2 seconds.       Vents:  Vent Mode: A/C (11/04/22 0422)  Set Rate: 20 BPM (11/04/22 0422)  Vt Set: 450 mL (11/04/22 0422)  PEEP/CPAP: 7 cmH20 (11/04/22 0422)  Oxygen Concentration (%): 70 (11/04/22 0300)    Lines/Drains/Airways       Central Venous Catheter Line  Duration             Port A Cath Single Lumen 11/03/22 2300 right subclavian <1 day              Drain  Duration                  Chest Tube 11/02/22 Right Midaxillary 24 Fr. 2 days         Closed/Suction Drain 11/02/22 1630 Left;Superior Chest Bulb 19 Fr. 1 day         Urethral Catheter 11/03/22 2208 Straight-tip 16 Fr. <1 day              Airway  Duration                  Airway - Non-Surgical 11/04/22 0209 Endotracheal Tube <1 day       Airway Anesthesia 11/04/22 <1 day              Arterial Line  Duration             Arterial Line 11/04/22 0237 Left Radial <1 day              Peripheral Intravenous Line  Duration                  Peripheral IV - Single Lumen 11/03/22 2209 20 G Left Antecubital <1 day         Peripheral IV - Single Lumen 11/04/22 0240 20 G Left Forearm <1 day         Peripheral IV - Single Lumen 11/04/22 0240 20 G Left Wrist <1 day         Peripheral IV - Single Lumen 11/04/22 0240 20 G Right Forearm <1 day                    Significant Labs:    CBC/Anemia Profile:  Recent Labs   Lab 11/03/22  0342 11/03/22  1325 11/03/22  2223 11/03/22  2234 11/04/22  0148 11/04/22  0305   WBC 10.68 14.35* 14.30*  --   --   --    HGB 8.2* 8.3* 8.5*  --   --   --    HCT 24.0* 25.0* 24.8* 26* 20* 21*   PLT 49* 58* 53*  --   --   --    * 106* 103*  --   --   --    RDW SEE COMMENT SEE COMMENT SEE COMMENT  --   --   --    RETIC  --  1.9  --   --   --   --    FOLATE  --  9.2  --   --   --   --    [id number]  --  >2000*  --   --   --   --         Chemistries:  Recent Labs   Lab 11/02/22  8144  11/03/22  0342 11/03/22  2223 11/04/22  0446    133* 137 136   K 3.7 4.3 3.7 4.1    107 108 108   CO2 19* 20* 18* 20*   BUN 11 16 16 15   CREATININE 0.6 0.7 0.6 0.7   CALCIUM 8.1* 8.2* 8.5* 8.0*   ALBUMIN 2.9* 2.9* 2.8* 3.1*   PROT 4.8* 5.3* 5.7* 5.1*   BILITOT 1.1* 0.7 0.5 0.5   ALKPHOS 52* 56 64 48*   ALT 26 25 21 15   AST 47* 40 42* 35   MG 1.8 2.2 1.7 2.1   PHOS 4.1 3.1  --  1.8*       Significant Imaging:  I have reviewed all pertinent imaging results/findings within the past 24 hours.

## 2022-11-04 NOTE — CONSULTS
[facility]  Cardiology  Consult Note    Patient Name: Angelita Camara  MRN: [id number]  Admission Date: 2022  Hospital Length of Stay: 2 days  Code Status: Full Code   Attending Provider: Melo Schafer MD   Consulting Provider: Jose Shrestha MD  Primary Care Physician: Yaakov Dan II, MD  Principal Problem:Squamous cell esophageal cancer    Patient information was obtained from patient, past medical records and ER records.     Inpatient consult to Cardiology  Consult performed by: Jose Shrestha MD  Consult ordered by: Jessica Rivas MD        Subjective:     Chief Complaint:  SSC     HPI:   61-year-old female with a history of a GI bleed, pAF, HTN, PUD and esophageal cancer who presented for planned esophagectomy, completed on 22. Post op she was moved to SICU. Hospital course complicated by AF with RVR, on amio. Also acute hypoxic respiratory failure requiring intubation. CXR with diffuse infiltrates bilaterally. On morning of , patient developed worsening hypotension requiring pressors. Flow track with low cardiac output. Cardiology consulted given concern for cardiogenic shock     Bedside echo with hyperdynamic EF       Past Medical History:   Diagnosis Date    Bicytopenia 2022    Cancer     Chronic back pain     H/O: UGI bleed     Hypertension     PUD (peptic ulcer disease)     Spondylolisthesis at L5-S1 level        Past Surgical History:   Procedure Laterality Date    BREAST BIOPSY Right     Benign    CATARACT EXTRACTION, BILATERAL  2018, 2018    CERVICAL SPINE SURGERY       SECTION      CHOLECYSTECTOMY  2022    Procedure: CHOLECYSTECTOMY;  Surgeon: Melo Schafer MD;  Location: [address];  Service: General;;    COLONOSCOPY  2019    Dr Case    ENDOSCOPIC NASAL CAUTERIZATION Bilateral 2022    Procedure: CAUTERIZATION, NOSE, ENDOSCOPIC;  Surgeon: Fabiano Cisneros MD;  Location: [facility];  Service: ENT;  Laterality:  Bilateral;    ENDOSCOPIC ULTRASOUND OF UPPER GASTROINTESTINAL TRACT Left 6/21/2022    Procedure: ULTRASOUND, UPPER GI TRACT, ENDOSCOPIC;  Surgeon: Matt Alonso MD;  Location[facility];  Service: Endoscopy;  Laterality: Left;    ESOPHAGECTOMY  11/2/2022    Procedure: ESOPHAGECTOMY;  Surgeon: Melo Schafer MD;  Location: [facility] OR [address] [place] [address];  Service: General;;  Total thoracic esophagectomy, proximal stomach, and mediastinal abdominal lymph node excision    ESOPHAGOGASTRODUODENOSCOPY N/A 6/21/2022    Procedure: EGD (ESOPHAGOGASTRODUODENOSCOPY);  Surgeon: Matt Alonso MD;  Location: [facility] ENDO;  Service: Endoscopy;  Laterality: N/A;    HYSTERECTOMY  2011    total    INSERTION OF TUNNELED CENTRAL VENOUS CATHETER (CVC) WITH SUBCUTANEOUS PORT Right 6/23/2022    Procedure: [id number];  Surgeon: Angel Cedeno MD;  Location: [facility];  Service: General;  Laterality: Right;  Right Subclavian    LYSIS OF ADHESIONS  11/2/2022    Procedure: LYSIS, ADHESIONS;  Surgeon: Melo Schafer MD;  Location: [address];  Service: General;;    OOPHORECTOMY      PEPTIC ULCER REMOVAL      ROBOT-ASSISTED SURGICAL REMOVAL OF ESOPHAGUS USING DA JANINE XI N/A 11/2/2022    Procedure: XI ROBOTIC ESOPHAGECTOMY Converted to open at 1215 ;  Surgeon: Melo Schafer MD;  Location: [address];  Service: General;  Laterality: N/A;    SPINAL FUSION      THORACOSCOPIC WEDGE RESECTION OF LUNG Right 11/2/2022    Procedure: VATS, WITH WEDGE RESECTION, LUNG;  Surgeon: Melo Schafer MD;  Location: [address];  Service: General;  Laterality: Right;       Review of patient's allergies indicates:   Allergen Reactions    Shellfish containing products Nausea And Vomiting     All seafood    Topamax [topiramate] Nausea And Vomiting and Other (See Comments)     Vertigo         No current facility-administered medications on file prior to encounter.     Current Outpatient Medications on File Prior to Encounter    Medication Sig    amiodarone (PACERONE) 200 MG Tab Take 1 tablet (200 mg total) by mouth once daily.    HYDROcodone-acetaminophen (NORCO) 7.5-325 mg per tablet Take 1 tablet by mouth every 6 (six) hours as needed for Pain.    losartan (COZAAR) 100 MG tablet Take 1 tablet (100 mg total) by mouth once daily.    multivitamin (THERAGRAN) per tablet Take 1 tablet by mouth once daily.    ondansetron (ZOFRAN-ODT) 8 MG TbDL Take 8 mg by mouth daily as needed (nausea/vomiting).    prochlorperazine (COMPAZINE) 10 MG tablet Take 10 mg by mouth nightly as needed (nausea/vomiting).    [DISCONTINUED] losartan-hydrochlorothiazide 100-25 mg (HYZAAR) 100-25 mg per tablet Take 1 tablet by mouth once daily.     Family History       Problem Relation (Age of Onset)    Cancer Maternal Grandmother    Diabetes Father    Heart disease Mother, Father          Tobacco Use    Smoking status: Every Day     Packs/day: 0.50     Types: Cigarettes     Start date: 1977    Smokeless tobacco: Never   Substance and Sexual Activity    Alcohol use: Yes     Comment: occasional    Drug use: No    Sexual activity: Yes     Partners: Male     Review of Systems   Unable to perform ROS: Intubated   Objective:     Vital Signs (Most Recent):  Temp: 98.5 °F (36.9 °C) (11/04/22 0945)  Pulse: 70 (11/04/22 0945)  Resp: (!) 21 (11/04/22 0945)  BP: 139/63 (11/04/22 0900)  SpO2: (!) 94 % (11/04/22 0945)   Vital Signs (24h Range):  Temp:  [97.8 °F (36.6 °C)-99 °F (37.2 °C)] 98.5 °F (36.9 °C)  Pulse:  [] 70  Resp:  [9-36] 21  SpO2:  [79 %-100 %] 94 %  BP: ()/(48-96) 139/63  Arterial Line BP: ()/(34-63) 143/54     Weight: 47.2 kg (104 lb)  Body mass index is 17.85 kg/m².    SpO2: (!) 94 %  O2 Device (Oxygen Therapy): ventilator      Intake/Output Summary (Last 24 hours) at 11/4/2022 0954  Last data filed at 11/4/2022 0930  Gross per 24 hour   Intake 6596.26 ml   Output 2248 ml   Net 4348.26 ml       Lines/Drains/Airways       Central Venous  Catheter Line  Duration             Port A Cath Single Lumen 11/03/22 2300 right subclavian <1 day              Drain  Duration                  Chest Tube 11/02/22 Right Midaxillary 24 Fr. 2 days         Closed/Suction Drain 11/02/22 1630 Left;Superior Chest Bulb 19 Fr. 1 day         Urethral Catheter 11/03/22 2208 Straight-tip 16 Fr. <1 day              Airway  Duration                  Airway - Non-Surgical 11/04/22 0209 Endotracheal Tube <1 day       Airway Anesthesia 11/04/22 <1 day              Arterial Line  Duration             Arterial Line 11/04/22 0237 Left Radial <1 day              Peripheral Intravenous Line  Duration                  Peripheral IV - Single Lumen 11/03/22 2209 20 G Left Antecubital <1 day         Peripheral IV - Single Lumen 11/04/22 0240 20 G Left Forearm <1 day         Peripheral IV - Single Lumen 11/04/22 0240 20 G Left Wrist <1 day         Peripheral IV - Single Lumen 11/04/22 0240 20 G Right Forearm <1 day                    Physical Exam  Vitals reviewed.   Constitutional:       Appearance: She is ill-appearing.   HENT:      Head: Normocephalic and atraumatic.      Mouth/Throat:      Mouth: Mucous membranes are moist.   Eyes:      Conjunctiva/sclera: Conjunctivae normal.   Neck:      Comments: No JVD  Cardiovascular:      Rate and Rhythm: Normal rate and regular rhythm.      Pulses: Normal pulses.   Pulmonary:      Comments: intubated  Musculoskeletal:      Cervical back: Normal range of motion.      Right lower leg: No edema.      Left lower leg: No edema.      Comments: Warm BLE   Skin:     Capillary Refill: Capillary refill takes less than 2 seconds.      Findings: No rash.   Neurological:      Mental Status: She is alert and oriented to person, place, and time.   Psychiatric:         Mood and Affect: Mood normal.       Significant Labs: All pertinent lab results from the last 24 hours have been reviewed.        Assessment and Plan:     Shock  61-year-old female with a history  of a GI bleed, pAF, HTN, PUD and esophageal cancer who presented for planned esophagectomy, completed on 11/2/22. On morning of 11/4, patient developed worsening hypotension requiring pressors. Flow track with low cardiac output. Cardiology consulted given concern for cardiogenic shock     Bedside echo with hyperdynamic EF    - SVO2 from port was 60, LE warm, EF preserved. No evidence of cardiogenic shock at this time  - if still concerned for cardiogenic source, would place RIJ CVC for CVP and SVO2 monitoring  - will sign off, please feel free to reach out with any questions or concerns    Results for orders placed during the hospital encounter of 11/02/22    Echo    Interpretation Summary  · The left ventricle is normal in size with eccentric hypertrophy and normal systolic function. The estimated ejection fraction is 65%.  · Normal right ventricular size with normal right ventricular systolic function.  · Normal left ventricular diastolic function.  · Severe left atrial enlargement.  · Mild tricuspid regurgitation.  · Mild aortic regurgitation.  · Mechanically ventilated; cannot use inferior caval vein diameter to estimate central venous pressure.          VTE Risk Mitigation (From admission, onward)         Ordered     enoxaparin injection 30 mg  Daily         11/03/22 0820     Place sequential compression device  Until discontinued         11/02/22 0622                    Jose Shrestha MD  Cardiology   [facility]

## 2022-11-04 NOTE — PLAN OF CARE
61-year-old female with a history of a GI bleed, pAF, HTN, PUD and esophageal cancer who presented for planned esophagectomy, completed on 11/2/22. On morning of 11/4, patient developed afib with RVR, worsening hypotension requiring pressors and hypoxia requiring intubation.    -- EF is stable at 65% with normal LV diastolic function  -- Recommend diuresis as patient was net positive from entire hospitalization  -- Recommend continuing amiodarone for maintenance of sinus rhythm as patient is high risk of going back into RVR. Can continue IV for at least 7 days unless patient obtains enteral access, then can switch to PO.  -- Patient with CHADSVASc of 2, no anticoagulation needed.      Juliann Rosas, DO  Cardiology   [facility]

## 2022-11-05 NOTE — ASSESSMENT & PLAN NOTE
  Neuro/Psych:   -- Sedation: 50 propofol, fentanyl infusion added on as pt was becoming tachypnic   -- Pain: Dilaudid PRN / Fentanyl              Cards:   -- HDS  -- BNP 1147  -- Cards consulted for echo given low CI on flow track. Bedside echo demonstrated adequate contraction of all chambers without major abnormalities. Suspicion for cardiogenic pathology is low.   #A.fib  -- a.fib with RVR: pt started back on amiodarone infusion yesterday. Pt bolused with dilt and metoprolol and got rate control. Amiodarone infusion continues, wean down to 0.5  -- continue aggressive diuresis today       Pulm:   -- Vent A/C 7/100 - satting 90-93.  -- Wean PEEP as possible   -- Current smoker 1/2 PPD, COPD  -- Nicotine patch  -- Goal O2 sat > 90%  #CXR  -- Worsening bilateral pattern of opacity - SIRS vs. Edema vs ARDS  -- fentanyl infusion started as pt was becoming tachypnic and developing resp alkalosis. Continue trending ABGs      Renal:  -- Villegas taken out yesterday however patient did not void all day, reinserted in the evening with initial output of 1L.   -- Stop mIV  -- BUN/Cr 17/0.6  -- strict I/Os  -- continue IV diuresis today       FEN / GI:   -- Replace lytes as needed  -- Nutrition: NPO  -- Nutrition consult placed, rec's appreciated  -- TPN continued      ID:   -- Tm: afebrile; WBC 13.33  -- Abx none      Heme/Onc:   -- H/H stable 9.6/27.3  -- Daily CBC  #Thrombocytopenia  -- Received 1 unit of platelet 11/2.  -- Heme/Onc consulted for possible ITP. Possible start of IVIG and steroids if plt count drops below 30k. Continue following heme/onc daily recs      Endo:   -- Gluc goal 140-180  -- No hx DM      PPx:   Feeding: NPO, TPN  Analgesia/Sedation: See above  Thromboembolic prevention: lovenox   HOB >30: Yes  Stress Ulcer ppx: famotidine  Glucose control: Critical care goal 140-180 g/dl    Lines/Drains/Airway: Right CT, ETT, villegas, right radial a-line, neck ARMINDA.       Dispo/Code Status/Palliative:   --  Continue SICU care / Full Code  -- Discussed with staff Dr. Smith, full attestation to follow

## 2022-11-05 NOTE — SUBJECTIVE & OBJECTIVE
Interval History: Urine output now 35/hr. Remains intubated, PEEP 8. WBC stable around 13.3, H/H improved at 9.6/27.3 (from 6.4/18.7), plts 34.    Medications:  Continuous Infusions:   sodium chloride 0.9% Stopped (11/05/22 0513)    sodium chloride 0.9% Stopped (11/05/22 0550)    amiodarone in dextrose 5% 1 mg/min (11/05/22 0600)    fentanyl 150 mcg/hr (11/05/22 0600)    propofoL 50 mcg/kg/min (11/05/22 0636)    TPN ADULT CENTRAL LINE CUSTOM 40 mL/hr at 11/05/22 0600     Scheduled Meds:   enoxaparin  30 mg Subcutaneous Daily    famotidine (PF)  20 mg Intravenous BID    nicotine  1 patch Transdermal Daily    sodium chloride 0.9%  10 mL Intravenous Q6H    sodium phosphate IVPB  15 mmol Intravenous Once     PRN Meds:calcium gluconate IVPB, calcium gluconate IVPB, calcium gluconate IVPB, fentaNYL, HYDROmorphone, magnesium sulfate IVPB, magnesium sulfate IVPB, naloxone, ondansetron, potassium chloride in water **AND** potassium chloride in water **AND** potassium chloride in water, sodium chloride 0.9%, sodium chloride 0.9%, Flushing PICC Protocol **AND** sodium chloride 0.9% **AND** sodium chloride 0.9%     Review of patient's allergies indicates:   Allergen Reactions    Shellfish containing products Nausea And Vomiting     All seafood    Topamax [topiramate] Nausea And Vomiting and Other (See Comments)     Vertigo       Objective:     Vital Signs (Most Recent):  Temp: 98.5 °F (36.9 °C) (11/05/22 0315)  Pulse: 71 (11/05/22 0645)  Resp: 12 (11/05/22 0645)  BP: (!) 127/53 (11/04/22 1652)  SpO2: (!) 92 % (11/05/22 0645)   Vital Signs (24h Range):  Temp:  [98.3 °F (36.8 °C)-100.1 °F (37.8 °C)] 98.5 °F (36.9 °C)  Pulse:  [] 71  Resp:  [9-29] 12  SpO2:  [90 %-100 %] 92 %  BP: (113-160)/(53-68) 127/53  Arterial Line BP: ()/(33-86) 128/78     Weight: 58.3 kg (128 lb 8.5 oz)  Body mass index is 22.06 kg/m².    Intake/Output - Last 3 Shifts         11/03 0700  11/04 0659 11/04 0700 11/05 0659 11/05 0700 11/06 0659     I.V. (mL/kg) 3721 (78.2) 1279 (27.1)     Blood 500 800     IV Piggyback 1316.4 730.2     .6 861     Total Intake(mL/kg) 5717 (120.1) 3670.2 (77.8)     Urine (mL/kg/hr) 1775 (1.6) 2520 (2.2)     Drains 28 4     Blood       Chest Tube 540 400     Total Output 2343 2924     Net +3374 +746.2                    Physical Exam  Vitals and nursing note reviewed.   Constitutional:       Appearance: Normal appearance.   HENT:      Mouth/Throat:      Comments: Left neck incision is closed  Neck drain with serous output, without swelling  Cardiovascular:      Rate and Rhythm: Normal rate and regular rhythm.      Pulses: Normal pulses.   Pulmonary:      Effort: Pulmonary effort is normal.      Comments: Right chest tube without air leak with SS drainage  Incisions are clean, dry and intact  Abdominal:      Palpations: Abdomen is soft.      Comments: Midline incision is clean, dry and intact  Musculoskeletal:         General: No swelling.   Skin:     General: Skin is warm.      Capillary Refill: Capillary refill takes less than 2 seconds.   Neurological:      General: No focal deficit present.      Mental Status: She is alert.     Significant Labs:  I have reviewed all pertinent lab results within the past 24 hours.  CBC:   Recent Labs   Lab 11/05/22 0328 11/05/22  0503   WBC 13.33*  --    RBC 2.83*  --    HGB 9.6*  --    HCT 27.3* 26*   PLT 34*  --    MCV 97  --    MCH 33.9*  --    MCHC 35.2  --      BMP:   Recent Labs   Lab 11/05/22 0328   *   *   K 3.7      CO2 19*   BUN 17   CREATININE 0.6   CALCIUM 8.7   MG 2.3       Significant Diagnostics:  I have reviewed all pertinent imaging results/findings within the past 24 hours.

## 2022-11-05 NOTE — PROGRESS NOTES
[facility]  General Surgery  Progress Note    Subjective:     History of Present Illness:  No notes on file    Post-Op Info:  Procedure(s) (LRB):  XI ROBOTIC ESOPHAGECTOMY Converted to open at 1215  (N/A)  LYSIS, ADHESIONS  VATS, WITH WEDGE RESECTION, LUNG (Right)  CHOLECYSTECTOMY  ESOPHAGECTOMY   3 Days Post-Op     Interval History: Urine output now 35/hr. Remains intubated, PEEP 8. WBC stable around 13.3, H/H improved at 9.6/27.3 (from 6.4/18.7), plts 34.    Medications:  Continuous Infusions:   sodium chloride 0.9% Stopped (11/05/22 0513)    sodium chloride 0.9% Stopped (11/05/22 0550)    amiodarone in dextrose 5% 1 mg/min (11/05/22 0600)    fentanyl 150 mcg/hr (11/05/22 0600)    propofoL 50 mcg/kg/min (11/05/22 0636)    TPN ADULT CENTRAL LINE CUSTOM 40 mL/hr at 11/05/22 0600     Scheduled Meds:   enoxaparin  30 mg Subcutaneous Daily    famotidine (PF)  20 mg Intravenous BID    nicotine  1 patch Transdermal Daily    sodium chloride 0.9%  10 mL Intravenous Q6H    sodium phosphate IVPB  15 mmol Intravenous Once     PRN Meds:calcium gluconate IVPB, calcium gluconate IVPB, calcium gluconate IVPB, fentaNYL, HYDROmorphone, magnesium sulfate IVPB, magnesium sulfate IVPB, naloxone, ondansetron, potassium chloride in water **AND** potassium chloride in water **AND** potassium chloride in water, sodium chloride 0.9%, sodium chloride 0.9%, Flushing PICC Protocol **AND** sodium chloride 0.9% **AND** sodium chloride 0.9%     Review of patient's allergies indicates:   Allergen Reactions    Shellfish containing products Nausea And Vomiting     All seafood    Topamax [topiramate] Nausea And Vomiting and Other (See Comments)     Vertigo       Objective:     Vital Signs (Most Recent):  Temp: 98.5 °F (36.9 °C) (11/05/22 0315)  Pulse: 71 (11/05/22 0645)  Resp: 12 (11/05/22 0645)  BP: (!) 127/53 (11/04/22 1652)  SpO2: (!) 92 % (11/05/22 0645)   Vital Signs (24h Range):  Temp:  [98.3 °F (36.8 °C)-100.1 °F (37.8  °C)] 98.5 °F (36.9 °C)  Pulse:  [] 71  Resp:  [9-29] 12  SpO2:  [90 %-100 %] 92 %  BP: (113-160)/(53-68) 127/53  Arterial Line BP: ()/(33-86) 128/78     Weight: 58.3 kg (128 lb 8.5 oz)  Body mass index is 22.06 kg/m².    Intake/Output - Last 3 Shifts         11/03 0700 11/04 0659 11/04 0700 11/05 0659 11/05 0700 11/06 0659    I.V. (mL/kg) 3721 (78.2) 1279 (27.1)     Blood 500 800     IV Piggyback 1316.4 730.2     .6 861     Total Intake(mL/kg) 5717 (120.1) 3670.2 (77.8)     Urine (mL/kg/hr) 1775 (1.6) 2520 (2.2)     Drains 28 4     Blood       Chest Tube 540 400     Total Output 2343 2924     Net +3374 +746.2                    Physical Exam  Vitals and nursing note reviewed.   Constitutional:       Appearance: Normal appearance.   HENT:      Mouth/Throat:      Comments: Left neck incision is closed  Neck drain with serous output, without swelling  Cardiovascular:      Rate and Rhythm: Normal rate and regular rhythm.      Pulses: Normal pulses.   Pulmonary:      Effort: Pulmonary effort is normal.      Comments: Right chest tube without air leak with SS drainage  Incisions are clean, dry and intact  Abdominal:      Palpations: Abdomen is soft.      Comments: Midline incision is clean, dry and intact  Musculoskeletal:         General: No swelling.   Skin:     General: Skin is warm.      Capillary Refill: Capillary refill takes less than 2 seconds.   Neurological:      General: No focal deficit present.      Mental Status: She is alert.     Significant Labs:  I have reviewed all pertinent lab results within the past 24 hours.  CBC:   Recent Labs   Lab 11/05/22 0328 11/05/22  0503   WBC 13.33*  --    RBC 2.83*  --    HGB 9.6*  --    HCT 27.3* 26*   PLT 34*  --    MCV 97  --    MCH 33.9*  --    MCHC 35.2  --      BMP:   Recent Labs   Lab 11/05/22 0328   *   *   K 3.7      CO2 19*   BUN 17   CREATININE 0.6   CALCIUM 8.7   MG 2.3       Significant Diagnostics:  I have reviewed  all pertinent imaging results/findings within the past 24 hours.    Assessment/Plan:     * Squamous cell esophageal cancer  Angelita Camara is a 61 y.o. female with squamous cell esophageal cancer s/p [place] esophagectomy on 11/2/22 now POD3    - PRN pain meds  - NPO (occasional mouth swabs are OK)  - Continue TPN  - Continue amiodarone gtt  - Wean vent settings as able  - 20mg Lasix q6 today with labs q6 as well  - push hard to extubate  - Replete lytes PRN  - Keep chest tube to suction  - Keep cervical ARMINDA drain to suction  - DVT ppx  - PT/OT once extubated    - Dispo: Continue SICU care            Kim Crespo MD  General Surgery  [facility]

## 2022-11-05 NOTE — NURSING
Dr. Ruiz aware and came to bedside. Pt per tele. Is having pauses, rhythm ranging from A fib 120s to sinus rhythm/ sinus gee 40s-70s, ECGs were obtained reflecting these changes see. See. Chart for more details.

## 2022-11-05 NOTE — ASSESSMENT & PLAN NOTE
Angelita Camara is a 61 y.o. female with squamous cell esophageal cancer s/p [place] esophagectomy on 11/2/22 now POD3    - PRN pain meds  - NPO (occasional mouth swabs are OK)  - Continue TPN  - Continue amiodarone gtt  - Wean vent settings as able  - 20mg Lasix today for diuresis  - Replete lytes PRN  - Keep chest tube to suction  - Keep cervical ARMINDA drain to suction  - DVT ppx  - PT/OT once extubated    - Dispo: Continue SICU care

## 2022-11-05 NOTE — NURSING
Called SICU on call Dr. Ruiz, pt Sinus gee 51, dropped to SB 47, BP 88/53 MAP 66 on INBP, A line not working properly. Pt seen at bedside by Dr. Ruiz. Orders in/changed. Sedation decreased at this time.

## 2022-11-05 NOTE — SUBJECTIVE & OBJECTIVE
Interval History/Significant Events: Previous day events complicated by afib with RVR, eventually well controlled after amiodarone infusion and metoprolol pushes. Pt also becoming tachypnic overnight, with subsequent alkalosis showing on serial ABGs. Started on fentanyl infusion overnight to help suppress respiratory drive, alkalosis corrected with slowing of respiratory rate    Follow-up For: Procedure(s) (LRB):  XI ROBOTIC ESOPHAGECTOMY Converted to open at 1215  (N/A)  LYSIS, ADHESIONS  VATS, WITH WEDGE RESECTION, LUNG (Right)  CHOLECYSTECTOMY  ESOPHAGECTOMY    Post-Operative Day: 3 Days Post-Op    Objective:     Vital Signs (Most Recent):  Temp: 98.5 °F (36.9 °C) (11/05/22 0315)  Pulse: 70 (11/05/22 0615)  Resp: 14 (11/05/22 0615)  BP: (!) 127/53 (11/04/22 1652)  SpO2: (!) 90 % (11/05/22 0615)   Vital Signs (24h Range):  Temp:  [97.8 °F (36.6 °C)-100.1 °F (37.8 °C)] 98.5 °F (36.9 °C)  Pulse:  [] 70  Resp:  [9-29] 14  SpO2:  [90 %-100 %] 90 %  BP: (113-160)/(53-68) 127/53  Arterial Line BP: ()/(33-86) 132/59     Weight: 47.2 kg (104 lb)  Body mass index is 17.85 kg/m².      Intake/Output Summary (Last 24 hours) at 11/5/2022 0638  Last data filed at 11/5/2022 0600  Gross per 24 hour   Intake 3670.17 ml   Output 2924 ml   Net 746.17 ml       Physical Exam  Vitals reviewed.   Constitutional:       Appearance: She is ill-appearing.   HENT:      Mouth/Throat:      Comments: ETT in place   Neck:      Comments: Left neck incision CDI  ARMINDA drain with minimal fluid.  Cardiovascular:      Rate and Rhythm: Normal rate and regular rhythm.      Pulses: Normal pulses.      Heart sounds: Normal heart sounds.   Pulmonary:      Effort: Pulmonary effort is normal.      Breath sounds: Normal breath sounds.   Chest:      Comments: Right tunneled portacath  CT in place on suction with SS output.  Abdominal:      General: Abdomen is flat. Bowel sounds are normal.      Palpations: Abdomen is soft.      Comments: Incisions  CDI   Genitourinary:     Comments: Gregory in place  Skin:     General: Skin is warm.      Capillary Refill: Capillary refill takes less than 2 seconds.       Vents:  Vent Mode: A/C (11/05/22 0522)  Set Rate: 20 BPM (11/05/22 0522)  Vt Set: 470 mL (11/05/22 0522)  PEEP/CPAP: 8 cmH20 (11/05/22 0522)  Oxygen Concentration (%): 75 (11/05/22 0615)  Peak Airway Pressure: 26 cmH2O (11/05/22 0522)  Plateau Pressure: 0 cmH20 (11/05/22 0522)  Total Ve: 9.37 L/m (11/05/22 0522)  Negative Inspiratory Force (cm H2O): 0 (11/05/22 0522)  F/VT Ratio<105 (RSBI): 168.32 (11/05/22 0522)    Lines/Drains/Airways       Peripherally Inserted Central Catheter Line  Duration             PICC Triple Lumen 11/04/22 1120 right brachial <1 day              Central Venous Catheter Line  Duration             Port A Cath Single Lumen 11/03/22 2300 right subclavian 1 day              Drain  Duration                  Chest Tube 11/02/22 Right Midaxillary 24 Fr. 3 days         Closed/Suction Drain 11/02/22 1630 Left;Superior Chest Bulb 19 Fr. 2 days         Urethral Catheter 11/03/22 2208 Straight-tip 16 Fr. 1 day              Airway  Duration                  Airway - Non-Surgical 11/04/22 0209 Endotracheal Tube 1 day       Airway Anesthesia 11/04/22 1 day              Arterial Line  Duration             Arterial Line 11/04/22 0237 Left Radial 1 day              Peripheral Intravenous Line  Duration                  Peripheral IV - Single Lumen 11/03/22 2209 20 G Left Antecubital 1 day         Peripheral IV - Single Lumen 11/04/22 0240 20 G Left Forearm 1 day         Peripheral IV - Single Lumen 11/04/22 0240 20 G Left Wrist 1 day         Peripheral IV - Single Lumen 11/04/22 0240 20 G Right Forearm 1 day                    Significant Labs:    CBC/Anemia Profile:  Recent Labs   Lab 11/03/22  1325 11/03/22  2223 11/04/22  0730 11/04/22  1430 11/04/22  1854 11/05/22  0025 11/05/22  0328 11/05/22  0503   WBC 14.35*   < > 10.03 13.80*  --   --  13.33*   --    HGB 8.3*   < > 6.2* 10.8*  --   --  9.6*  --    HCT 25.0*   < > 18.8* 30.4*   < > 23* 27.3* 26*   PLT 58*   < > 34* 38*  --   --  34*  --    *   < > 104* 96  --   --  97  --    RDW SEE COMMENT   < > SEE COMMENT 21.7*  --   --  22.6*  --    RETIC 1.9  --   --   --   --   --   --   --    FOLATE 9.2  --   --   --   --   --   --   --    [id number] >2000*  --   --   --   --   --   --   --     < > = values in this interval not displayed.        Chemistries:  Recent Labs   Lab 11/03/22  2223 11/04/22  0446 11/04/22  1540 11/05/22  0328    136 138 134*   K 3.7 4.1 3.5 3.7    108 108 106   CO2 18* 20* 20* 19*   BUN 16 15 14 17   CREATININE 0.6 0.7 0.6 0.6   CALCIUM 8.5* 8.0* 8.7 8.7   ALBUMIN 2.8* 3.1*  --  2.9*   PROT 5.7* 5.1*  --  5.4*   BILITOT 0.5 0.5  --  0.9   ALKPHOS 64 48*  --  65   ALT 21 15  --  14   AST 42* 35  --  36   MG 1.7 2.1 1.9 2.3   PHOS  --  1.8* 1.2* 2.7       All pertinent labs within the past 24 hours have been reviewed.    Significant Imaging:  I have reviewed all pertinent imaging results/findings within the past 24 hours.

## 2022-11-05 NOTE — PROGRESS NOTES
[facility]  Critical Care - Surgery  Progress Note    Patient Name: Angelita Camara  MRN: [id number]  Admission Date: 11/2/2022  Hospital Length of Stay: 3 days  Code Status: Full Code  Attending Provider: Melo Schafer MD  Primary Care Provider: Yaakov Dan II, MD   Principal Problem: Squamous cell esophageal cancer    Subjective:     Hospital/ICU Course:  60yo F with PMH HTN, PUD, current smoker, perforated gastric ulcer s/p Richard patch, and SCC of the esophagus now s/p robotic assisted esophagectomy with open abdominal portion on 11/2. Patient does not have enteral access. Robotic assisted esophagectomy with open abdominal portion on 11/2 - patient admitted directly to SICU after OR.       Interval History/Significant Events: Previous day events complicated by afib with RVR, eventually well controlled after amiodarone infusion and metoprolol pushes. Pt also becoming tachypnic overnight, with subsequent alkalosis showing on serial ABGs. Started on fentanyl infusion overnight to help suppress respiratory drive, alkalosis corrected with slowing of respiratory rate    Follow-up For: Procedure(s) (LRB):  XI ROBOTIC ESOPHAGECTOMY Converted to open at 1215  (N/A)  LYSIS, ADHESIONS  VATS, WITH WEDGE RESECTION, LUNG (Right)  CHOLECYSTECTOMY  ESOPHAGECTOMY    Post-Operative Day: 3 Days Post-Op    Objective:     Vital Signs (Most Recent):  Temp: 98.5 °F (36.9 °C) (11/05/22 0315)  Pulse: 70 (11/05/22 0615)  Resp: 14 (11/05/22 0615)  BP: (!) 127/53 (11/04/22 1652)  SpO2: (!) 90 % (11/05/22 0615)   Vital Signs (24h Range):  Temp:  [97.8 °F (36.6 °C)-100.1 °F (37.8 °C)] 98.5 °F (36.9 °C)  Pulse:  [] 70  Resp:  [9-29] 14  SpO2:  [90 %-100 %] 90 %  BP: (113-160)/(53-68) 127/53  Arterial Line BP: ()/(33-86) 132/59     Weight: 47.2 kg (104 lb)  Body mass index is 17.85 kg/m².      Intake/Output Summary (Last 24 hours) at 11/5/2022 0638  Last data filed at 11/5/2022 0600  Gross per 24 hour    Intake 3670.17 ml   Output 2924 ml   Net 746.17 ml       Physical Exam  Vitals reviewed.   Constitutional:       Appearance: She is ill-appearing.   HENT:      Mouth/Throat:      Comments: ETT in place   Neck:      Comments: Left neck incision CDI  ARMINDA drain with minimal fluid.  Cardiovascular:      Rate and Rhythm: Normal rate and regular rhythm.      Pulses: Normal pulses.      Heart sounds: Normal heart sounds.   Pulmonary:      Effort: Pulmonary effort is normal.      Breath sounds: Normal breath sounds.   Chest:      Comments: Right tunneled portacath  CT in place on suction with SS output.  Abdominal:      General: Abdomen is flat. Bowel sounds are normal.      Palpations: Abdomen is soft.      Comments: Incisions CDI   Genitourinary:     Comments: Gregory in place  Skin:     General: Skin is warm.      Capillary Refill: Capillary refill takes less than 2 seconds.       Vents:  Vent Mode: A/C (11/05/22 0522)  Set Rate: 20 BPM (11/05/22 0522)  Vt Set: 470 mL (11/05/22 0522)  PEEP/CPAP: 8 cmH20 (11/05/22 0522)  Oxygen Concentration (%): 75 (11/05/22 0615)  Peak Airway Pressure: 26 cmH2O (11/05/22 0522)  Plateau Pressure: 0 cmH20 (11/05/22 0522)  Total Ve: 9.37 L/m (11/05/22 0522)  Negative Inspiratory Force (cm H2O): 0 (11/05/22 0522)  F/VT Ratio<105 (RSBI): 168.32 (11/05/22 0522)    Lines/Drains/Airways       Peripherally Inserted Central Catheter Line  Duration             PICC Triple Lumen 11/04/22 1120 right brachial <1 day              Central Venous Catheter Line  Duration             Port A Cath Single Lumen 11/03/22 2300 right subclavian 1 day              Drain  Duration                  Chest Tube 11/02/22 Right Midaxillary 24 Fr. 3 days         Closed/Suction Drain 11/02/22 1630 Left;Superior Chest Bulb 19 Fr. 2 days         Urethral Catheter 11/03/22 2208 Straight-tip 16 Fr. 1 day              Airway  Duration                  Airway - Non-Surgical 11/04/22 0209 Endotracheal Tube 1 day       Airway  Anesthesia 11/04/22 1 day              Arterial Line  Duration             Arterial Line 11/04/22 0237 Left Radial 1 day              Peripheral Intravenous Line  Duration                  Peripheral IV - Single Lumen 11/03/22 2209 20 G Left Antecubital 1 day         Peripheral IV - Single Lumen 11/04/22 0240 20 G Left Forearm 1 day         Peripheral IV - Single Lumen 11/04/22 0240 20 G Left Wrist 1 day         Peripheral IV - Single Lumen 11/04/22 0240 20 G Right Forearm 1 day                    Significant Labs:    CBC/Anemia Profile:  Recent Labs   Lab 11/03/22  1325 11/03/22  2223 11/04/22  0730 11/04/22  1430 11/04/22  1854 11/05/22  0025 11/05/22  0328 11/05/22  0503   WBC 14.35*   < > 10.03 13.80*  --   --  13.33*  --    HGB 8.3*   < > 6.2* 10.8*  --   --  9.6*  --    HCT 25.0*   < > 18.8* 30.4*   < > 23* 27.3* 26*   PLT 58*   < > 34* 38*  --   --  34*  --    *   < > 104* 96  --   --  97  --    RDW SEE COMMENT   < > SEE COMMENT 21.7*  --   --  22.6*  --    RETIC 1.9  --   --   --   --   --   --   --    FOLATE 9.2  --   --   --   --   --   --   --    [id number] >2000*  --   --   --   --   --   --   --     < > = values in this interval not displayed.        Chemistries:  Recent Labs   Lab 11/03/22  2223 11/04/22  0446 11/04/22  1540 11/05/22  0328    136 138 134*   K 3.7 4.1 3.5 3.7    108 108 106   CO2 18* 20* 20* 19*   BUN 16 15 14 17   CREATININE 0.6 0.7 0.6 0.6   CALCIUM 8.5* 8.0* 8.7 8.7   ALBUMIN 2.8* 3.1*  --  2.9*   PROT 5.7* 5.1*  --  5.4*   BILITOT 0.5 0.5  --  0.9   ALKPHOS 64 48*  --  65   ALT 21 15  --  14   AST 42* 35  --  36   MG 1.7 2.1 1.9 2.3   PHOS  --  1.8* 1.2* 2.7       All pertinent labs within the past 24 hours have been reviewed.    Significant Imaging:  I have reviewed all pertinent imaging results/findings within the past 24 hours.    Assessment/Plan:     * Squamous cell esophageal cancer    Neuro/Psych:   -- Sedation: 50 propofol, fentanyl infusion added on as pt  was becoming tachypnic   -- Pain: Dilaudid PRN / Fentanyl              Cards:   -- HDS  -- BNP 1147  -- Cards consulted for echo given low CI on flow track. Bedside echo demonstrated adequate contraction of all chambers without major abnormalities. Suspicion for cardiogenic pathology is low.   #A.fib  -- a.fib with RVR: pt started back on amiodarone infusion yesterday. Pt bolused with dilt and metoprolol and got rate control. Amiodarone infusion continues, wean down to 0.5  -- continue aggressive diuresis today       Pulm:   -- Vent A/C 7/100 - satting 90-93.  -- Wean PEEP as possible   -- Current smoker 1/2 PPD, COPD  -- Nicotine patch  -- Goal O2 sat > 90%  #CXR  -- Worsening bilateral pattern of opacity - SIRS vs. Edema vs ARDS  -- fentanyl infusion started as pt was becoming tachypnic and developing resp alkalosis. Continue trending ABGs      Renal:  -- Villegas taken out yesterday however patient did not void all day, reinserted in the evening with initial output of 1L.   -- Stop mIV  -- BUN/Cr 17/0.6  -- strict I/Os  -- continue IV diuresis today       FEN / GI:   -- Replace lytes as needed  -- Nutrition: NPO  -- Nutrition consult placed, rec's appreciated  -- TPN continued      ID:   -- Tm: afebrile; WBC 13.33  -- Abx none      Heme/Onc:   -- H/H stable 9.6/27.3  -- Daily CBC  #Thrombocytopenia  -- Received 1 unit of platelet 11/2.  -- Heme/Onc consulted for possible ITP. Possible start of IVIG and steroids if plt count drops below 30k. Continue following heme/onc daily recs      Endo:   -- Gluc goal 140-180  -- No hx DM      PPx:   Feeding: NPO, TPN  Analgesia/Sedation: See above  Thromboembolic prevention: lovenox   HOB >30: Yes  Stress Ulcer ppx: famotidine  Glucose control: Critical care goal 140-180 g/dl    Lines/Drains/Airway: Right CT, ETT, villegas, right radial a-line, neck ARMINDA.       Dispo/Code Status/Palliative:   -- Continue SICU care / Full Code  -- Discussed with staff Dr. Smith, full  attestation to follow         Critical Care Daily Checklist:    A: Awake: RASS Goal/Actual Goal: RASS Goal: -2-->light sedation  Actual: Le Agitation Sedation Scale (RASS): Light sedation   B: Spontaneous Breathing Trial Performed?     C: SAT & SBT Coordinated?  Pt not ready                   D: Delirium: CAM-ICU Overall CAM-ICU: Negative   E: Early Mobility Performed? No   F: Feeding Goal: Goals: Meet % EEN by next RD f/u  Status: Nutrition Goal Status: new   Current Diet Order   Procedures    Diet NPO      AS: Analgesia/Sedation Fentanyl and propofol   T: Thromboembolic Prophylaxis Lovenox   H: HOB > 300 Yes   U: Stress Ulcer Prophylaxis (if needed) Pepcid   G: Glucose Control Adequate   B: Bowel Function     I: Indwelling Catheter (Lines & Villegas) Necessity Right CT, ETT, villegas, right radial a-line, neck ARMINDA, right sided PICC line     D: De-escalation of Antimicrobials/Pharmacotherapies None    Plan for the day/ETD Continue SICU care     Code Status:  Family/Goals of Care: Full Code         Critical secondary to Patient has a condition that poses threat to life and bodily function: Severe Respiratory Distress     Critical care was time spent personally by me on the following activities: development of treatment plan with patient or surrogate and bedside caregivers, discussions with consultants, evaluation of patient's response to treatment, examination of patient, ordering and performing treatments and interventions, ordering and review of laboratory studies, ordering and review of radiographic studies, pulse oximetry, re-evaluation of patient's condition.  This critical care time did not overlap with that of any other provider or involve time for any procedures.     Maria De Jesus Farrell MD  Critical Care - Surgery  [facility] - [facility]

## 2022-11-05 NOTE — NURSING
Pt's HR , BP 90/53, MAP 66, made Dr. Ruiz aware of HR / gee/tachy, HR will drop to 45 then increase to 120s appears to be A Fib, MAP remains above 65, SATs 95-97% no resp or ventilation issues noted, urine output increased from last 2 hour had 200 ml at 1200 and 325ml at 1300 d/t 20 Lasix IV given around 1145. See orders/MAR for changes.

## 2022-11-06 NOTE — CONSULTS
Please see cardiology consult and EP consult notes from overnight.     Will continue to follow.    Jamari Rosales MD  Cardiology Fellow  Pager: [phone]

## 2022-11-06 NOTE — CONSULTS
EP CONSULT       Referring Physician: Melo Schafer MD      Reason for Consult:  A FIB       PMH:   HTN, current smoker  PUD,  perforated gastric ulcer s/p Richard patch  SCC of the esophagus now s/p robotic assisted esophagectomy with open abdominal portion on       HPI:  This is a 61 y.o. female with a PMH as above who presented for planned esophagectomy, completed on 22. Post op she was moved to SICU. Hospital course complicated by AF with RVR, on amio. Also acute hypoxic respiratory failure requiring intubation. CXR with diffuse infiltrates bilaterally. On morning of , patient developed worsening hypotension requiring pressors. Flow track with low cardiac output. Cardiology consulted given concern for cardiogenic shock. Bedside echo with hyperdynamic EF, SVO2 from port was 60, EF was preserved. Patient had no evidence of cardiogenic shock, no interventions needed. Today, the patient presented with AF RVR, was started on amiodarone drip at 1.5.; The patient developed junctional rhythm rate 40's and sinus pauses up to 4 seconds. Cradiology was consulted -Dr Valentin, who recommended EP. Amiodarone, and propofol was stopped. Patient went back into AF RVR rate of 130's. EP consulted for further recommendations      ROS:  Unable to assess. Pt is currently on MV.       Past Medical History:   Diagnosis Date    Bicytopenia 2022    Cancer     Chronic back pain     H/O: UGI bleed     Hypertension     PUD (peptic ulcer disease)     Spondylolisthesis at L5-S1 level      Past Surgical History:   Procedure Laterality Date    BREAST BIOPSY Right     Benign    CATARACT EXTRACTION, BILATERAL  2018, 2018    CERVICAL SPINE SURGERY       SECTION      CHOLECYSTECTOMY  2022    Procedure: CHOLECYSTECTOMY;  Surgeon: Melo Schafer MD;  Location: [facility] OR [address] [place] [address];  Service: General;;    COLONOSCOPY  2019    Dr Case    ENDOSCOPIC NASAL CAUTERIZATION  Bilateral 8/30/2022    Procedure: CAUTERIZATION, NOSE, ENDOSCOPIC;  Surgeon: Fabiano Cisneros MD;  Location: [facility] OR;  Service: ENT;  Laterality: Bilateral;    ENDOSCOPIC ULTRASOUND OF UPPER GASTROINTESTINAL TRACT Left 6/21/2022    Procedure: ULTRASOUND, UPPER GI TRACT, ENDOSCOPIC;  Surgeon: Matt Alonso MD;  Location: [facility] ENDO;  Service: Endoscopy;  Laterality: Left;    ESOPHAGECTOMY  11/2/2022    Procedure: ESOPHAGECTOMY;  Surgeon: Melo Schafer MD;  [facility];  Service: General;;  Total thoracic esophagectomy, proximal stomach, and mediastinal abdominal lymph node excision    ESOPHAGOGASTRODUODENOSCOPY N/A 6/21/2022    Procedure: EGD (ESOPHAGOGASTRODUODENOSCOPY);  Surgeon: Matt Alonso MD;  Location: [facility] ENDO;  Service: Endoscopy;  Laterality: N/A;    HYSTERECTOMY  2011    total    INSERTION OF TUNNELED CENTRAL VENOUS CATHETER (CVC) WITH SUBCUTANEOUS PORT Right 6/23/2022    Procedure: [id number];  Surgeon: Angel Cedeno MD;  Location: [facility];  Service: General;  Laterality: Right;  Right Subclavian    LYSIS OF ADHESIONS  11/2/2022    Procedure: LYSIS, ADHESIONS;  Surgeon: Melo Schafer MD;  [facility];  Service: General;;    OOPHORECTOMY      PEPTIC ULCER REMOVAL      ROBOT-ASSISTED SURGICAL REMOVAL OF ESOPHAGUS USING DA JANINE XI N/A 11/2/2022    Procedure: XI ROBOTIC ESOPHAGECTOMY Converted to open at 1215 ;  Surgeon: Melo Schafer MD;  [address];  Service: General;  Laterality: N/A;    SPINAL FUSION      THORACOSCOPIC WEDGE RESECTION OF LUNG Right 11/2/2022    Procedure: VATS, WITH WEDGE RESECTION, LUNG;  Surgeon: Melo Schafer MD;  Location: [facility] OR [address] [place] [address];  Service: General;  Laterality: Right;     Family History   Problem Relation Age of Onset    Heart disease Mother     Heart disease Father     Diabetes Father     Cancer Maternal Grandmother      Social History     Tobacco Use    Smoking status: Every Day      Packs/day: 0.50     Types: Cigarettes     Start date: 1977    Smokeless tobacco: Never   Substance Use Topics    Alcohol use: Yes     Comment: occasional    Drug use: No     Review of patient's allergies indicates:   Allergen Reactions    Shellfish containing products Nausea And Vomiting     All seafood    Topamax [topiramate] Nausea And Vomiting and Other (See Comments)     Vertigo        enoxaparin  30 mg Subcutaneous Daily    famotidine (PF)  20 mg Intravenous BID    furosemide (LASIX) injection  20 mg Intravenous Q6H    mupirocin   Nasal BID    nicotine  1 patch Transdermal Daily    sodium chloride 0.9%  10 mL Intravenous Q6H     No current facility-administered medications on file prior to encounter.     Current Outpatient Medications on File Prior to Encounter   Medication Sig Dispense Refill    amiodarone (PACERONE) 200 MG Tab Take 1 tablet (200 mg total) by mouth once daily. 30 tablet 1    HYDROcodone-acetaminophen (NORCO) 7.5-325 mg per tablet Take 1 tablet by mouth every 6 (six) hours as needed for Pain.      losartan (COZAAR) 100 MG tablet Take 1 tablet (100 mg total) by mouth once daily. 30 tablet 1    multivitamin (THERAGRAN) per tablet Take 1 tablet by mouth once daily.      ondansetron (ZOFRAN-ODT) 8 MG TbDL Take 8 mg by mouth daily as needed (nausea/vomiting).      prochlorperazine (COMPAZINE) 10 MG tablet Take 10 mg by mouth nightly as needed (nausea/vomiting).      [DISCONTINUED] losartan-hydrochlorothiazide 100-25 mg (HYZAAR) 100-25 mg per tablet Take 1 tablet by mouth once daily. 90 tablet 3       Continuous Infusions:   sodium chloride 0.9% 1 mL/hr at 11/06/22 0200    sodium chloride 0.9% 50 mL/hr at 11/06/22 0200    amiodarone in dextrose 5% Stopped (11/05/22 2101)    esmolol Stopped (11/06/22 0102)    fentanyl 175 mcg/hr (11/06/22 0200)    propofoL 20 mcg/kg/min (11/06/22 0200)    TPN ADULT CENTRAL LINE CUSTOM 40 mL/hr at 11/06/22 0200       Scheduled Meds:   enoxaparin  30 mg Subcutaneous  Daily    famotidine (PF)  20 mg Intravenous BID    furosemide (LASIX) injection  20 mg Intravenous Q6H    mupirocin   Nasal BID    nicotine  1 patch Transdermal Daily    sodium chloride 0.9%  10 mL Intravenous Q6H     PRN Meds:calcium gluconate IVPB, calcium gluconate IVPB, calcium gluconate IVPB, fentaNYL, HYDROmorphone, levalbuterol, magnesium sulfate IVPB, magnesium sulfate IVPB, naloxone, ondansetron, potassium chloride in water **AND** potassium chloride in water **AND** potassium chloride in water, sodium chloride 0.9%, sodium chloride 0.9%, Flushing PICC Protocol **AND** sodium chloride 0.9% **AND** sodium chloride 0.9%      OBJECTIVE:  Vitals:    11/06/22 0115 11/06/22 0130 11/06/22 0145 11/06/22 0200   BP:   (!) 120/54    Pulse: 93 97 102 102   Resp: 20      Temp:       TempSrc:       SpO2: 98% 98% 98% 98%   Weight:       Height:         Gen: Intubated   HEENT: Supple, no JVD  Cvs: irregular rate and rhythm  Resp: on MV.   Abd: Soft, non-tender and not distended  Ext: Warm, no edema.       LABS:  Recent Labs   Lab 11/05/22  1143 11/05/22  1822 11/06/22  0053    140 139   K 4.2 3.5 4.0    107 107   CO2 25 25 24   BUN 20 23* 24*   CREATININE 0.6 0.6 0.6   * 120* 127*   ANIONGAP 6* 8 8     Recent Labs   Lab 11/05/22  0328 11/05/22  1143 11/06/22  0053   MG 2.3   < > 2.0   PHOS 2.7  --   --     < > = values in this interval not displayed.     Recent Labs   Lab 11/03/22  2223 11/04/22  0446 11/05/22  0328   AST 42* 35 36   ALT 21 15 14   ALKPHOS 64 48* 65   BILITOT 0.5 0.5 0.9   ALBUMIN 2.8* 3.1* 2.9*      Recent Labs   Lab 11/04/22  0730 11/04/22  1430 11/04/22  1854 11/05/22  0328 11/05/22  0503   WBC 10.03 13.80*  --  13.33*  --    HGB 6.2* 10.8*  --  9.6*  --    HCT 18.8* 30.4*   < > 27.3* 26*   PLT 34* 38*  --  34*  --    GRAN 92.7*  9.3* 94.9*  13.1*  --  95.3*  12.7*  --     < > = values in this interval not displayed.     Recent Labs   Lab 11/02/22  1746 11/04/22  0446   INR 1.1 1.2      Recent Labs   Lab 22  0446   BNP 1,147*          IMAGIN22 EKG: Junctional scape rhythm rate 47        22 TTE:  The left ventricle is normal in size with eccentric hypertrophy and normal systolic function. The estimated ejection fraction is 65%.  Normal right ventricular size with normal right ventricular systolic function.  Normal left ventricular diastolic function.  Severe left atrial enlargement.  Mild tricuspid regurgitation.  Mild aortic regurgitation.  Mechanically ventilated; cannot use inferior caval vein diameter to estimate central venous pressure.  EF   Date Value Ref Range Status   2022 65 % Final   2022 65 % Final       Prior Ischemic Evaluation:  19 Stress test:   The perfusion scan is free of evidence from myocardial ischemia or injury.  There is a mild intensity fixed defect in the anterolateral wall of the left ventricle, secondary to soft tissue attenuation.  Visually estimated LV function is normal.  The EKG portion of this study is negative for myocardial ischemia.  There is no prior study available for comparison.        PLAN:  Started on esmolol for AF 'S. However team called cardiology again for pause. HR now has decreased to 110.  Discussed again with EP. Suggested esmolol if HR >120       Axel Sarmienot M.D.  Page # [phone]  Cardiovascular Fellow  [facility]

## 2022-11-06 NOTE — ASSESSMENT & PLAN NOTE
61 y.o. female with esophagectomy on 11/2/22. Post op she was moved to SICU. Hospital course complicated by AF with RVR, on amio. Also acute hypoxic respiratory failure due to ARDS requiring intubation. On morning of 11/4, patient developed worsening hypotension requiring pressors. Flow track with low cardiac output. Cardiology consulted given concern for cardiogenic shock. Bedside echo with hyperdynamic EF, SVO2 from port was 60, EF was preserved. Patient had no evidence of cardiogenic shock, no interventions needed. 11/5 the patient continues to have intermittent AF RVR, was restarted on amiodarone drip at 1.5. The patient developed junctional rhythm rate 40's and sinus pauses up to 4-6 seconds. Cardiology was consulted and recommended esmolol gtt.    Plan  Wean propofol as able (ARDS will likely require propofol)  Prefer fentanyl for bradycardia  Narrow complex   Does not need a TVP at this time.   Platelets 28  Will re-evaluate for a pacemaker once acute illness improved  Change esmolol gtt for goal HR <120. Wean as able. Goal to keep HR from 140s or causing hypotension requiring vasopressors   Primary team limiting pressors given recent surgery concerns for healing/blood flow  Limit amiodarone off anticoagulation, restart anticoagulation once safely determined by primary team.    Case discussed with EP fellow and Dr. Cisneros. Communicated with surgery team at bedside.

## 2022-11-06 NOTE — EICU
Rounding (Video Assessment):  Yes    Intervention Initiated From:  [facility] / EICU    Osiel Communicated with Bedside Nurse regarding:  Documentation    Nurse Notified:  Yes    Doctor Notified: Dr Jessica Rivas  Yes    Comments: Right groin cleansed with chlora prep.   0920 Sterile blue towels placed around right groin  0925 Using live US inserted needle followed by guide wire, needle removed  0926 Small incision made at entrance. Holding guide wire secure, advanced dilator then removed  0931 Inserted 20 gauze 20 cm. Glide wire removed intact. Connected to pressure cable, leveled and calibrated. Good wave form  0933 Sutured catheter down, biopatch applied at entry site. Covered with tegaderm Tolerated well.

## 2022-11-06 NOTE — ASSESSMENT & PLAN NOTE
  Neuro/Psych:   -- Sedation: propofol gtt, fentanyl gtt   -- Pain: Dilaudid PRN             Cards:   -- Cards consulted for echo given low CI on flow track. Bedside echo demonstrated adequate contraction of all chambers without major abnormalities. Suspicion for cardiogenic pathology is low.   #A.fib  -- a.fib with RVR yesterday: cardiology consulted because patient gee'd down to 28 with pauses in EKG becoming more frequent, amio gtt stopped, patient back in a fib with RVR  -- now on esmolol gtt, but pts Bp continues to be labile. Will discuss with cardiology today for additional recs      Pulm:   -- Vent - satting 90-93.  -- Wean PEEP as possible   -- Current smoker 1/2 PPD, COPD  -- Nicotine patch  -- Goal O2 sat > 90%  #CXR  -- Worsening bilateral pattern of opacity - SIRS vs. Edema, serial CXRs showing minimal improvement    -- optimizing vent settings today, dropping TV as peak pressures are rising        Renal:  -- Gregory with strict I/Os  -- Stop mIV  -- BUN/Cr 15/0.7  -- 40mg IV lasix BID today       FEN / GI:   -- Replace lytes as needed  -- Nutrition: TPN  -- Nutrition consult placed, rec's appreciated        ID:   -- Tm: afebrile; WBC 12.30  -- Abx none      Heme/Onc:   -- H/H stable 9.0/26.5  -- Daily CBC  #Thrombocytopenia  -- Received 1 unit of platelet 11/2.  -- Heme/Onc consulted for possible ITP.   - plt count continues to drop, now down to 16. Pt on decadron 40mg IV. Will discuss with heme/onc about possible IVIG today     Endo:   -- Gluc goal 140-180  -- No hx DM      PPx:   Feeding: TPN  Analgesia/Sedation: See above  Thromboembolic prevention: lovenox   HOB >30: Yes  Stress Ulcer ppx: famotidine  Glucose control: Critical care goal 140-180 g/dl    Lines/Drains/Airway: Right CT, ETT, neck ARMINDA.       Dispo/Code Status/Palliative:   -- continue SICU care  / Full Code

## 2022-11-06 NOTE — PLAN OF CARE
Patient on continuous esmolol to maintain HR less than 120. Patient on ventilator with fio2 60% with o2sats 93. Patient on fentanyl and propofol drips to maintain rass -1, since patient not tolerating rass +1 like Dr. Rivas had requested. Patient follows commands when aroused on when drips paused. Patient with  at bedside. Patient and  updated on plan of care for the day per Dr. Rivas.  Dr. Rivas updated on patient's assessments, lab results, abg results, urine output and patient's multiple episodes of pauses in cardiac rhythm. Dr. Rosales notified multiple times post patient's cardiac pauses in rhythm. Dr. Cm and Dr. Rosales at bedside earlier in the shift and discussed plan with Dr. Smith. Will continue to monitor patient.

## 2022-11-06 NOTE — NURSING
Dr. Polanco called for patient update.     RAN entered for additional dose of furosemide 20mg IVP x1, now.     He asked if there was any foul odor from the patient's mouth. There is none.     He's asked for nursing to remind cardiology of the possible need for platelets, should an invasive procedure be required. Markyo!

## 2022-11-06 NOTE — SUBJECTIVE & OBJECTIVE
Interval History: MAP in 50s, getting albumin this AM. Having sinus arrest overnight with some tachyarrhythmia. PEEP increased to 12 Hgb stable, WBC 16.4 from 13.3.    Medications:  Continuous Infusions:   sodium chloride 0.9% 1 mL/hr at 11/06/22 0800    sodium chloride 0.9% 5 mL/hr at 11/06/22 0800    esmolol 50 mcg/kg/min (11/06/22 0800)    fentanyl 200 mcg/hr (11/06/22 0800)    propofoL 20 mcg/kg/min (11/06/22 0800)    TPN ADULT CENTRAL LINE CUSTOM 40 mL/hr at 11/06/22 0800     Scheduled Meds:   enoxaparin  30 mg Subcutaneous Daily    famotidine (PF)  20 mg Intravenous BID    furosemide (LASIX) injection  20 mg Intravenous Q6H    mupirocin   Nasal BID    nicotine  1 patch Transdermal Daily    sodium chloride 0.9%  10 mL Intravenous Q6H     PRN Meds:sodium chloride, calcium gluconate IVPB, calcium gluconate IVPB, calcium gluconate IVPB, fentaNYL, HYDROmorphone, levalbuterol, magnesium sulfate IVPB, magnesium sulfate IVPB, naloxone, ondansetron, potassium chloride in water **AND** potassium chloride in water **AND** potassium chloride in water, sodium chloride 0.9%, sodium chloride 0.9%, Flushing PICC Protocol **AND** sodium chloride 0.9% **AND** sodium chloride 0.9%     Review of patient's allergies indicates:   Allergen Reactions    Shellfish containing products Nausea And Vomiting     All seafood    Topamax [topiramate] Nausea And Vomiting and Other (See Comments)     Vertigo       Objective:     Vital Signs (Most Recent):  Temp: 99.4 °F (37.4 °C) (11/06/22 0730)  Pulse: (!) 115 (11/06/22 0800)  Resp: (!) 24 (11/06/22 0714)  BP: 113/64 (11/06/22 0745)  SpO2: (!) 93 % (11/06/22 0800)   Vital Signs (24h Range):  Temp:  [98.3 °F (36.8 °C)-99.4 °F (37.4 °C)] 99.4 °F (37.4 °C)  Pulse:  [] 115  Resp:  [9-27] 24  SpO2:  [89 %-99 %] 93 %  BP: ()/(40-99) 113/64     Weight: 56.4 kg (124 lb 5.4 oz)  Body mass index is 21.34 kg/m².    Intake/Output - Last 3 Shifts         11/04 0700  11/05 0659 11/05 0700 11/06  0659 11/06 0700  11/07 0659    I.V. (mL/kg) 1279 (27.1) 1430.1 (25.4) 115.2 (2)    Blood 800 211     IV Piggyback 730.2 320.8 200     950.2 80.1    Total Intake(mL/kg) 3670.2 (77.8) 2912.1 (51.6) 395.3 (7)    Urine (mL/kg/hr) 2520 (2.2) 3500 (2.6) 100 (1)    Drains 4 1.5     Chest Tube 400 160 30    Total Output 2924 3661.5 130    Net +746.2 -749.4 +265.3                   Physical Exam  Vitals and nursing note reviewed.   Constitutional:       Appearance: Normal appearance.   HENT:      Mouth/Throat:      Comments: Left neck incision is closed  Neck drain with serous output, without swelling  Cardiovascular:      Rate and Rhythm: Normal rate and regular rhythm.      Pulses: Normal pulses.   Pulmonary:      Effort: Pulmonary effort is normal.      Comments: Right chest tube without air leak with SS drainage  Incisions are clean, dry and intact  Abdominal:      Palpations: Abdomen is soft.      Comments: Midline incision is clean, dry and intact  Musculoskeletal:         General: No swelling.   Skin:     General: Skin is warm.      Capillary Refill: Capillary refill takes less than 2 seconds.   Neurological:      General: No focal deficit present.      Mental Status: She is alert.     Significant Labs:  I have reviewed all pertinent lab results within the past 24 hours.  CBC:   Recent Labs   Lab 11/06/22  0308   WBC 16.41*   RBC 2.97*   HGB 10.0*   HCT 28.8*   PLT 28*   MCV 97   MCH 33.7*   MCHC 34.7     BMP:   Recent Labs   Lab 11/06/22  0308   *      K 4.3      CO2 25   BUN 25*   CREATININE 0.7   CALCIUM 8.6*   MG 1.9       Significant Diagnostics:  I have reviewed all pertinent imaging results/findings within the past 24 hours.

## 2022-11-06 NOTE — ASSESSMENT & PLAN NOTE
Angelita Camara is a 61 y.o. 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

## 2022-11-06 NOTE — NURSING
Nurses Note -- 4 Eyes      11/6/2022   3:19 AM    There is blanchable reddening of the skin about the glute and sacrum.     Sizewise bed ordered    Sacral foam changed.     Strict turning protocol ensured.       Skin assessed during: Daily Assessment      [] No Pressure Injuries Present    [x]Prevention Measures Documented      [] Yes- Altered Skin Integrity Present or Discovered   [] LDA Added if Not in Epic (Describe Wound)   [] New Altered Skin Integrity was Present on Admit and Documented in LDA   [] Wound Image Taken    Wound Care Consulted? No    Attending Nurse:  Josesito Case RN     Second RN/Staff Member:  Kennedi Chambers RN

## 2022-11-06 NOTE — NURSING
Notified Dr. Rosales and Dr. Rivas of patient's pause in cardiac rhythm that lasted a few seconds. Patient with palpable pulse and with map greater than 65. Will continue to monitor patient.

## 2022-11-06 NOTE — EICU
Rounding (Video Assessment):  Yes    Intervention Initiated From:  [facility] / EICU    Osiel Communicated with Bedside Nurse regarding:  Time-Out    Nurse Notified:  Yes    Doctor Notified: Dr. Jessica Rivas  Yes    Comments: Completed time out for emergent arterial radial line insertion  0842 Site cleansed with chlora prep, once dried placed sterile towels around site.  0848 Using live US to locate radial artery, unable to advance into left radial artery. Removed needle pressure held  0855 Right posterior wrist cleansed with chlora prep.  [id number] Difficulty visualizing radial artery, will give IV volume then will resume after fluid bolus.

## 2022-11-06 NOTE — PROGRESS NOTES
Update Dr. Polanco via phone in light of soft MAP after diuresis.       TORB admin up to 500mL Albumin 5% for MAP no less than 65    No pressors without calling first.     -------------------    Prior to receiving Albumin, patient received appxo 200mL platelets. MAP increased >65. Updated Dr. Polanco.     TORB...    hold Adbumin while MAP >65    replete Mg with Mg Sulfate 2g IVPB    hold morning furosemide until team rounds.     -----------------         11/06/22 0415   Vital Signs   BP (!) 76/44   MAP (mmHg) 54

## 2022-11-06 NOTE — NURSING
Dr. Ruiz made aware and seen at bedside d/t pt's frequent pauses, switching from sinus rhythm 60s to A fib in low 100s. See MAR and orders for orders and changes.

## 2022-11-06 NOTE — NURSING
Updated Dr. Soriano of blood pressure. Since midnight, UOP has been approximately 1.1 Liters.       TORB to possible hold 0600 dose of furosemide.      Will follow up as we approach the upcoming administration time.

## 2022-11-06 NOTE — PROGRESS NOTES
Pause in ECG sustained. Monitor reflecting rate 28. Dr. Ruiz, charge and float RN called to bedside. Amiodarone stopped. Multi-function pads placed. Propofol paused, then restarted at lower dose.     ECG rate recovered to junctional rhythm, rate 46.     Cardiology resident at bedside evaluating patient and telemetry. No orders as of the writing of this note. WCTM.        11/05/22 2102   Vital Signs   Pulse (!) 45   Resp 16   Resp Rate Total 20 br/min   SpO2 97 %   Oxygen Concentration (%) 55   BP (!) 92/43   MAP (mmHg) 62

## 2022-11-06 NOTE — NURSING
Dr. Ruiz at bedside. ECG continues to reflect AF with sinus pauses; hemodynamically stable. Cardiology consult to be placed.

## 2022-11-06 NOTE — PT/OT/SLP PROGRESS
Occupational Therapy      Patient Name:  Angelita Camara   MRN:  [id number]    Patient not seen today secondary to Other (Comment) (Pt remains intubated and sedated and remains an evaluation for occupational therapy services.). Will follow-up when medically appropriate.    11/6/2022

## 2022-11-06 NOTE — NURSING
Patient experienced approximately 6 second sinus arrest. This was approximately 28 minutes after beginning an Esmolol infusion at 50 mcg/kg/min    ECG recovered to AF    Esmolol stopped per TORB from Dr. Leiva at the bedside.       Charge and float nurse at bedside. Dr. Thakur is on the unit; will see patient and discuss necessity of TVP.

## 2022-11-06 NOTE — SUBJECTIVE & OBJECTIVE
Interval History/Significant Events: Patient still experiencing sinus arrest overnight with some tachyarrhythmia. Heart rate still up into the 120s. Plt count continuing to drop, now down to 16.     Follow-up For: Procedure(s) (LRB):  XI ROBOTIC ESOPHAGECTOMY Converted to open at 1215  (N/A)  LYSIS, ADHESIONS  VATS, WITH WEDGE RESECTION, LUNG (Right)  CHOLECYSTECTOMY  ESOPHAGECTOMY    Post-Operative Day: 3 Days Post-Op    Objective:     Vital Signs (Most Recent):  Temp: 98.8 °F (37.1 °C) (11/06/22 0500)  Pulse: 110 (11/06/22 0714)  Resp: (!) 24 (11/06/22 0714)  BP: (!) 84/53 (11/06/22 0714)  SpO2: 95 % (11/06/22 0714)   Vital Signs (24h Range):  Temp:  [98.3 °F (36.8 °C)-98.9 °F (37.2 °C)] 98.8 °F (37.1 °C)  Pulse:  [] 110  Resp:  [9-27] 24  SpO2:  [88 %-99 %] 95 %  BP: ()/(40-99) 84/53  Arterial Line BP: ()/(49-59) 128/49     Weight: 56.4 kg (124 lb 5.4 oz)  Body mass index is 21.34 kg/m².      Intake/Output Summary (Last 24 hours) at 11/6/2022 0738  Last data filed at 11/6/2022 0600  Gross per 24 hour   Intake 2628.27 ml   Output 3361.5 ml   Net -733.23 ml       Physical Exam  Vitals reviewed.   Constitutional:       Appearance: She is ill-appearing.   HENT:      Mouth/Throat:      Comments: ETT in place   Neck:      Comments: Left neck incision CDI  ARMINDA drain with minimal fluid.  Cardiovascular:      Rate and Rhythm: Normal rate and regular rhythm.      Pulses: Normal pulses.      Heart sounds: Normal heart sounds.   Pulmonary:      Effort: Pulmonary effort is normal.      Breath sounds: Normal breath sounds.   Chest:      Comments: Right tunneled portacath  CT in place on suction with SS output.  Abdominal:      General: Abdomen is flat. Bowel sounds are normal.      Palpations: Abdomen is soft.      Comments: Incisions CDI   Genitourinary:     Comments: Gregory in place  Skin:     General: Skin is warm.      Capillary Refill: Capillary refill takes less than 2 seconds.     Vents:  Vent Mode: A/C  (11/06/22 0714)  Set Rate: 20 BPM (11/06/22 0714)  Vt Set: 470 mL (11/06/22 0714)  PEEP/CPAP: 12 cmH20 (11/06/22 0714)  Oxygen Concentration (%): 70 (11/06/22 0714)  Peak Airway Pressure: 45 cmH2O (11/06/22 0714)  Plateau Pressure: 0 cmH20 (11/06/22 0714)  Total Ve: 9.96 L/m (11/06/22 0714)  Negative Inspiratory Force (cm H2O): 0 (11/06/22 0714)  F/VT Ratio<105 (RSBI): (!) 44.28 (11/06/22 0714)    Lines/Drains/Airways       Peripherally Inserted Central Catheter Line  Duration             PICC Triple Lumen 11/04/22 1120 right brachial 1 day              Central Venous Catheter Line  Duration             Port A Cath Single Lumen 11/03/22 2300 right subclavian 2 days              Drain  Duration                  Chest Tube 11/02/22 Right Midaxillary 24 Fr. 4 days         Closed/Suction Drain 11/02/22 1630 Left;Superior Chest Bulb 19 Fr. 3 days         Urethral Catheter 11/03/22 2208 Silicone 16 Fr. 2 days              Airway  Duration                  Airway - Non-Surgical 11/04/22 0209 Endotracheal Tube 2 days       Airway Anesthesia 11/04/22 2 days              Peripheral Intravenous Line  Duration                  Peripheral IV - Single Lumen 11/03/22 2209 20 G Left Antecubital 2 days                    Significant Labs:    CBC/Anemia Profile:  Recent Labs   Lab 11/04/22  1430 11/04/22  1854 11/05/22  0328 11/05/22  0503 11/06/22  0308   WBC 13.80*  --  13.33*  --  16.41*   HGB 10.8*  --  9.6*  --  10.0*   HCT 30.4*   < > 27.3* 26* 28.8*   PLT 38*  --  34*  --  28*   MCV 96  --  97  --  97   RDW 21.7*  --  22.6*  --  22.1*    < > = values in this interval not displayed.        Chemistries:  Recent Labs   Lab 11/04/22  1540 11/05/22  0328 11/05/22  1143 11/05/22  1822 11/06/22  0053 11/06/22  0308    134*   < > 140 139 139   K 3.5 3.7   < > 3.5 4.0 4.3    106   < > 107 107 104   CO2 20* 19*   < > 25 24 25   BUN 14 17   < > 23* 24* 25*   CREATININE 0.6 0.6   < > 0.6 0.6 0.7   CALCIUM 8.7 8.7   < > 8.5*  8.4* 8.6*   ALBUMIN  --  2.9*  --   --   --  2.4*   PROT  --  5.4*  --   --   --  5.5*   BILITOT  --  0.9  --   --   --  0.8   ALKPHOS  --  65  --   --   --  100   ALT  --  14  --   --   --  16   AST  --  36  --   --   --  35   MG 1.9 2.3   < > 2.1 2.0 1.9   PHOS 1.2* 2.7  --   --   --  3.3    < > = values in this interval not displayed.       All pertinent labs within the past 24 hours have been reviewed.    Significant Imaging:  I have reviewed all pertinent imaging results/findings within the past 24 hours.

## 2022-11-06 NOTE — PLAN OF CARE
CARDIOLOGY CONSULT        Referring Physician: Melo Schafer MD       Reason for Consult:  A FIB        PMH:   HTN, current smoker  PUD,  perforated gastric ulcer s/p Richard patch  SCC of the esophagus now s/p robotic assisted esophagectomy with open abdominal portion on      HPI:  This is a 61 y.o. female with a PMH as above who presented for planned esophagectomy, completed on 22. Post op she was moved to SICU. Hospital course complicated by AF with RVR, on amio. Also acute hypoxic respiratory failure requiring intubation. CXR with diffuse infiltrates bilaterally. On morning of , patient developed worsening hypotension requiring pressors. Flow track with low cardiac output. Cardiology consulted given concern for cardiogenic shock. Bedside echo with hyperdynamic EF, SVO2 from port was 60, EF was preserved. Patient had no evidence of cardiogenic shock, no interventions needed. Today, the patient presented with AF RVR, was started on amiodarone drip at 1.5.; The patient developed junctional rhythm rate 40's and sinus pauses up to 4 seconds. Cardiology was consulted    ROS:  Unable to assess. Pt is currently on MV.           Past Medical History:   Diagnosis Date    Bicytopenia 2022    Cancer      Chronic back pain      H/O: UGI bleed      Hypertension      PUD (peptic ulcer disease)      Spondylolisthesis at L5-S1 level              Past Surgical History:   Procedure Laterality Date    BREAST BIOPSY Right      Benign    CATARACT EXTRACTION, BILATERAL   2018, 2018    CERVICAL SPINE SURGERY         SECTION        CHOLECYSTECTOMY   2022     Procedure: CHOLECYSTECTOMY;  Surgeon: Melo Schafer MD;  Location: [facility] OR [address] [place] [address];  Service: General;;    COLONOSCOPY   2019     Dr Case    ENDOSCOPIC NASAL CAUTERIZATION Bilateral 2022     Procedure: CAUTERIZATION, NOSE, ENDOSCOPIC;  Surgeon: Fabiano Cisneros MD;  Location: [facility] OR;   Service: ENT;  Laterality: Bilateral;    ENDOSCOPIC ULTRASOUND OF UPPER GASTROINTESTINAL TRACT Left 6/21/2022     Procedure: ULTRASOUND, UPPER GI TRACT, ENDOSCOPIC;  Surgeon: Matt Alonso MD;  Location: [facility] ENDO;  Service: Endoscopy;  Laterality: Left;    ESOPHAGECTOMY   11/2/2022     Procedure: ESOPHAGECTOMY;  Surgeon: Melo Schafer MD;  Location: [facility] OR [address] [place] [address];  Service: General;;  Total thoracic esophagectomy, proximal stomach, and mediastinal abdominal lymph node excision    ESOPHAGOGASTRODUODENOSCOPY N/A 6/21/2022     Procedure: EGD (ESOPHAGOGASTRODUODENOSCOPY);  Surgeon: Matt Alonso MD;  Location: [facility] ENDO;  Service: Endoscopy;  Laterality: N/A;    HYSTERECTOMY   2011     total    INSERTION OF TUNNELED CENTRAL VENOUS CATHETER (CVC) WITH SUBCUTANEOUS PORT Right 6/23/2022     Procedure: [id number];  Surgeon: Angel Cedeno MD;  Location: [facility];  Service: General;  Laterality: Right;  Right Subclavian    LYSIS OF ADHESIONS   11/2/2022     Procedure: LYSIS, ADHESIONS;  Surgeon: Melo Schafer MD;  Location: [address];  Service: General;;    OOPHORECTOMY        PEPTIC ULCER REMOVAL        ROBOT-ASSISTED SURGICAL REMOVAL OF ESOPHAGUS USING DA JANINE XI N/A 11/2/2022     Procedure: XI ROBOTIC ESOPHAGECTOMY Converted to open at 1215 ;  Surgeon: Melo Schafer MD;  Location: [address];  Service: General;  Laterality: N/A;    SPINAL FUSION        THORACOSCOPIC WEDGE RESECTION OF LUNG Right 11/2/2022     Procedure: VATS, WITH WEDGE RESECTION, LUNG;  Surgeon: Melo Schafer MD;  Location: [address];  Service: General;  Laterality: Right;            Family History   Problem Relation Age of Onset    Heart disease Mother      Heart disease Father      Diabetes Father      Cancer Maternal Grandmother        Social History            Tobacco Use    Smoking status: Every Day       Packs/day: 0.50       Types: Cigarettes       Start date: 1977    Smokeless tobacco: Never    Substance Use Topics    Alcohol use: Yes       Comment: occasional    Drug use: No            Review of patient's allergies indicates:   Allergen Reactions    Shellfish containing products Nausea And Vomiting       All seafood    Topamax [topiramate] Nausea And Vomiting and Other (See Comments)       Vertigo          enoxaparin  30 mg Subcutaneous Daily    famotidine (PF)  20 mg Intravenous BID    furosemide (LASIX) injection  20 mg Intravenous Q6H    mupirocin   Nasal BID    nicotine  1 patch Transdermal Daily    sodium chloride 0.9%  10 mL Intravenous Q6H      No current facility-administered medications on file prior to encounter.             Current Outpatient Medications on File Prior to Encounter   Medication Sig Dispense Refill    amiodarone (PACERONE) 200 MG Tab Take 1 tablet (200 mg total) by mouth once daily. 30 tablet 1    HYDROcodone-acetaminophen (NORCO) 7.5-325 mg per tablet Take 1 tablet by mouth every 6 (six) hours as needed for Pain.        losartan (COZAAR) 100 MG tablet Take 1 tablet (100 mg total) by mouth once daily. 30 tablet 1    multivitamin (THERAGRAN) per tablet Take 1 tablet by mouth once daily.        ondansetron (ZOFRAN-ODT) 8 MG TbDL Take 8 mg by mouth daily as needed (nausea/vomiting).        prochlorperazine (COMPAZINE) 10 MG tablet Take 10 mg by mouth nightly as needed (nausea/vomiting).        [DISCONTINUED] losartan-hydrochlorothiazide 100-25 mg (HYZAAR) 100-25 mg per tablet Take 1 tablet by mouth once daily. 90 tablet 3         Continuous Infusions:   sodium chloride 0.9% 1 mL/hr at 11/06/22 0200    sodium chloride 0.9% 50 mL/hr at 11/06/22 0200    amiodarone in dextrose 5% Stopped (11/05/22 2101)    esmolol Stopped (11/06/22 0102)    fentanyl 175 mcg/hr (11/06/22 0200)    propofoL 20 mcg/kg/min (11/06/22 0200)    TPN ADULT CENTRAL LINE CUSTOM 40 mL/hr at 11/06/22 0200         Scheduled Meds:   enoxaparin  30 mg Subcutaneous Daily    famotidine (PF)  20 mg Intravenous BID     furosemide (LASIX) injection  20 mg Intravenous Q6H    mupirocin   Nasal BID    nicotine  1 patch Transdermal Daily    sodium chloride 0.9%  10 mL Intravenous Q6H      PRN Meds:calcium gluconate IVPB, calcium gluconate IVPB, calcium gluconate IVPB, fentaNYL, HYDROmorphone, levalbuterol, magnesium sulfate IVPB, magnesium sulfate IVPB, naloxone, ondansetron, potassium chloride in water **AND** potassium chloride in water **AND** potassium chloride in water, sodium chloride 0.9%, sodium chloride 0.9%, Flushing PICC Protocol **AND** sodium chloride 0.9% **AND** sodium chloride 0.9%     OBJECTIVE:         Vitals:     11/06/22 0115 11/06/22 0130 11/06/22 0145 11/06/22 0200   BP:     (!) 120/54     Pulse: 93 97 102 102   Resp: 20         Temp:           TempSrc:           SpO2: 98% 98% 98% 98%   Weight:           Height:              Gen: Intubated   HEENT: Supple, no JVD  Cvs: irregular rate and rhythm  Resp: on MV.   Abd: Soft, non-tender and not distended  Ext: Warm, no edema.      LABS:         Recent Labs   Lab 11/05/22  1143 11/05/22  1822 11/06/22  0053     140 139    K 4.2 3.5 4.0     107 107    CO2 25 25 24    BUN 20 23* 24*    CREATININE 0.6 0.6 0.6    * 120* 127*    ANIONGAP 6* 8 8              Recent Labs   Lab 11/05/22  0328 11/05/22  1143 11/06/22  0053    MG 2.3   < > 2.0    PHOS 2.7  --   --      < > = values in this interval not displayed.             Recent Labs   Lab 11/03/22  2223 11/04/22  0446 11/05/22  0328    AST 42* 35 36    ALT 21 15 14    ALKPHOS 64 48* 65    BILITOT 0.5 0.5 0.9    ALBUMIN 2.8* 3.1* 2.9*                  Recent Labs   Lab 11/04/22  0730 11/04/22  1430 11/04/22  1854 11/05/22  0328 11/05/22  0503    WBC 10.03 13.80*  --  13.33*  --     HGB 6.2* 10.8*  --  9.6*  --     HCT 18.8* 30.4*   < > 27.3* 26*    PLT 34* 38*  --  34*  --     GRAN 92.7*  9.3* 94.9*  13.1*  --  95.3*  12.7*  --      < > = values in this interval not displayed.            Recent Labs   Lab  22  1746 22  0446    INR 1.1 1.2            Recent Labs   Lab 22  0446    BNP 1,147*           IMAGIN22 EKG: Junctional scape rhythm rate 47        22 TTE:  The left ventricle is normal in size with eccentric hypertrophy and normal systolic function. The estimated ejection fraction is 65%.  Normal right ventricular size with normal right ventricular systolic function.  Normal left ventricular diastolic function.  Severe left atrial enlargement.  Mild tricuspid regurgitation.  Mild aortic regurgitation.  Mechanically ventilated; cannot use inferior caval vein diameter to estimate central venous pressure.        EF   Date Value Ref Range Status   2022 65 % Final   2022 65 % Final         Prior Ischemic Evaluation:  19 Stress test:   The perfusion scan is free of evidence from myocardial ischemia or injury.  There is a mild intensity fixed defect in the anterolateral wall of the left ventricle, secondary to soft tissue attenuation.  Visually estimated LV function is normal.  The EKG portion of this study is negative for myocardial ischemia.  There is no prior study available for comparison.          PLAN:  EP consult  Stop Amiodarone, and propofol         Axel Sarmiento M.D.  Page # [phone]  Cardiovascular Fellow  [facility]              Tranexamic Acid Pregnancy And Lactation Text: It is unknown if this medication is safe during pregnancy or breast feeding.

## 2022-11-06 NOTE — PROCEDURES
"Angelita Camara is a 61 y.o. female patient.    Temp: 99.4 °F (37.4 °C) (11/06/22 0730)  Pulse: (!) 115 (11/06/22 0800)  Resp: (!) 24 (11/06/22 0714)  BP: 113/64 (11/06/22 0745)  SpO2: (!) 93 % (11/06/22 0800)  Weight: 56.4 kg (124 lb 5.4 oz) (11/06/22 0249)  Height: 5' 4" (162.6 cm) (11/06/22 0714)       Arterial Line    Date/Time: 11/6/2022 10:53 AM  Location procedure was performed: [facility] CRITICAL CARE SURGERY  Performed by: Jessica Rivas MD  Authorized by: Jessica Rivas MD   Pre-op Diagnosis: ARDS  Post-operative diagnosis: ARDS  Consent Done: Emergent Situation  Preparation: Patient was prepped and draped in the usual sterile fashion.  Indications: multiple ABGs, respiratory failure and hemodynamic monitoring  Location: right femoral    Patient sedated: yes  Sedatives: fentanyl and propofol  Needle gauge: 20  Seldinger technique: Seldinger technique used  Number of attempts: 1  Complications: No  Specimens: No  Implants: No  Post-procedure: line sutured and dressing applied  Post-procedure CMS: normal  Patient tolerance: Patient tolerated the procedure well with no immediate complications        11/6/2022    "

## 2022-11-06 NOTE — NURSING
Notified Dr. Rosales of patient's pause in rhythm that lasted a few secons. Patient's rhythm back with sustained MAP greater than 65, and palpable pulse noted. Will continue to monitor patient.

## 2022-11-06 NOTE — SUBJECTIVE & OBJECTIVE
Objective:     Vital Signs (Most Recent):  Temp: 99.4 °F (37.4 °C) (11/06/22 0730)  Pulse: (!) 115 (11/06/22 0800)  Resp: (!) 24 (11/06/22 0714)  BP: 113/64 (11/06/22 0745)  SpO2: (!) 93 % (11/06/22 0800)   Vital Signs (24h Range):  Temp:  [98.3 °F (36.8 °C)-99.4 °F (37.4 °C)] 99.4 °F (37.4 °C)  Pulse:  [] 115  Resp:  [10-27] 24  SpO2:  [89 %-99 %] 93 %  BP: ()/(40-99) 113/64     Weight: 56.4 kg (124 lb 5.4 oz)  Body mass index is 21.34 kg/m².    SpO2: (!) 93 %  O2 Device (Oxygen Therapy): ventilator      Intake/Output Summary (Last 24 hours) at 11/6/2022 1026  Last data filed at 11/6/2022 0900  Gross per 24 hour   Intake 3103.73 ml   Output 3040 ml   Net 63.73 ml       Physical Exam  Vitals reviewed.   Constitutional:       Appearance: She is ill-appearing.   HENT:      Head: Normocephalic.   Neck:      Comments: No JVD  Cardiovascular:      Rate and Rhythm: Tachycardia present. Rhythm irregular.      Pulses: Normal pulses.   Pulmonary:      Comments: intubated  Musculoskeletal:      Right lower leg: No edema.      Left lower leg: No edema.      Comments: Warm BLE   Skin:     Capillary Refill: Capillary refill takes less than 2 seconds.      Findings: No rash.     Significant Labs: All pertinent lab results from the last 24 hours have been reviewed.

## 2022-11-06 NOTE — NURSING
Please see my prior notes for more detailed account of the shift events.     At approximately 0615hrs patient again experienced hypotension. At this same time, Dr. Polanco was rounding at the bedside. Albumin bolus 100mL infusing at his arrival. Reassessment revealed further decreased MAP; VORB from [place] to continue bolus to max 500mL for MAP > 65      Esmolol began at 50 mcg/kg/min for AF RVR 120s-140s    Dr. Polanco authorizes to reconsult cards/EP    No pressors without speaking with Dr. Polanco [phone]

## 2022-11-06 NOTE — NURSING
Patient reentered AF RVR since approximately 2340hrs. The rate has been in the lower 120s. It occasionally converts into bradycardia at a rate of 40; this has been both sinus and junctional. I have updated with Dr. Leiva multiple times in person and via telephone. Amio remains off. Dr. Ruiz has been updated as well. I am told we are awaiting cardiology attending to develop a plan of care. Patient remains hemodynamically stable. WCTM

## 2022-11-06 NOTE — NURSING
Plan update:    Spoke with Dr. Ruiz via telephone. She states the plan from cardiology, Dr. Sarmiento, is to stop Amiodarone and Esmolol.     Should the HR sustain greater than 120, contact cardiology and SICU again for plan and intervention reassessment.

## 2022-11-06 NOTE — PROGRESS NOTES
These are the vitals for the 0100hrs before daylight savings.        11/06/22 0100 11/06/22 0115 11/06/22 0120   Vital Signs   Pulse 90 99  --    Resp 20 20  --    Resp Rate Total 20 br/min 20 br/min  --    SpO2 98 % 97 %  --    Oxygen Concentration (%) 80 80  --    BP (!) 99/51 (!) 108/53 (!) 100/53   MAP (mmHg) 71 77 73      11/06/22 0130 11/06/22 0140   Vital Signs   Pulse 102  --    Resp 20  --    Resp Rate Total 20 br/min  --    SpO2 98 %  --    Oxygen Concentration (%) 80  --    BP (!) 92/54 (!) 101/52   MAP (mmHg) 67 74

## 2022-11-06 NOTE — PROGRESS NOTES
Will bolus 100mL Albumin 5% then reassess per my earlier note regard the order from Dr. Polanco allowing max volume of 500mL for MAP <65.          11/06/22 0615   Vital Signs   BP (!) 75/48   MAP (mmHg) 55

## 2022-11-06 NOTE — PROGRESS NOTES
[facility]  Cardiac Electrophysiology  Progress Note    Admission Date: 11/2/2022  Code Status: Full Code   Attending Physician: Melo Schafer MD   Expected Discharge Date: 11/8/2022  Principal Problem:Squamous cell esophageal cancer    Subjective:     Patient with intermittent Afib -140s and sinus pauses of 4-6 seconds overnight.    Objective:     Vital Signs (Most Recent):  Temp: 99.4 °F (37.4 °C) (11/06/22 0730)  Pulse: (!) 115 (11/06/22 0800)  Resp: (!) 24 (11/06/22 0714)  BP: 113/64 (11/06/22 0745)  SpO2: (!) 93 % (11/06/22 0800)   Vital Signs (24h Range):  Temp:  [98.3 °F (36.8 °C)-99.4 °F (37.4 °C)] 99.4 °F (37.4 °C)  Pulse:  [] 115  Resp:  [10-27] 24  SpO2:  [89 %-99 %] 93 %  BP: ()/(40-99) 113/64     Weight: 56.4 kg (124 lb 5.4 oz)  Body mass index is 21.34 kg/m².    SpO2: (!) 93 %  O2 Device (Oxygen Therapy): ventilator      Intake/Output Summary (Last 24 hours) at 11/6/2022 1026  Last data filed at 11/6/2022 0900  Gross per 24 hour   Intake 3103.73 ml   Output 3040 ml   Net 63.73 ml       Physical Exam  Vitals reviewed.   Constitutional:       Appearance: She is ill-appearing.   HENT:      Head: Normocephalic.   Neck:      Comments: No JVD  Cardiovascular:      Rate and Rhythm: Tachycardia present. Rhythm irregular.      Pulses: Normal pulses.   Pulmonary:      Comments: intubated  Musculoskeletal:      Right lower leg: No edema.      Left lower leg: No edema.      Comments: Warm BLE   Skin:     Capillary Refill: Capillary refill takes less than 2 seconds.      Findings: No rash.     Significant Labs: All pertinent lab results from the last 24 hours have been reviewed.    Assessment and Plan:     Paroxysmal atrial fibrillation  61 y.o. female with esophagectomy on 11/2/22. Post op she was moved to SICU. Hospital course complicated by AF with RVR, on amio. Also acute hypoxic respiratory failure due to ARDS requiring intubation. On morning of 11/4, patient  developed worsening hypotension requiring pressors. Flow track with low cardiac output. Cardiology consulted given concern for cardiogenic shock. Bedside echo with hyperdynamic EF, SVO2 from port was 60, EF was preserved. Patient had no evidence of cardiogenic shock, no interventions needed. 11/5 the patient continues to have intermittent AF RVR, was restarted on amiodarone drip at 1.5. The patient developed junctional rhythm rate 40's and sinus pauses up to 4-6 seconds. Cardiology was consulted and recommended esmolol gtt.    Plan  Wean propofol as able (ARDS will likely require propofol)  Prefer fentanyl for bradycardia  Narrow complex   Does not need a TVP at this time.   Platelets 28  Will re-evaluate for a pacemaker once acute illness improved  Change esmolol gtt for goal HR <120. Wean as able. Goal to keep HR from 140s or causing hypotension requiring vasopressors   Primary team limiting pressors given recent surgery concerns for healing/blood flow  Limit amiodarone off anticoagulation, restart anticoagulation once safely determined by primary team.    Case discussed with EP fellow and Dr. Cisneros. Communicated with surgery team at bedside.        Jamari Rosales MD  Cardiac Electrophysiology  [facility]

## 2022-11-06 NOTE — PROGRESS NOTES
[facility]  General Surgery  Progress Note    Subjective:     History of Present Illness:  No notes on file    Post-Op Info:  Procedure(s) (LRB):  XI ROBOTIC ESOPHAGECTOMY Converted to open at 1215  (N/A)  LYSIS, ADHESIONS  VATS, WITH WEDGE RESECTION, LUNG (Right)  CHOLECYSTECTOMY  ESOPHAGECTOMY   4 Days Post-Op     Interval History: MAP in 50s, getting albumin this AM. Having sinus arrest overnight with some tachyarrhythmia. PEEP increased to 12 Hgb stable, WBC 16.4 from 13.3.    Medications:  Continuous Infusions:   sodium chloride 0.9% 1 mL/hr at 11/06/22 0800    sodium chloride 0.9% 5 mL/hr at 11/06/22 0800    esmolol 50 mcg/kg/min (11/06/22 0800)    fentanyl 200 mcg/hr (11/06/22 0800)    propofoL 20 mcg/kg/min (11/06/22 0800)    TPN ADULT CENTRAL LINE CUSTOM 40 mL/hr at 11/06/22 0800     Scheduled Meds:   enoxaparin  30 mg Subcutaneous Daily    famotidine (PF)  20 mg Intravenous BID    furosemide (LASIX) injection  20 mg Intravenous Q6H    mupirocin   Nasal BID    nicotine  1 patch Transdermal Daily    sodium chloride 0.9%  10 mL Intravenous Q6H     PRN Meds:sodium chloride, calcium gluconate IVPB, calcium gluconate IVPB, calcium gluconate IVPB, fentaNYL, HYDROmorphone, levalbuterol, magnesium sulfate IVPB, magnesium sulfate IVPB, naloxone, ondansetron, potassium chloride in water **AND** potassium chloride in water **AND** potassium chloride in water, sodium chloride 0.9%, sodium chloride 0.9%, Flushing PICC Protocol **AND** sodium chloride 0.9% **AND** sodium chloride 0.9%     Review of patient's allergies indicates:   Allergen Reactions    Shellfish containing products Nausea And Vomiting     All seafood    Topamax [topiramate] Nausea And Vomiting and Other (See Comments)     Vertigo       Objective:     Vital Signs (Most Recent):  Temp: 99.4 °F (37.4 °C) (11/06/22 0730)  Pulse: (!) 115 (11/06/22 0800)  Resp: (!) 24 (11/06/22 0714)  BP: 113/64 (11/06/22 0745)  SpO2: (!)  93 % (11/06/22 0800)   Vital Signs (24h Range):  Temp:  [98.3 °F (36.8 °C)-99.4 °F (37.4 °C)] 99.4 °F (37.4 °C)  Pulse:  [] 115  Resp:  [9-27] 24  SpO2:  [89 %-99 %] 93 %  BP: ()/(40-99) 113/64     Weight: 56.4 kg (124 lb 5.4 oz)  Body mass index is 21.34 kg/m².    Intake/Output - Last 3 Shifts         11/04 0700 11/05 0659 11/05 0700 11/06 0659 11/06 0700 11/07 0659    I.V. (mL/kg) 1279 (27.1) 1430.1 (25.4) 115.2 (2)    Blood 800 211     IV Piggyback 730.2 320.8 200     950.2 80.1    Total Intake(mL/kg) 3670.2 (77.8) 2912.1 (51.6) 395.3 (7)    Urine (mL/kg/hr) 2520 (2.2) 3500 (2.6) 100 (1)    Drains 4 1.5     Chest Tube 400 160 30    Total Output 2924 3661.5 130    Net +746.2 -749.4 +265.3                   Physical Exam  Vitals and nursing note reviewed.   Constitutional:       Appearance: Normal appearance.   HENT:      Mouth/Throat:      Comments: Left neck incision is closed  Neck drain with serous output, without swelling  Cardiovascular:      Rate and Rhythm: Normal rate and regular rhythm.      Pulses: Normal pulses.   Pulmonary:      Effort: Pulmonary effort is normal.      Comments: Right chest tube without air leak with SS drainage  Incisions are clean, dry and intact  Abdominal:      Palpations: Abdomen is soft.      Comments: Midline incision is clean, dry and intact  Musculoskeletal:         General: No swelling.   Skin:     General: Skin is warm.      Capillary Refill: Capillary refill takes less than 2 seconds.   Neurological:      General: No focal deficit present.      Mental Status: She is alert.     Significant Labs:  I have reviewed all pertinent lab results within the past 24 hours.  CBC:   Recent Labs   Lab 11/06/22  0308   WBC 16.41*   RBC 2.97*   HGB 10.0*   HCT 28.8*   PLT 28*   MCV 97   MCH 33.7*   MCHC 34.7     BMP:   Recent Labs   Lab 11/06/22  0308   *      K 4.3      CO2 25   BUN 25*   CREATININE 0.7   CALCIUM 8.6*   MG 1.9       Significant  Diagnostics:  I have reviewed all pertinent imaging results/findings within the past 24 hours.    Assessment/Plan:     * Squamous cell esophageal cancer  Angelita Camara is a 61 y.o. female with squamous cell esophageal cancer s/p Tyrone esophagectomy on 11/2/22 now POD3    - PRN pain meds  - NPO (occasional mouth swabs are OK)  - Continue TPN  - Consult cardiology for ongoing dysarrythmia  - Discuss with heme/onc steroids for low platelets  - Continue amiodarone gtt  - Wean vent settings as able  - Diurese as tolerated, holding AM diuresis currently  - Replete lytes PRN  - Keep chest tube to suction  - Keep cervical ARMINDA drain to suction  - DVT ppx  - PT/OT once extubated    - Dispo: Continue SICU care    H/O esophagectomy  61F with history of thrombocytopenia, Afib, smoker s/p Tyrone esophagectomy on 11/2. Had previous gastric perforation complicated by takeback for abdominal abscess and gastroepiploic pedicle was not robust. However, conduit appeared well perfused. Had biopsy of concerning right pleural based lung lesion was negative for malignancy. No Jtube was placed due to adhesions.    NPO (occasional mouth swabs are OK)  Consult to heme-onc to evaluate for immune thrombocytopenia so we have a contingency plan (she did not respond to platelet transfusion last night)  Discontinue sin and nelly this AM  Start TPN (no Jtube)  Start IV amiodarone (was on PO at home but only for one month so needs to continue on with IV)  Keep chest tube to suction        Kim Crespo MD  General Surgery  [facility] - Surgical Intensive Care

## 2022-11-07 NOTE — PROGRESS NOTES
[facility]  General Surgery  Progress Note    Subjective:     History of Present Illness:  No notes on file    Post-Op Info:  Procedure(s) (LRB):  XI ROBOTIC ESOPHAGECTOMY Converted to open at 1215  (N/A)  LYSIS, ADHESIONS  VATS, WITH WEDGE RESECTION, LUNG (Right)  CHOLECYSTECTOMY  ESOPHAGECTOMY   5 Days Post-Op     Interval History: Remains in a fib with intermittent pauses. No pacer needed at this time per cards. Esmolol at 30. Slowly weaning vent settings currently on 70/8 from 60/12 yesterday. Net positive 1.7 L with adequate UOP. Plts down to 16k this morning. Not bleeding    Medications:  Continuous Infusions:   sodium chloride 0.9% 1 mL/hr at 11/07/22 0602    sodium chloride 0.9% 1 mL/hr at 11/07/22 0602    esmolol 30 mcg/kg/min (11/06/22 1740)    fentanyl 200 mcg/hr (11/07/22 0602)    propofoL 15 mcg/kg/min (11/07/22 0602)    TPN ADULT CENTRAL LINE CUSTOM 40 mL/hr at 11/07/22 0602     Scheduled Meds:   dexamethasone (DECADRON) IVPB  40 mg Intravenous Daily    enoxaparin  30 mg Subcutaneous Daily    famotidine (PF)  20 mg Intravenous BID    levalbuterol  0.63 mg Nebulization Q4H    mupirocin   Nasal BID    nicotine  1 patch Transdermal Daily    sodium chloride 0.9%  10 mL Intravenous Q6H    sodium chloride 3%  4 mL Nebulization Q8H     PRN Meds:sodium chloride, calcium gluconate IVPB, calcium gluconate IVPB, calcium gluconate IVPB, fentaNYL, HYDROmorphone, magnesium sulfate IVPB, magnesium sulfate IVPB, naloxone, ondansetron, potassium chloride in water **AND** potassium chloride in water **AND** potassium chloride in water, sodium chloride 0.9%, sodium chloride 0.9%, Flushing PICC Protocol **AND** sodium chloride 0.9% **AND** sodium chloride 0.9%     Review of patient's allergies indicates:   Allergen Reactions    Shellfish containing products Nausea And Vomiting     All seafood    Topamax [topiramate] Nausea And Vomiting and Other (See Comments)     Vertigo        Objective:     Vital Signs (Most Recent):  Temp: 97.9 °F (36.6 °C) (11/07/22 0350)  Pulse: (!) 135 (11/07/22 0701)  Resp: (!) 21 (11/07/22 0701)  BP: 97/63 (11/07/22 0600)  SpO2: (!) 94 % (11/07/22 0701)   Vital Signs (24h Range):  Temp:  [97.9 °F (36.6 °C)-99.4 °F (37.4 °C)] 97.9 °F (36.6 °C)  Pulse:  [] 135  Resp:  [20-24] 21  SpO2:  [86 %-100 %] 94 %  BP: ()/(50-72) 97/63  Arterial Line BP: ()/(41-71) 105/50     Weight: 56.4 kg (124 lb 5.4 oz)  Body mass index is 21.34 kg/m².    Intake/Output - Last 3 Shifts         11/05 0700 11/06 0659 11/06 0700 11/07 0659 11/07 0700 11/08 0659    I.V. (mL/kg) 1430.1 (25.4) 1495.8 (26.5)     Blood 211      IV Piggyback 320.8 350     .2 958.5     Total Intake(mL/kg) 2912.1 (51.6) 2804.3 (49.7)     Urine (mL/kg/hr) 3500 (2.6) 930 (0.7)     Drains 1.5 0     Chest Tube 160 190     Total Output 3661.5 1120     Net -749.4 +1684.3                    Physical Exam  Vitals reviewed.   Constitutional:       General: She is not in acute distress.     Appearance: She is ill-appearing. She is not toxic-appearing.   HENT:      Mouth/Throat:      Comments: ETT in place   Neck:      Comments: Left neck incision CDI  ARMINDA drain scant SS fluid  Cardiovascular:      Rate and Rhythm: Tachycardia present. Rhythm irregular.   Pulmonary:      Comments: Mechanically ventilated  Chest:      Comments: Right tunneled portacath  CT in place on suction with SS output.  Abdominal:      General: Abdomen is flat. There is no distension.      Palpations: Abdomen is soft.      Comments: Midline incision c/d/i    Genitourinary:     Comments: Gregory in place  Skin:     General: Skin is warm and dry.   Neurological:      General: No focal deficit present.       Significant Labs:  I have reviewed all pertinent lab results within the past 24 hours.  CBC:   Recent Labs   Lab 11/07/22  0355   WBC 12.30   RBC 2.66*   HGB 9.0*   HCT 26.5*   PLT 16*   *   MCH 33.8*   MCHC 34.0      CMP:   Recent Labs   Lab 11/07/22  0355   *  161*   CALCIUM 8.9  8.9   ALBUMIN 2.8*   PROT 5.6*     142   K 4.3  4.3   CO2 25  25     108   BUN 36*  36*   CREATININE 0.6  0.6   ALKPHOS 99   ALT 21   AST 38   BILITOT 1.4*       Significant Diagnostics:  I have reviewed all pertinent imaging results/findings within the past 24 hours.    Assessment/Plan:     * Squamous cell esophageal cancer  Angelita Camara is a 61 y.o. female with history of thrombocytopenia, Afib, and tobacco use who is s/p Tyrone esophagectomy on 11/2. Had previous gastric perforation complicated by takeback for abdominal abscess and gastroepiploic pedicle was not robust. However, conduit appeared well perfused. Had biopsy of concerning right pleural based lung lesion was negative for malignancy. No Jtube was placed due to adhesions.    - Neuro:    - Propofol and fentanyl  - CV:    - Remains in a fib on esmolol at 30   - EP following and do not recommended TVP at this time. Will touch base with them again today given her sinus pauses are becoming longer    - MAPs >65, SBP >100  - Respit   - ARDS with moderately high vent settings   - Wean as able   - May benefit from diuresis but may not be able to tolerate this from a hemodynamics standpoint  - Renal   - Adequate UOP    - Rising BUN to 36 this morning (previously 23 -> 31)   - Gregory for strict I/O  - FEN   - Net positive 1.7 L in the last 24 hours   - Maintain K>4, P>3, and Mg>2    - NPO, TPN  - GI   - Gastric conduit seems to be viable   - Cervical ARMINDA bulb to suction   - Daily CRP. Started to downtrend today 375 -> 323   - GI ppx  - Endo   - No hx of DM   - q6 POCT  - Heme   - Hx of thrombocytopenia and plts down to 16k   - Started on dexamethasone 11/6 (plan for 4 days)   - Will discuss need for IVIG given her plts are still dropping   - DVT ppx  - MSK   - PT/OT once able    - Dispo: Continue SICU care      Claritza Menezes MD  General Surgery  [facility] - Surgical  Intensive Care

## 2022-11-07 NOTE — PROGRESS NOTES
[facility]  Critical Care - Surgery  Progress Note    Patient Name: Angelita Camara  MRN: [id number]  Admission Date: 11/2/2022  Hospital Length of Stay: 5 days  Code Status: Full Code  Attending Provider: Melo Schafer MD  Primary Care Provider: Yaakov Dan II, MD   Principal Problem: Squamous cell esophageal cancer    Subjective:     Hospital/ICU Course:  60yo F with PMH HTN, PUD, current smoker, perforated gastric ulcer s/p Richard patch, and SCC of the esophagus now s/p robotic assisted esophagectomy with open abdominal portion on 11/2. Patient does not have enteral access. Robotic assisted esophagectomy with open abdominal portion on 11/2 - patient admitted directly to SICU after OR. Course complicated by patient being in a fib with RVR. Patient started on amio gtt. There were pauses in ECG sustained. Monitor reflecting rate 28. Amiodarone stopped evening of 11/5. Multi-function pads placed. Propofol paused, then restarted at lower dose. ECG rate recovered to junctional rhythm, rate 46. Cardiology at bedside evaluating patient and telemetry. Patient started on esmolol gtt.       Interval History/Significant Events: Patient still experiencing sinus arrest overnight with some tachyarrhythmia. Heart rate still up into the 120s. Plt count continuing to drop, now down to 16.     Follow-up For: Procedure(s) (LRB):  XI ROBOTIC ESOPHAGECTOMY Converted to open at 1215  (N/A)  LYSIS, ADHESIONS  VATS, WITH WEDGE RESECTION, LUNG (Right)  CHOLECYSTECTOMY  ESOPHAGECTOMY    Post-Operative Day: 3 Days Post-Op    Objective:     Vital Signs (Most Recent):  Temp: 98.8 °F (37.1 °C) (11/06/22 0500)  Pulse: 110 (11/06/22 0714)  Resp: (!) 24 (11/06/22 0714)  BP: (!) 84/53 (11/06/22 0714)  SpO2: 95 % (11/06/22 0714)   Vital Signs (24h Range):  Temp:  [98.3 °F (36.8 °C)-98.9 °F (37.2 °C)] 98.8 °F (37.1 °C)  Pulse:  [] 110  Resp:  [9-27] 24  SpO2:  [88 %-99 %] 95 %  BP: ()/(40-99) 84/53  Arterial Line  BP: ()/(49-59) 128/49     Weight: 56.4 kg (124 lb 5.4 oz)  Body mass index is 21.34 kg/m².      Intake/Output Summary (Last 24 hours) at 11/6/2022 0738  Last data filed at 11/6/2022 0600  Gross per 24 hour   Intake 2628.27 ml   Output 3361.5 ml   Net -733.23 ml       Physical Exam  Vitals reviewed.   Constitutional:       Appearance: She is ill-appearing.   HENT:      Mouth/Throat:      Comments: ETT in place   Neck:      Comments: Left neck incision CDI  ARMINDA drain with minimal fluid.  Cardiovascular:      Rate and Rhythm: Normal rate and regular rhythm.      Pulses: Normal pulses.      Heart sounds: Normal heart sounds.   Pulmonary:      Effort: Pulmonary effort is normal.      Breath sounds: Normal breath sounds.   Chest:      Comments: Right tunneled portacath  CT in place on suction with SS output.  Abdominal:      General: Abdomen is flat. Bowel sounds are normal.      Palpations: Abdomen is soft.      Comments: Incisions CDI   Genitourinary:     Comments: Gregory in place  Skin:     General: Skin is warm.      Capillary Refill: Capillary refill takes less than 2 seconds.     Vents:  Vent Mode: A/C (11/06/22 0714)  Set Rate: 20 BPM (11/06/22 0714)  Vt Set: 470 mL (11/06/22 0714)  PEEP/CPAP: 12 cmH20 (11/06/22 0714)  Oxygen Concentration (%): 70 (11/06/22 0714)  Peak Airway Pressure: 45 cmH2O (11/06/22 0714)  Plateau Pressure: 0 cmH20 (11/06/22 0714)  Total Ve: 9.96 L/m (11/06/22 0714)  Negative Inspiratory Force (cm H2O): 0 (11/06/22 0714)  F/VT Ratio<105 (RSBI): (!) 44.28 (11/06/22 0714)    Lines/Drains/Airways       Peripherally Inserted Central Catheter Line  Duration             PICC Triple Lumen 11/04/22 1120 right brachial 1 day              Central Venous Catheter Line  Duration             Port A Cath Single Lumen 11/03/22 2300 right subclavian 2 days              Drain  Duration                  Chest Tube 11/02/22 Right Midaxillary 24 Fr. 4 days         Closed/Suction Drain 11/02/22 1630  Left;Superior Chest Bulb 19 Fr. 3 days         Urethral Catheter 11/03/22 2208 Silicone 16 Fr. 2 days              Airway  Duration                  Airway - Non-Surgical 11/04/22 0209 Endotracheal Tube 2 days       Airway Anesthesia 11/04/22 2 days              Peripheral Intravenous Line  Duration                  Peripheral IV - Single Lumen 11/03/22 2209 20 G Left Antecubital 2 days                    Significant Labs:    CBC/Anemia Profile:  Recent Labs   Lab 11/04/22  1430 11/04/22  1854 11/05/22  0328 11/05/22  0503 11/06/22  0308   WBC 13.80*  --  13.33*  --  16.41*   HGB 10.8*  --  9.6*  --  10.0*   HCT 30.4*   < > 27.3* 26* 28.8*   PLT 38*  --  34*  --  28*   MCV 96  --  97  --  97   RDW 21.7*  --  22.6*  --  22.1*    < > = values in this interval not displayed.        Chemistries:  Recent Labs   Lab 11/04/22  1540 11/05/22  0328 11/05/22  1143 11/05/22  1822 11/06/22  0053 11/06/22  0308    134*   < > 140 139 139   K 3.5 3.7   < > 3.5 4.0 4.3    106   < > 107 107 104   CO2 20* 19*   < > 25 24 25   BUN 14 17   < > 23* 24* 25*   CREATININE 0.6 0.6   < > 0.6 0.6 0.7   CALCIUM 8.7 8.7   < > 8.5* 8.4* 8.6*   ALBUMIN  --  2.9*  --   --   --  2.4*   PROT  --  5.4*  --   --   --  5.5*   BILITOT  --  0.9  --   --   --  0.8   ALKPHOS  --  65  --   --   --  100   ALT  --  14  --   --   --  16   AST  --  36  --   --   --  35   MG 1.9 2.3   < > 2.1 2.0 1.9   PHOS 1.2* 2.7  --   --   --  3.3    < > = values in this interval not displayed.       All pertinent labs within the past 24 hours have been reviewed.    Significant Imaging:  I have reviewed all pertinent imaging results/findings within the past 24 hours.    Assessment/Plan:     Paroxysmal atrial fibrillation    Neuro/Psych:   -- Sedation: propofol gtt, fentanyl gtt   -- Pain: Dilaudid PRN             Cards:   -- Cards consulted for echo given low CI on flow track. Bedside echo demonstrated adequate contraction of all chambers without major  abnormalities. Suspicion for cardiogenic pathology is low.   #A.fib  -- a.fib with RVR yesterday: cardiology consulted because patient gee'd down to 28 with pauses in EKG becoming more frequent, amio gtt stopped, patient back in a fib with RVR  -- now on esmolol gtt, but pts Bp continues to be labile. Will discuss with cardiology today for additional recs      Pulm:   -- Vent - satting 90-93.  -- Wean PEEP as possible   -- Current smoker 1/2 PPD, COPD  -- Nicotine patch  -- Goal O2 sat > 90%  #CXR  -- Worsening bilateral pattern of opacity - SIRS vs. Edema, serial CXRs showing minimal improvement    -- optimizing vent settings today, dropping TV as peak pressures are rising        Renal:  -- Gregory with strict I/Os  -- Stop mIV  -- BUN/Cr 15/0.7  -- 40mg IV lasix BID today       FEN / GI:   -- Replace lytes as needed  -- Nutrition: TPN  -- Nutrition consult placed, rec's appreciated        ID:   -- Tm: afebrile; WBC 12.30  -- Abx none      Heme/Onc:   -- H/H stable 9.0/26.5  -- Daily CBC  #Thrombocytopenia  -- Received 1 unit of platelet 11/2.  -- Heme/Onc consulted for possible ITP.   - plt count continues to drop, now down to 16. Pt on decadron 40mg IV. Will discuss with heme/onc about possible IVIG today     Endo:   -- Gluc goal 140-180  -- No hx DM      PPx:   Feeding: TPN  Analgesia/Sedation: See above  Thromboembolic prevention: lovenox   HOB >30: Yes  Stress Ulcer ppx: famotidine  Glucose control: Critical care goal 140-180 g/dl    Lines/Drains/Airway: Right CT, ETT, neck ARMINDA.       Dispo/Code Status/Palliative:   -- continue SICU care  / Full Code         Critical Care Daily Checklist:    A: Awake: RASS Goal/Actual Goal: RASS Goal: -2-->light sedation  Actual: Le Agitation Sedation Scale (RASS): Light sedation   B: Spontaneous Breathing Trial Performed?     C: SAT & SBT Coordinated?  Not today                  D: Delirium: CAM-ICU Overall CAM-ICU: Positive   E: Early Mobility Performed? No   F:  Feeding Goal: Goals: Meet % EEN by next RD f/u  Status: Nutrition Goal Status: new   Current Diet Order   Procedures    Diet NPO      AS: Analgesia/Sedation Propofol, fentanyl   T: Thromboembolic Prophylaxis SQH   H: HOB > 300 Yes   U: Stress Ulcer Prophylaxis (if needed) Pepcid   G: Glucose Control Adequate   B: Bowel Function     I: Indwelling Catheter (Lines & Villegas) Necessity Right CT, ETT, villegas, right femoral a-line, neck ARMINDA, right sided PICC line      D: De-escalation of Antimicrobials/Pharmacotherapies None    Plan for the day/ETD Continue SICU care    Code Status:  Family/Goals of Care: Full Code         Critical secondary to Patient has a condition that poses threat to life and bodily function: Severe Respiratory Distress     Critical care was time spent personally by me on the following activities: development of treatment plan with patient or surrogate and bedside caregivers, discussions with consultants, evaluation of patient's response to treatment, examination of patient, ordering and performing treatments and interventions, ordering and review of laboratory studies, ordering and review of radiographic studies, pulse oximetry, re-evaluation of patient's condition.  This critical care time did not overlap with that of any other provider or involve time for any procedures.     Maria De Jesus Farrell MD  Critical Care - Surgery  [facility]

## 2022-11-07 NOTE — NURSING
ECG representing sinus arrest lasting approx 5.2 seconds. Spoke to Dr. Villarreal with cardiology. He states may titrate Esmolol down by 5 mcg/kg/min. However, gives insight that the patient did not tolerate much titration during the day, resulting in frequent upswings in HR >120.     As mentioned in my earlier note, during the first night shift sinus arrest, we titrated the Esmolol from 35 to 25; there is been no HR sustained >120 since that titration. Dr. Villarreal states should not titrate Esmolol to off, but if tolerated, may decrease as low as 5 mcg/kg/min.       Additionally, 2300hrs UOP is 5mL. TORB from Dr. Rivas to administer remainder of existing Albumin 5% bottle, which is approximately 300mL

## 2022-11-07 NOTE — PROGRESS NOTES
[facility]  Cardiac Electrophysiology  Progress Note    Admission Date: 11/2/2022  Code Status: Full Code   Attending Physician: Melo Schafer MD   Expected Discharge Date: 11/8/2022  Principal Problem:Squamous cell esophageal cancer    Subjective:     Patient continued to have sinus pauses with titration up of esmolol. Continue to wean esmolol and propofol as tolerated.    Objective:     Vital Signs (Most Recent):  Temp: 98.1 °F (36.7 °C) (11/06/22 2315)  Pulse: (!) 120 (11/07/22 0209)  Resp: 20 (11/07/22 0209)  BP: (!) 90/53 (11/07/22 0100)  SpO2: (!) 94 % (11/07/22 0209)   Vital Signs (24h Range):  Temp:  [98.1 °F (36.7 °C)-99.4 °F (37.4 °C)] 98.1 °F (36.7 °C)  Pulse:  [] 120  Resp:  [20-27] 20  SpO2:  [86 %-100 %] 94 %  BP: ()/(40-99) 90/53  Arterial Line BP: ()/(41-71) 127/61     Weight: 56.4 kg (124 lb 5.4 oz)  Body mass index is 21.34 kg/m².    SpO2: (!) 94 %  O2 Device (Oxygen Therapy): ventilator      Intake/Output Summary (Last 24 hours) at 11/7/2022 0220  Last data filed at 11/7/2022 0100  Gross per 24 hour   Intake 2885.66 ml   Output 1805 ml   Net 1080.66 ml       Physical Exam  Vitals reviewed.   Constitutional:       Appearance: She is ill-appearing.   HENT:      Head: Normocephalic.   Neck:      Comments: No JVD  Cardiovascular:      Rate and Rhythm: Tachycardia present. Rhythm irregular.      Pulses: Normal pulses.   Pulmonary:      Comments: intubated  Musculoskeletal:      Right lower leg: No edema.      Left lower leg: No edema.   Skin:     Capillary Refill: Capillary refill takes less than 2 seconds.     Significant Labs: All pertinent lab results from the last 24 hours have been reviewed.      Assessment and Plan:     Paroxysmal atrial fibrillation  61 y.o. female with esophagectomy on 11/2/22. Post op she was moved to SICU. Hospital course complicated by AF with RVR, on amio. Also acute hypoxic respiratory failure due to ARDS requiring intubation. On  morning of 11/4, patient developed worsening hypotension requiring pressors. Flow track with low cardiac output. Cardiology consulted given concern for cardiogenic shock. Bedside echo with hyperdynamic EF, SVO2 from port was 60, EF was preserved. Patient had no evidence of cardiogenic shock, no interventions needed. 11/5 the patient continues to have intermittent AF RVR, was restarted on amiodarone drip at 1.5. The patient developed junctional rhythm rate 40's and sinus pauses up to 4-6 seconds. Cardiology was consulted and recommended esmolol gtt.    Plan  Wean propofol as able (ARDS will likely require propofol)  Prefer fentanyl for bradycardia  Narrow complex   Does not need a TVP at this time.   Platelets 28  Will re-evaluate for a pacemaker once acute illness improved  Change esmolol gtt for goal HR <120 and wean as much as tolerated. Goal to keep HR from 130-140s sustained or causing hypotension requiring vasopressors   Primary team limiting pressors given recent surgery concerns for healing/blood flow  Limit amiodarone off anticoagulation, restart anticoagulation once safely determined by primary team.    Case discussed with EP fellow and Dr. Cisneros.        Jamari Rosales MD  Cardiac Electrophysiology  [facility]

## 2022-11-07 NOTE — NURSING
"      SICU PLAN OF CARE NOTE    Dx: Squamous cell esophageal cancer    Shift Events: Vent changes. Increased sedation. UA. Family present and updated on patient status and plan of care by Dr. PRANAY Polanco.     Goals of Care: MAP >65; HR <120; Sats >88%. Wean vent as tolerated. Monitor PLT count.     Neuro: Sedated, Arouses to Voice, Follows Commands, and Moves All Extremities    Vital Signs: /63 (BP Location: Left arm, Patient Position: Lying)   Pulse 70   Temp 97.7 °F (36.5 °C) (Oral)   Resp (!) 26   Ht 5' 4" (1.626 m)   Wt 56.4 kg (124 lb 5.4 oz)   SpO2 97%   Breastfeeding No   BMI 21.34 kg/m²     Cardiac: Afib 120-140s at initial assessment. Esmolol increased to 35 mcg/kg/min with no changes in HR noted. Once sedation increased per Dr. Gray's order for vent dyssynchrony, HR noteably decreased to 60-90s. No pauses or bradycardia throughout shift. Now NSR 75 bpm    Respiratory: Ventilator ACVC +/18/370/70%/+10    Diet: NPO and TPN    Gtts: Propfol, Fentanyl, and Esmolol    Urine Output: Urinary Catheter 1065 cc/shift. 40 mg Lasix BID    Drains: Chest Tube 110 cc/shift;  ARMINDA drain 0 cc/shift    Restraints q2h checks  no injury     Labs/Accuchecks: BMP Mg accuchecks q6h    Skin: intact. No breakdown noted. Bruising. Waffle placed today. Turned and repositioned q2h. Heels and elbows offloaded. Pox and ett rotated.        "

## 2022-11-07 NOTE — NURSING
Bedside monitor alarming asystole. ECG represents approx 7.2 seconds of sinus arrest. Prior to this,  ECG has been representing AF rate 90s.    At the time of this sinus arrest, Esmolol infusing at 35. Dr. Rivas at bedside; VORB decrease Esmolol to 25; will speak with cardiology/EP regarding the appropriateness of further down titration.    VORB bolus Album 5% until MAP 65. Of note, Propofol currently at 15; earlier was decreased to 12.5. Patient did not tolerate; writing in bed; dyssynchronous with vent; desatting into 70s. Dosing returned to 15.      Two 50mL bolus Albumin 5% administered from bottle hanging in room from earlier order.

## 2022-11-07 NOTE — ASSESSMENT & PLAN NOTE
Angelita Camara is a 61 y.o. female with history of thrombocytopenia, Afib, and tobacco use who is s/p [place] esophagectomy on 11/2. Had previous gastric perforation complicated by takeback for abdominal abscess and gastroepiploic pedicle was not robust. However, conduit appeared well perfused. Had biopsy of concerning right pleural based lung lesion was negative for malignancy. No Jtube was placed due to adhesions.    - Neuro:    - Propofol and fentanyl  - CV:    - Remains in a fib on esmolol at 30   - EP following and do not recommended TVP at this time. Will touch base with them again today given her sinus pauses are becoming longer    - MAPs >65, SBP >100  - Respit   - ARDS with moderately high vent settings   - Wean as able   - May benefit from diuresis but may not be able to tolerate this from a hemodynamics standpoint  - Renal   - Adequate UOP    - Rising BUN to 36 this morning (previously 23 -> 31)   - Gregory for strict I/O  - FEN   - Net positive 1.7 L in the last 24 hours   - Maintain K>4, P>3, and Mg>2    - NPO, TPN  - GI   - Gastric conduit seems to be viable   - Cervical ARMINDA bulb to suction   - Daily CRP. Started to downtrend today 375 -> 323   - GI ppx  - Endo   - No hx of DM   - q6 POCT  - Heme   - Hx of thrombocytopenia and plts down to 16k   - Started on dexamethasone 11/6 (plan for 4 days)   - Will discuss need for IVIG given her plts are still dropping   - DVT ppx  - MSK   - PT/OT once able    - Dispo: Continue SICU care

## 2022-11-07 NOTE — SUBJECTIVE & OBJECTIVE
Interval History: Remains in a fib with intermittent pauses. No pacer needed at this time per cards. Esmolol at 30. Slowly weaning vent settings currently on 70/8 from 60/12 yesterday. Net positive 1.7 L with adequate UOP. Plts down to 16k this morning. Not bleeding    Medications:  Continuous Infusions:   sodium chloride 0.9% 1 mL/hr at 11/07/22 0602    sodium chloride 0.9% 1 mL/hr at 11/07/22 0602    esmolol 30 mcg/kg/min (11/06/22 1740)    fentanyl 200 mcg/hr (11/07/22 0602)    propofoL 15 mcg/kg/min (11/07/22 0602)    TPN ADULT CENTRAL LINE CUSTOM 40 mL/hr at 11/07/22 0602     Scheduled Meds:   dexamethasone (DECADRON) IVPB  40 mg Intravenous Daily    enoxaparin  30 mg Subcutaneous Daily    famotidine (PF)  20 mg Intravenous BID    levalbuterol  0.63 mg Nebulization Q4H    mupirocin   Nasal BID    nicotine  1 patch Transdermal Daily    sodium chloride 0.9%  10 mL Intravenous Q6H    sodium chloride 3%  4 mL Nebulization Q8H     PRN Meds:sodium chloride, calcium gluconate IVPB, calcium gluconate IVPB, calcium gluconate IVPB, fentaNYL, HYDROmorphone, magnesium sulfate IVPB, magnesium sulfate IVPB, naloxone, ondansetron, potassium chloride in water **AND** potassium chloride in water **AND** potassium chloride in water, sodium chloride 0.9%, sodium chloride 0.9%, Flushing PICC Protocol **AND** sodium chloride 0.9% **AND** sodium chloride 0.9%     Review of patient's allergies indicates:   Allergen Reactions    Shellfish containing products Nausea And Vomiting     All seafood    Topamax [topiramate] Nausea And Vomiting and Other (See Comments)     Vertigo       Objective:     Vital Signs (Most Recent):  Temp: 97.9 °F (36.6 °C) (11/07/22 0350)  Pulse: (!) 135 (11/07/22 0701)  Resp: (!) 21 (11/07/22 0701)  BP: 97/63 (11/07/22 0600)  SpO2: (!) 94 % (11/07/22 0701)   Vital Signs (24h Range):  Temp:  [97.9 °F (36.6 °C)-99.4 °F (37.4 °C)] 97.9 °F (36.6 °C)  Pulse:  [] 135  Resp:  [20-24] 21  SpO2:  [86 %-100 %] 94  %  BP: ()/(50-72) 97/63  Arterial Line BP: ()/(41-71) 105/50     Weight: 56.4 kg (124 lb 5.4 oz)  Body mass index is 21.34 kg/m².    Intake/Output - Last 3 Shifts         11/05 0700 11/06 0659 11/06 0700 11/07 0659 11/07 0700 11/08 0659    I.V. (mL/kg) 1430.1 (25.4) 1495.8 (26.5)     Blood 211      IV Piggyback 320.8 350     .2 958.5     Total Intake(mL/kg) 2912.1 (51.6) 2804.3 (49.7)     Urine (mL/kg/hr) 3500 (2.6) 930 (0.7)     Drains 1.5 0     Chest Tube 160 190     Total Output 3661.5 1120     Net -749.4 +1684.3                    Physical Exam  Vitals reviewed.   Constitutional:       General: She is not in acute distress.     Appearance: She is ill-appearing. She is not toxic-appearing.   HENT:      Mouth/Throat:      Comments: ETT in place   Neck:      Comments: Left neck incision CDI  ARMINDA drain scant SS fluid  Cardiovascular:      Rate and Rhythm: Tachycardia present. Rhythm irregular.   Pulmonary:      Comments: Mechanically ventilated  Chest:      Comments: Right tunneled portacath  CT in place on suction with SS output.  Abdominal:      General: Abdomen is flat. There is no distension.      Palpations: Abdomen is soft.      Comments: Midline incision c/d/i    Genitourinary:     Comments: Gregory in place  Skin:     General: Skin is warm and dry.   Neurological:      General: No focal deficit present.       Significant Labs:  I have reviewed all pertinent lab results within the past 24 hours.  CBC:   Recent Labs   Lab 11/07/22  0355   WBC 12.30   RBC 2.66*   HGB 9.0*   HCT 26.5*   PLT 16*   *   MCH 33.8*   MCHC 34.0     CMP:   Recent Labs   Lab 11/07/22  0355   *  161*   CALCIUM 8.9  8.9   ALBUMIN 2.8*   PROT 5.6*     142   K 4.3  4.3   CO2 25  25     108   BUN 36*  36*   CREATININE 0.6  0.6   ALKPHOS 99   ALT 21   AST 38   BILITOT 1.4*       Significant Diagnostics:  I have reviewed all pertinent imaging results/findings within the past 24 hours.

## 2022-11-07 NOTE — PLAN OF CARE
Hematology Plan of Care    Patient is a 60 y/o female with locally advanced SSC of the esophagus s/p neoadj chemo [place] [facility] [place] with ARDs on mechanical ventilation. She was dx with ITP in 8/2022 due to severe thrombocytopenia given dex/IVIG with modest improvement.     Presumed dx of ITP.   Recommend to administer dex 40mg IV push x 4 days. Today is day 2.  IVIG If platelets < 10K or refractory to dexamethasone.   Will continue to monitor counts.      We will follow counts. Please call for qs.    Mara De Anda MD  Hematology/Oncology Fellow PGY V  [facility]

## 2022-11-07 NOTE — ASSESSMENT & PLAN NOTE
61 y.o. female with esophagectomy on 11/2/22. Post op she was moved to SICU. Hospital course complicated by AF with RVR, on amio. Also acute hypoxic respiratory failure due to ARDS requiring intubation. On morning of 11/4, patient developed worsening hypotension requiring pressors. Flow track with low cardiac output. Cardiology consulted given concern for cardiogenic shock. Bedside echo with hyperdynamic EF, SVO2 from port was 60, EF was preserved. Patient had no evidence of cardiogenic shock, no interventions needed. 11/5 the patient continues to have intermittent AF RVR, was restarted on amiodarone drip at 1.5. The patient developed junctional rhythm rate 40's and sinus pauses up to 4-6 seconds. Cardiology was consulted and recommended esmolol gtt.    Plan  Wean propofol as able (ARDS will likely require propofol)  Prefer fentanyl for bradycardia  Narrow complex   Does not need a TVP at this time.   Platelets 28  Will re-evaluate for a pacemaker once acute illness improved  Change esmolol gtt for goal HR <120 and wean as much as tolerated. Goal to keep HR from 130-140s sustained or causing hypotension requiring vasopressors   Primary team limiting pressors given recent surgery concerns for healing/blood flow  Limit amiodarone off anticoagulation, restart anticoagulation once safely determined by primary team.    Case discussed with EP fellow and Dr. Cisneros.

## 2022-11-07 NOTE — SUBJECTIVE & OBJECTIVE
Objective:     Vital Signs (Most Recent):  Temp: 98.1 °F (36.7 °C) (11/06/22 2315)  Pulse: (!) 120 (11/07/22 0209)  Resp: 20 (11/07/22 0209)  BP: (!) 90/53 (11/07/22 0100)  SpO2: (!) 94 % (11/07/22 0209)   Vital Signs (24h Range):  Temp:  [98.1 °F (36.7 °C)-99.4 °F (37.4 °C)] 98.1 °F (36.7 °C)  Pulse:  [] 120  Resp:  [20-27] 20  SpO2:  [86 %-100 %] 94 %  BP: ()/(40-99) 90/53  Arterial Line BP: ()/(41-71) 127/61     Weight: 56.4 kg (124 lb 5.4 oz)  Body mass index is 21.34 kg/m².    SpO2: (!) 94 %  O2 Device (Oxygen Therapy): ventilator      Intake/Output Summary (Last 24 hours) at 11/7/2022 0220  Last data filed at 11/7/2022 0100  Gross per 24 hour   Intake 2885.66 ml   Output 1805 ml   Net 1080.66 ml       Physical Exam  Vitals reviewed.   Constitutional:       Appearance: She is ill-appearing.   HENT:      Head: Normocephalic.   Neck:      Comments: No JVD  Cardiovascular:      Rate and Rhythm: Tachycardia present. Rhythm irregular.      Pulses: Normal pulses.   Pulmonary:      Comments: intubated  Musculoskeletal:      Right lower leg: No edema.      Left lower leg: No edema.   Skin:     Capillary Refill: Capillary refill takes less than 2 seconds.     Significant Labs: All pertinent lab results from the last 24 hours have been reviewed.

## 2022-11-07 NOTE — NURSING
RN was asked by Dr. Gray to increase sedation r/t vent dyssynchrony. Propofol increased from 15 mcg to 40 mcg and Fentanyl increased from 200 to 225 mcg. Vent changes made by Dr. Gray and communicated to RT. JOSE.

## 2022-11-07 NOTE — PT/OT/SLP PROGRESS
Physical Therapy      Patient Name:  Angelita Camara   MRN:  [id number]    Patient not seen today secondary to  (intubated). Will follow-up as appropriate.

## 2022-11-08 NOTE — PROGRESS NOTES
[facility]  Cardiac Electrophysiology  Progress Note    Admission Date: 11/2/2022  Code Status: Full Code   Attending Physician: Melo Schafer MD   Expected Discharge Date: 11/17/2022  Principal Problem:Squamous cell esophageal cancer    Subjective:     Patient converted to NSR overnight. Last sinus pause 11/7 at 9am. Off esmolol gtt.    Objective:     Vital Signs (Most Recent):  Temp: 98.2 °F (36.8 °C) (11/08/22 0700)  Pulse: 97 (11/08/22 0745)  Resp: (!) 31 (11/08/22 0445)  BP: (!) 108/57 (11/08/22 0700)  SpO2: (!) 90 % (11/08/22 0745)   Vital Signs (24h Range):  Temp:  [97.7 °F (36.5 °C)-98.4 °F (36.9 °C)] 98.2 °F (36.8 °C)  Pulse:  [] 97  Resp:  [23-35] 31  SpO2:  [82 %-99 %] 90 %  BP: ()/(49-74) 108/57  Arterial Line BP: ()/(36-72) 117/43   Weight: 56.4 kg (124 lb 5.4 oz)  Body mass index is 21.34 kg/m².  SpO2: (!) 90 %  O2 Device (Oxygen Therapy): ventilator    Intake/Output Summary (Last 24 hours) at 11/8/2022 0757  Last data filed at 11/8/2022 0702  Gross per 24 hour   Intake 2323.45 ml   Output 2147 ml   Net 176.45 ml     Physical Exam  Vitals reviewed.   Constitutional:       Appearance: She is ill-appearing.   HENT:      Head: Normocephalic.   Neck:      Comments: No JVD  Cardiovascular:      Rate and Rhythm: Normal rate and regular rhythm.      Pulses: Normal pulses.   Pulmonary:      Comments: intubated  Musculoskeletal:      Right lower leg: No edema.      Left lower leg: No edema.   Skin:     Capillary Refill: Capillary refill takes less than 2 seconds.     Significant Labs: Reviewed  Assessment and Plan:     Paroxysmal atrial fibrillation  61 y.o. female with esophagectomy on 11/2/22. Post op she was moved to SICU. Hospital course complicated by AF with RVR, on amio. Also acute hypoxic respiratory failure due to ARDS requiring intubation. On morning of 11/4, patient developed worsening hypotension requiring pressors. Flow track with low cardiac output.  Cardiology consulted given concern for cardiogenic shock. Bedside echo with hyperdynamic EF, SVO2 from port was 60, EF was preserved. Patient had no evidence of cardiogenic shock, no interventions needed. 11/5 the patient continues to have intermittent AF RVR, was restarted on amiodarone drip at 1.5. The patient developed junctional rhythm rate 40's and sinus pauses up to 4-6 seconds. Cardiology was consulted and recommended esmolol gtt.    Plan  Wean propofol as able (ARDS will likely require propofol)  Prefer fentanyl for bradycardia  Narrow complex   Does not need a TVP at this time.   Platelets 28>16>5; diagnosed with ITP 8/2022  Will consider re-evaluation for a pacemaker once acute illness improved if needed  Weaned off esmolol gtt . Only restart goal HR <120 and wean as much as tolerated. Goal to keep HR from 130-140s sustained or causing hypotension requiring vasopressors   Primary team limiting pressors given recent surgery concerns for healing/blood flow  Limit amiodarone off anticoagulation, restart anticoagulation once safely determined by primary team.  Consider pacer pads with back up rate 30 if continues to have bradycardia.  11/8 converted to NSR yesterday 11/7. Off esmolol. Last sinus pause 11/7 9am 3.7seconds as converted to sinus.    Case discussed with EP fellow and Dr. Cisneros. EP will sign off at this time.       Jamari Rosales MD  Cardiac Electrophysiology  [facility]

## 2022-11-08 NOTE — PROGRESS NOTES
[facility]  General Surgery  Progress Note    Subjective:     History of Present Illness:  No notes on file    Post-Op Info:  Procedure(s) (LRB):  XI ROBOTIC ESOPHAGECTOMY Converted to open at 1215  (N/A)  LYSIS, ADHESIONS  VATS, WITH WEDGE RESECTION, LUNG (Right)  CHOLECYSTECTOMY  ESOPHAGECTOMY   6 Days Post-Op     Interval History: Hypotensive last night and was on vasopressors for a few hours which have now been weaned off. Remains in sinus tach and esmolol has also been weaned off. Has not had any sinus pauses overnight or during the day yesterday. Remains on high vent settings 75/10 -> weaned PEEP this morning to 8. Was hypercarbic last night which has improved.     Medications:  Continuous Infusions:   sodium chloride 0.9% Stopped (11/08/22 0544)    sodium chloride 0.9% Stopped (11/07/22 1406)    esmolol Stopped (11/08/22 0158)    fentanyl 212.5 mcg/hr (11/08/22 0700)    NORepinephrine bitartrate-D5W Stopped (11/08/22 0505)    propofoL 45 mcg/kg/min (11/08/22 0702)    TPN ADULT CENTRAL LINE CUSTOM 40 mL/hr at 11/08/22 0700     Scheduled Meds:   dexamethasone (DECADRON) IVPB  40 mg Intravenous Daily    famotidine (PF)  20 mg Intravenous BID    Immune Globulin G (IGG)-PRO-IGA 10 % injection (Privigen)  60 g Intravenous Q24H    levalbuterol  0.63 mg Nebulization Q4H    mupirocin   Nasal BID    nicotine  1 patch Transdermal Daily    sodium chloride 0.9%  10 mL Intravenous Q6H    sodium chloride 3%  4 mL Nebulization Q8H     PRN Meds:calcium gluconate IVPB, calcium gluconate IVPB, calcium gluconate IVPB, fentaNYL, magnesium sulfate IVPB, magnesium sulfate IVPB, naloxone, ondansetron, potassium chloride in water **AND** potassium chloride in water **AND** potassium chloride in water, sodium chloride 0.9%, sodium chloride 0.9%, Flushing PICC Protocol **AND** sodium chloride 0.9% **AND** sodium chloride 0.9%     Review of patient's allergies indicates:   Allergen Reactions    Shellfish  containing products Nausea And Vomiting     All seafood    Topamax [topiramate] Nausea And Vomiting and Other (See Comments)     Vertigo       Objective:     Vital Signs (Most Recent):  Temp: 98.2 °F (36.8 °C) (11/08/22 0700)  Pulse: 99 (11/08/22 0700)  Resp: (!) 31 (11/08/22 0445)  BP: (!) 108/57 (11/08/22 0700)  SpO2: (!) 93 % (11/08/22 0700)   Vital Signs (24h Range):  Temp:  [97.7 °F (36.5 °C)-98.4 °F (36.9 °C)] 98.2 °F (36.8 °C)  Pulse:  [] 99  Resp:  [23-35] 31  SpO2:  [82 %-99 %] 93 %  BP: ()/(49-74) 108/57  Arterial Line BP: ()/(36-72) 117/44     Weight: 56.4 kg (124 lb 5.4 oz)  Body mass index is 21.34 kg/m².    Intake/Output - Last 3 Shifts         11/06 0700 11/07 0659 11/07 0700 11/08 0659 11/08 0700 11/09 0659    I.V. (mL/kg) 1495.8 (26.5) 1048.1 (18.6) 221.6 (3.9)    Blood       IV Piggyback 350 156.5 25    .5 958.2 40    Total Intake(mL/kg) 2804.3 (49.7) 2162.8 (38.3) 286.6 (5.1)    Urine (mL/kg/hr) 930 (0.7) 2060 (1.5) 120 (23.9)    Drains 0 2 0    Chest Tube 190 130 0    Total Output 1120 2192 120    Net +1684.3 -29.2 +166.6                   Physical Exam    Significant Labs:  I have reviewed all pertinent lab results within the past 24 hours.  CBC:   Recent Labs   Lab 11/08/22  0314 11/08/22  0317   WBC 12.57  --    RBC 2.42*  --    HGB 8.1*  --    HCT 23.9* 21*   PLT 5*  --    MCV 99*  --    MCH 33.5*  --    MCHC 33.9  --      CMP:   Recent Labs   Lab 11/08/22  0314   *  123*   CALCIUM 8.6*  8.6*   ALBUMIN 2.2*   PROT 4.9*   *  150*   K 3.6  3.6   CO2 25  25   *  112*   BUN 57*  57*   CREATININE 0.8  0.8   ALKPHOS 151*   ALT 55*   AST 99*   BILITOT 1.8*       Significant Diagnostics:  I have reviewed all pertinent imaging results/findings within the past 24 hours.    Assessment/Plan:     * Squamous cell esophageal cancer  Angelita Camara is a 61 y.o. female with history of thrombocytopenia, Afib, and tobacco use who is s/p [place] esophagectomy  on 11/2. Had previous gastric perforation complicated by takeback for abdominal abscess and gastroepiploic pedicle was not robust. However, conduit appeared well perfused. Had biopsy of concerning right pleural based lung lesion was negative for malignancy. No Jtube was placed due to adhesions.    - Neuro:    - Propofol and fentanyl  - CV:    - Has converted back into sinus at this time although with borderline tachycardia   - Do not start vasopressors unless communicated with surg onc first.    - Cards following for a fib with sinus pauses -> esmolol has been weaned off   - MAPs >65, SBP >100  - Respit   - ARDS with moderately high vent settings   - Wean as able. Wean PEEP first, then FiO2  - Renal   - Adequate UOP    - Rising BUN to 36 this morning (previously 23 -> 31 -> 57)   - FeNa consistent with intrinsic kidney injury. Cr remains WNL. Continue to monitor this.   - Gregory for strict I/O  - FEN   - Net pos 300 mL yesterday. Did receive lasix which she responded well from the UOP standpoint   - Has evidence of contraction alkalosis on this AM gas. Hold on diuresis for now given hypotension earlier   - Recheck lactic after albumin finishes this AM   - Sodium rising from 142 -> 150 in the last 24 hours. Likely partly related to hemoconcentration although may need to adjust TPN   - Maintain K>4, P>3, and Mg>2    - NPO, TPN  - GI   - Gastric conduit seems to be viable   - Cervical ARMINDA bulb to suction   - Daily CRP. Started to downtrend today 375 -> 323 -> 207.3   - LFTs increased today as well with AST/ALT 99/55 and Tbili 1.8. Will continue to monitor for now   - GI ppx  - Endo   - No hx of DM   - q6 POCT  - Heme   - Hx of thrombocytopenia and plts down to 5k this morning   - Started on dexamethasone 11/6 (plan for 4 days)   - IGg started today   - DVT ppx  - MSK   - PT/OT once able    - Dispo: Continue SICU care        Claritza Menezes MD  General Surgery  [facility]

## 2022-11-08 NOTE — SIGNIFICANT EVENT
Patient began having issues with oxygenation around 2pm today. Plan was to begin chemical paralysis to improve ventilator synchrony in setting of worsening ARDS. Prior to paralysis, developed unstable atrial fibrillation with RVR with rates into 160's. Underwent rapid cardioversion with rate improvement into 130's and blood pressure stabilized without vasopressor requirement. From this point forward, had severe hypoxia requiring manual bagging to maintain O2 sats in 80% range. Severely poor air movement noted on auscultation. I personally performed thoracic US without evidence of pneumothorax or pericardial effusion. Cardiac function appeared depressed. Copious foul smelling material suctioned frequently from ETT, raising concern for aspiration event. Bagging continued for nearly 1.5 hours with worsening hypoxia despite. At this point, O2 saturations were difficult to maintain at 50%. Discussed ongoing condition with her  and futility of any continued respiratory intervention. Ventilation halted and patient rapidly . My exam demonstrated no palpable pulses or audible breath or heart sounds. Time of death called at 4:13 PM.    Joseph Gray MD  Acute Care Surgery  [facility] [place] Department of Surgery

## 2022-11-08 NOTE — SUBJECTIVE & OBJECTIVE
Interval History: Hypotensive last night and was on vasopressors for a few hours which have now been weaned off. Remains in sinus tach and esmolol has also been weaned off. Has not had any sinus pauses overnight or during the day yesterday. Remains on high vent settings 75/10 -> weaned PEEP this morning to 8. Was hypercarbic last night which has improved.     Medications:  Continuous Infusions:   sodium chloride 0.9% Stopped (11/08/22 0544)    sodium chloride 0.9% Stopped (11/07/22 1406)    esmolol Stopped (11/08/22 0158)    fentanyl 212.5 mcg/hr (11/08/22 0700)    NORepinephrine bitartrate-D5W Stopped (11/08/22 0505)    propofoL 45 mcg/kg/min (11/08/22 0702)    TPN ADULT CENTRAL LINE CUSTOM 40 mL/hr at 11/08/22 0700     Scheduled Meds:   dexamethasone (DECADRON) IVPB  40 mg Intravenous Daily    famotidine (PF)  20 mg Intravenous BID    Immune Globulin G (IGG)-PRO-IGA 10 % injection (Privigen)  60 g Intravenous Q24H    levalbuterol  0.63 mg Nebulization Q4H    mupirocin   Nasal BID    nicotine  1 patch Transdermal Daily    sodium chloride 0.9%  10 mL Intravenous Q6H    sodium chloride 3%  4 mL Nebulization Q8H     PRN Meds:calcium gluconate IVPB, calcium gluconate IVPB, calcium gluconate IVPB, fentaNYL, magnesium sulfate IVPB, magnesium sulfate IVPB, naloxone, ondansetron, potassium chloride in water **AND** potassium chloride in water **AND** potassium chloride in water, sodium chloride 0.9%, sodium chloride 0.9%, Flushing PICC Protocol **AND** sodium chloride 0.9% **AND** sodium chloride 0.9%     Review of patient's allergies indicates:   Allergen Reactions    Shellfish containing products Nausea And Vomiting     All seafood    Topamax [topiramate] Nausea And Vomiting and Other (See Comments)     Vertigo       Objective:     Vital Signs (Most Recent):  Temp: 98.2 °F (36.8 °C) (11/08/22 0700)  Pulse: 99 (11/08/22 0700)  Resp: (!) 31 (11/08/22 0445)  BP: (!) 108/57 (11/08/22 0700)  SpO2: (!) 93 % (11/08/22 0700)    Vital Signs (24h Range):  Temp:  [97.7 °F (36.5 °C)-98.4 °F (36.9 °C)] 98.2 °F (36.8 °C)  Pulse:  [] 99  Resp:  [23-35] 31  SpO2:  [82 %-99 %] 93 %  BP: ()/(49-74) 108/57  Arterial Line BP: ()/(36-72) 117/44     Weight: 56.4 kg (124 lb 5.4 oz)  Body mass index is 21.34 kg/m².    Intake/Output - Last 3 Shifts         11/06 0700 11/07 0659 11/07 0700 11/08 0659 11/08 0700 11/09 0659    I.V. (mL/kg) 1495.8 (26.5) 1048.1 (18.6) 221.6 (3.9)    Blood       IV Piggyback 350 156.5 25    .5 958.2 40    Total Intake(mL/kg) 2804.3 (49.7) 2162.8 (38.3) 286.6 (5.1)    Urine (mL/kg/hr) 930 (0.7) 2060 (1.5) 120 (23.9)    Drains 0 2 0    Chest Tube 190 130 0    Total Output 1120 2192 120    Net +1684.3 -29.2 +166.6                   Physical Exam    Significant Labs:  I have reviewed all pertinent lab results within the past 24 hours.  CBC:   Recent Labs   Lab 11/08/22 0314 11/08/22 0317   WBC 12.57  --    RBC 2.42*  --    HGB 8.1*  --    HCT 23.9* 21*   PLT 5*  --    MCV 99*  --    MCH 33.5*  --    MCHC 33.9  --      CMP:   Recent Labs   Lab 11/08/22 0314   *  123*   CALCIUM 8.6*  8.6*   ALBUMIN 2.2*   PROT 4.9*   *  150*   K 3.6  3.6   CO2 25  25   *  112*   BUN 57*  57*   CREATININE 0.8  0.8   ALKPHOS 151*   ALT 55*   AST 99*   BILITOT 1.8*       Significant Diagnostics:  I have reviewed all pertinent imaging results/findings within the past 24 hours.

## 2022-11-08 NOTE — NURSING
MD Alec notified of O2 sat sustaining in the mid to low 80s. Multiple suction attempts. Sats respond to 100% FiO2 boost. See orders.

## 2022-11-08 NOTE — PT/OT/SLP PROGRESS
Occupational Therapy      Patient Name:  Angelita Camara   MRN:  [id number]    Patient not seen today secondary to pt intubated, remains inappropriate for OT eval this date. OT to monitor when medically stable for eval.     11/8/2022

## 2022-11-08 NOTE — NURSING
Decision made to obtain ABG d/t sats being in mid to upper 80s on 100 % FiO2. The following ABG was resulted.      Latest Reference Range & Units 11/08/22 14:04   POC PH 7.35 - 7.45  7.241 (LL)   POC PCO2 35 - 45 mmHg 65.5 (HH)   POC PO2 80 - 100 mmHg 69 (L)   POC HCO3 24 - 28 mmol/L 28.1 (H)   POC TCO2 23 - 27 mmol/L 30 (H)   POC SATURATED O2 95 - 100 % 89 (L)   Allens Test  N/A   POC BE -2 to 2 mmol/L 1   FiO2  100   Vt  370   PEEP  10   (LL): Data is critically low  (HH): Data is critically high  (L): Data is abnormally low  (H): Data is abnormally high    Dr. Fang notified and discussed with Dr. Gray. Decision made to start Nimbex. While preparing to paralyze and waiting for Nimbex gtt to arrive patient went into Afib with RVR became hypotensive and progressively more hypoxic. Team arrived to bedside, BP unable to tolerate Esmolol. Attempted Cardioversion 3 times with MDs at bedside. (See previous note). Patient remained afib, hypotensive and hypoxic. RT began bagging patient with 100% Fi02. Amio bolus x 2 and drip was administered. 1 L LR infused. Levophed gtt started. Patient never converted and HR remained >110. With Ambu bag sats would not maintain above 83% and worsened with vent.     Foul smelling brown fluid began coming out of mouth during this event. Dr. Polanco and Riri present.    Patient was bagged for over one hour by RT and MDs.  at bedside during entire event.  service present.  updated by Dr. Mackey and Dr. Gray. Decision was made by , Stas, not to perform compressions and to stop bagging patient. Sats at this time were reading as low as 38 on monitor.  ETT was removed by Dr. Polanco. Patient Asystole at 1613 and pronounced by Isaac KAUR.

## 2022-11-08 NOTE — PROGRESS NOTES
[facility]  Critical Care - Surgery  Progress Note    Patient Name: Angelita Camara  MRN: [id number]  Admission Date: 11/2/2022  Hospital Length of Stay: 6 days  Code Status: Full Code  Attending Provider: Melo Schafer MD  Primary Care Provider: Yaakov Dan II, MD   Principal Problem: Squamous cell esophageal cancer    Subjective:     Hospital/ICU Course:  62yo F with PMH HTN, PUD, current smoker, perforated gastric ulcer s/p Richard patch, and SCC of the esophagus now s/p robotic assisted esophagectomy with open abdominal portion on 11/2. Patient does not have enteral access. Robotic assisted esophagectomy with open abdominal portion on 11/2 - patient admitted directly to SICU after OR. Course complicated by patient being in a fib with RVR. Patient started on amio gtt. There were pauses in ECG sustained. Monitor reflecting rate 28. Amiodarone stopped evening of 11/5. Multi-function pads placed. Propofol paused, then restarted at lower dose. ECG rate recovered to junctional rhythm, rate 46. Cardiology at bedside evaluating patient and telemetry. Patient started on esmolol gtt. Esmolol gtt stopped due to hypotension and patient returning to NSR. Presumed diagnosis of ITP, platelets of 5 11/8, IVIG started. Briefly on levo 0.04 for a few hours overnight 11/7.       Interval History/Significant Events: Overnight, patient's sats decreased to mid 80s- multiple attempts at suction were made and patient responded to bump in FiO2 to 100%. Platelets of 5. On IVIG.     Follow-up For: Procedure(s) (LRB):  XI ROBOTIC ESOPHAGECTOMY Converted to open at 1215  (N/A)  LYSIS, ADHESIONS  VATS, WITH WEDGE RESECTION, LUNG (Right)  CHOLECYSTECTOMY  ESOPHAGECTOMY    Post-Operative Day: 6 Days Post-Op    Objective:     Vital Signs (Most Recent):  Temp: 98.2 °F (36.8 °C) (11/08/22 0700)  Pulse: 96 (11/08/22 0901)  Resp: (!) 29 (11/08/22 0901)  BP: (!) 108/58 (11/08/22 0900)  SpO2: (!) 92 % (11/08/22 0901)   Vital  Signs (24h Range):  Temp:  [97.7 °F (36.5 °C)-98.4 °F (36.9 °C)] 98.2 °F (36.8 °C)  Pulse:  [] 96  Resp:  [23-35] 29  SpO2:  [82 %-99 %] 92 %  BP: ()/(49-74) 108/58  Arterial Line BP: ()/(36-72) 123/51     Weight: 56.4 kg (124 lb 5.4 oz)  Body mass index is 21.34 kg/m².      Intake/Output Summary (Last 24 hours) at 11/8/2022 0916  Last data filed at 11/8/2022 0900  Gross per 24 hour   Intake 2459.35 ml   Output 2132 ml   Net 327.35 ml       Physical Exam  Vitals reviewed.   Constitutional:       Appearance: She is ill-appearing.   HENT:      Mouth/Throat:      Comments: ETT in place   Neck:      Comments: Left neck incision CDI  ARMINDA drain with minimal fluid.  Cardiovascular:      Rate and Rhythm: Normal rate and regular rhythm.      Pulses: Normal pulses.      Heart sounds: Normal heart sounds.   Pulmonary:      Effort: Pulmonary effort is normal.      Breath sounds: Normal breath sounds.   Chest:      Comments: Right tunneled portacath  CT in place on suction with SS output.  Abdominal:      General: Abdomen is flat. Bowel sounds are normal.      Palpations: Abdomen is soft.      Comments: Incisions CDI   Genitourinary:     Comments: Gregory in place  Skin:     General: Skin is warm.      Capillary Refill: Capillary refill takes less than 2 seconds.     Vents:  Vent Mode: A/C (11/08/22 0330)  Set Rate: 24 BPM (11/08/22 0330)  Vt Set: 370 mL (11/08/22 0330)  PEEP/CPAP: 10 cmH20 (11/08/22 0330)  Oxygen Concentration (%): 90 (11/08/22 0900)  Peak Airway Pressure: 31 cmH2O (11/08/22 0330)  Plateau Pressure: 28 cmH20 (11/08/22 0330)  Total Ve: 13 L/m (11/08/22 0330)  Negative Inspiratory Force (cm H2O): 0 (11/08/22 0330)  F/VT Ratio<105 (RSBI): 167.57 (11/08/22 0330)    Lines/Drains/Airways       Peripherally Inserted Central Catheter Line  Duration             PICC Triple Lumen 11/04/22 1120 right brachial 3 days              Central Venous Catheter Line  Duration             Port A Cath Single Lumen  11/03/22 2300 right subclavian 4 days              Drain  Duration                  Chest Tube 11/02/22 Right Midaxillary 24 Fr. 6 days         Closed/Suction Drain 11/02/22 1630 Left;Superior Chest Bulb 19 Fr. 5 days         Urethral Catheter 11/03/22 2208 Silicone 16 Fr. 4 days              Airway  Duration                  Airway - Non-Surgical 11/04/22 0209 Endotracheal Tube 4 days              Arterial Line  Duration             Arterial Line 11/06/22 0931 Right Femoral 1 day                    Significant Labs:    CBC/Anemia Profile:  Recent Labs   Lab 11/07/22  1700 11/07/22 2017 11/07/22  2058 11/07/22  2234 11/08/22  0022 11/08/22 0314 11/08/22 0317   WBC 14.23*  --  12.34  --   --  12.57  --    HGB 8.4*  --  9.1*  --   --  8.1*  --    HCT 24.8*   < > 26.2*   < > 24* 23.9* 21*   PLT 10*  --  9*  --   --  5*  --    MCV 98  --  99*  --   --  99*  --    RDW 21.6*  --  21.6*  --   --  21.2*  --     < > = values in this interval not displayed.        Chemistries:  Recent Labs   Lab 11/07/22  0355 11/07/22  1119 11/07/22  1654 11/08/22  0021 11/08/22  0314     142   < > 147* 144 150*  150*   K 4.3  4.3   < > 4.5 4.1 3.6  3.6     108   < > 110 111* 112*  112*   CO2 25  25   < > 27 24 25  25   BUN 36*  36*   < > 42* 51* 57*  57*   CREATININE 0.6  0.6   < > 0.7 0.7 0.8  0.8   CALCIUM 8.9  8.9   < > 9.2 9.0 8.6*  8.6*   ALBUMIN 2.8*  --   --   --  2.2*   PROT 5.6*  --   --   --  4.9*   BILITOT 1.4*  --   --   --  1.8*   ALKPHOS 99  --   --   --  151*   ALT 21  --   --   --  55*   AST 38  --   --   --  99*   MG 2.4   < > 2.2 2.1 2.1   PHOS 3.2  --   --   --  3.1    < > = values in this interval not displayed.       All pertinent labs within the past 24 hours have been reviewed.    Significant Imaging:  I have reviewed all pertinent imaging results/findings within the past 24 hours.    Assessment/Plan:     Paroxysmal atrial fibrillation    Neuro/Psych:   -- Sedation: propofol gtt, fentanyl  gtt   -- Pain: Dilaudid PRN             Cards:   -- Cards consulted for echo given low CI on flow track. Bedside echo demonstrated adequate contraction of all chambers without major abnormalities. Suspicion for cardiogenic pathology is low.   #A.fib  -- a.fib with RVR 11/6: cardiology consulted because patient gee'd down to 28 with pauses in EKG becoming more frequent, amio gtt stopped, patient back in a fib with RVR  -- now on esmolol gtt, but pts Bp continues to be labile. Will discuss with cardiology for additional recs  -- Esmolol gtt stopped       Pulm:   -- Vent - satting 80-93.  -- Wean as possible  -- Current smoker 1/2 PPD, COPD  -- Nicotine patch  -- Goal O2 sat > 90%  #CXR  -- Worsening bilateral pattern of opacity - SIRS vs. Edema, serial CXRs showing minimal improvement    -- optimizing vent settings today, increasing PEEP      Renal:  -- Gregory with strict I/Os  -- Stop mIV  -- BUN/Cr 51/0.7  -- No lasix today      FEN / GI:   -- Replace lytes as needed  -- Nutrition: TPN, pharmacy adjusting        ID:   -- Tm: afebrile; WBC 12.75  -- Abx none      Heme/Onc:   -- H/H stable 8.1/23.9  -- Daily CBC  #Thrombocytopenia  -- Received 1 unit of platelet 11/2, IVIG 11/8  -- Heme/Onc consulted for possible ITP.   - plt count continues to drop, now down to 5. Pt on decadron 40mg IV.     Endo:   -- Gluc goal 140-180  -- No hx DM      PPx:   Feeding: TPN  Analgesia/Sedation: See above  Thromboembolic prevention: lovenox   HOB >30: Yes  Stress Ulcer ppx: famotidine  Glucose control: Critical care goal 140-180 g/dl    Lines/Drains/Airway: Right CT, ETT, neck ARMINDA.       Dispo/Code Status/Palliative:   -- continue SICU care  / Full Code           Torrie Ruiz MD  Critical Care - Surgery  [facility]

## 2022-11-08 NOTE — PHYSICIAN QUERY
"PT Name: Angelita Camara  MR #: [id number]    DOCUMENTATION CLARIFICATION      CDS: Nena LUNDY RN  Contact information: [email or web address]      This form is a permanent document in the medical record.    Query Date: November 8, 2022    By submitting this query, we are merely seeking further clarification of documentation. Please utilize your independent clinical judgment when addressing the question(s) below.    The medical record contains the following:   Indicators   Supporting Clinical Findings Location in Medical Record   X "Pulmonary Edema" - Hypoxic respiratory failure: will wean O2 as able. May need a bronch to ensure no mucus plugging. Has significant interstitial markings consistent with pulmonary edema on CXR.  - Heart failure: STAT echo and cardiology consult. Likely contributing to pulmonary edema Sig Event Surg Onc 11/4/2022    Wheezing, Productive cough, SOB, GALLAGHER, Use of accessory muscles     X Radiology Findings Persistent abnormal bilateral pattern of pulmonary opacities with progression, particularly on the left.      Her chest x-rays shows bilateral pulmonary edema/infiltrates.  An echo done today showed normal EF.  She is a smoker so I feel like this is a combination of probably pulmonary edema versus early ARDS from SIRS after her surgery. Chest X-Ray 11/4/2022      PN CCS 11/4/2022   X CHF documented/Respiratory Failure documented Acute hypoxemic respiratory failure    Multiple issues overnight including urinary retention with villegas placement, hypoxic respiratory failure secondary to fluid overload now intubated, hypotension requiring short period of vasopressors, metabolic acidosis, a fib with RVR, and concern for heart failure with cardiology consulted.  PN CCS 11/4/2022    PN Gen Surg 11/4/2022   X Respiratory condition - ARDS with moderately high vent settings    -- Current smoker 1/2 PPD, COPD PN Gen Surg 11/7/2022    PN CCS 11/4/2022   X RR                  PaO2                  PaCO2     "                 O2 sat  11/03/22 22:34 11/04/22 01:48 11/04/22 03:05 11/04/22 04:28   POC PCO2 32.2 (L) 38.0 38.9 47.2 (H)   POC PO2 67 (L) 49 (L) 66 (L) 35 (L)   POC SATURATED O2 93 (L) 82 (L) 90 (L) 61 (L)     Vital Signs (24h Range):  Resp:  [5-36] 25  SpO2:  [79 %-100 %] 94 %    Pt also becoming tachypnic overnight, with subsequent alkalosis showing on serial ABGs. Started on fentanyl infusion overnight to help suppress respiratory drive, alkalosis corrected with slowing of respiratory rate Point of Care Labs 11/3-11/4                PN CCS 11/4/2022        PN CCS 11/5/2022   X Treatment:         - Wean vent settings as able. Ideally would get her extubated today, but given pulm edema will be cautious with this.     - 20mg Lasix q6 today with labs q6 as well PN Gen Surg 11/4/2022      PN Gen Surg 11/5/2022   X Supplemental O2: Throughout the night however, she has had increasing O2 requirements and is now intubated on high vent settings (FiO2 100%, PEEP 7). She has ongoing evidence of hypoxia on her ABG along with metabolic acidosis. Sig Event Surg Onc 11/4/2022    Oxygen dependence      Other:         Provider, please specify diagnosis or diagnoses associated with above clinical findings.    [    ] Acute pulmonary edema, cardiac cause (please specify cardiac condition): ___________________   [ xxx   ] Acute pulmonary edema, non-cardiac cause (please specify causative condition): __adult respiratory distress syndrome (ARDS)_________________   [    ] Acute pulmonary edema, unspecified cause   [    ] Acute and/on chronic pulmonary edema, cardiac cause (please specify cardiac condition): ___________________   [    ] Acute and/on chronic pulmonary edema, non-cardiac cause (please specify causative condition): ___________________   [    ] Acute and/on chronic pulmonary edema, unspecified cause   [    ] Other respiratory diagnosis (please specify): _________________________________    [   ] Clinically Undetermined          Please document in your progress notes daily for the duration of treatment, until resolved, and include in your discharge summary.

## 2022-11-08 NOTE — ASSESSMENT & PLAN NOTE
  Neuro/Psych:   -- Sedation: propofol gtt, fentanyl gtt   -- Pain: Dilaudid PRN             Cards:   -- Cards consulted for echo given low CI on flow track. Bedside echo demonstrated adequate contraction of all chambers without major abnormalities. Suspicion for cardiogenic pathology is low.   #A.fib  -- a.fib with RVR 11/6: cardiology consulted because patient gee'd down to 28 with pauses in EKG becoming more frequent, amio gtt stopped, patient back in a fib with RVR  -- now on esmolol gtt, but pts Bp continues to be labile. Will discuss with cardiology for additional recs  -- Esmolol gtt stopped       Pulm:   -- Vent - satting 80-93.  -- Wean as possible  -- Current smoker 1/2 PPD, COPD  -- Nicotine patch  -- Goal O2 sat > 90%  #CXR  -- Worsening bilateral pattern of opacity - SIRS vs. Edema, serial CXRs showing minimal improvement    -- optimizing vent settings today, increasing PEEP      Renal:  -- Gregory with strict I/Os  -- Stop mIV  -- BUN/Cr 51/0.7  -- No lasix today      FEN / GI:   -- Replace lytes as needed  -- Nutrition: TPN, pharmacy adjusting        ID:   -- Tm: afebrile; WBC 12.75  -- Abx none      Heme/Onc:   -- H/H stable 8.1/23.9  -- Daily CBC  #Thrombocytopenia  -- Received 1 unit of platelet 11/2, IVIG 11/8  -- Heme/Onc consulted for possible ITP.   - plt count continues to drop, now down to 5. Pt on decadron 40mg IV.     Endo:   -- Gluc goal 140-180  -- No hx DM      PPx:   Feeding: TPN  Analgesia/Sedation: See above  Thromboembolic prevention: lovenox   HOB >30: Yes  Stress Ulcer ppx: famotidine  Glucose control: Critical care goal 140-180 g/dl    Lines/Drains/Airway: Right CT, ETT, neck ARMINDA.       Dispo/Code Status/Palliative:   -- continue SICU care  / Full Code

## 2022-11-08 NOTE — PLAN OF CARE
"      SICU PLAN OF CARE NOTE    Dx:  Esophagectomy     Shift Events: Vent settings adjusted. Levo started. Esmolol d/c.     Goals of Care: MAP > 65; HR < 120; O2 sat > 88%    Neuro: Sedated, Arouses to Voice, and Moves All Extremities, Pupils equal and reactive     Cardiac: Sinus rhythm 70-90s. See flowsheet.     Vital Signs: BP (!) 74/49 (BP Location: Left arm, Patient Position: Lying) Comment: MD aware  Pulse 88   Temp 98.4 °F (36.9 °C) (Oral)   Resp (!) 28   Ht 5' 4" (1.626 m)   Wt 56.4 kg (124 lb 5.4 oz)   SpO2 (!) 91%   Breastfeeding No   BMI 21.34 kg/m²     Respiratory: Ventilator AC/VC+     Diet: TPN    Gtts: Propfol, Fentanyl, and Norepinephrine    Urine Output: Urinary Catheter  cc/hour    Drains: ARMINDA Drain, total output 2 cc / shift  Chest Tube, total output cc/shift     Labs/Accuchecks: Daily labs. BMP, Mag q6h. Accuchecks q6h.    Skin: No skin breakdown noted throughout shift. Waffle inflated. Turned q2h. Heel and sacral foams in place and in tact. Abdominal incision and chest incision PANKAJ. See flowsheets.            "

## 2022-11-08 NOTE — PLAN OF CARE
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 She was dx with ITP in 8/2022 due to severe thrombocytopenia given dex/IVIG with modest improvement.     Presumed dx of ITP.   Recommend to administer dex 40mg IV push x 4 days. Today is day 3.  IVIG 1g/kg x 2 days. Today is day 1.     Will continue to monitor counts.    Transfuse platelets for clinically significant bleeding.    We will follow counts. Please call for qs.    Mara De Anda MD  Hematology/Oncology Fellow PGY V  [facility]

## 2022-11-08 NOTE — ASSESSMENT & PLAN NOTE
61 y.o. female with esophagectomy on 11/2/22. Post op she was moved to SICU. Hospital course complicated by AF with RVR, on amio. Also acute hypoxic respiratory failure due to ARDS requiring intubation. On morning of 11/4, patient developed worsening hypotension requiring pressors. Flow track with low cardiac output. Cardiology consulted given concern for cardiogenic shock. Bedside echo with hyperdynamic EF, SVO2 from port was 60, EF was preserved. Patient had no evidence of cardiogenic shock, no interventions needed. 11/5 the patient continues to have intermittent AF RVR, was restarted on amiodarone drip at 1.5. The patient developed junctional rhythm rate 40's and sinus pauses up to 4-6 seconds. Cardiology was consulted and recommended esmolol gtt.    Plan  Wean propofol as able (ARDS will likely require propofol)  Prefer fentanyl for bradycardia  Narrow complex   Does not need a TVP at this time.   Platelets 28>16>5; diagnosed with ITP 8/2022  Will consider re-evaluation for a pacemaker once acute illness improved if needed  Weaned off esmolol gtt . Only restart goal HR <120 and wean as much as tolerated. Goal to keep HR from 130-140s sustained or causing hypotension requiring vasopressors   Primary team limiting pressors given recent surgery concerns for healing/blood flow  Limit amiodarone off anticoagulation, restart anticoagulation once safely determined by primary team.  Consider pacer pads with back up rate 30 if continues to have bradycardia.  11/8 converted to NSR yesterday 11/7. Off esmolol. Last sinus pause 11/7 9am 3.7seconds as converted to sinus.    Case discussed with EP fellow and Dr. Cisneros. EP will sign off at this time.

## 2022-11-08 NOTE — ASSESSMENT & PLAN NOTE
Angelita Camara is a 61 y.o. female with history of thrombocytopenia, Afib, and tobacco use who is s/p [place] esophagectomy on 11/2. Had previous gastric perforation complicated by takeback for abdominal abscess and gastroepiploic pedicle was not robust. However, conduit appeared well perfused. Had biopsy of concerning right pleural based lung lesion was negative for malignancy. No Jtube was placed due to adhesions.    - Neuro:    - Propofol and fentanyl  - CV:    - Has converted back into sinus at this time although with borderline tachycardia   - Do not start vasopressors unless communicated with surg onc first.    - Cards following for a fib with sinus pauses -> esmolol has been weaned off   - MAPs >65, SBP >100  - Respit   - ARDS with moderately high vent settings   - Wean as able. Wean PEEP first, then FiO2  - Renal   - Adequate UOP    - Rising BUN to 36 this morning (previously 23 -> 31 -> 57)   - FeNa consistent with intrinsic kidney injury. Cr remains WNL. Continue to monitor this.   - Gregory for strict I/O  - FEN   - Net pos 300 mL yesterday. Did receive lasix which she responded well from the UOP standpoint   - Has evidence of contraction alkalosis on this AM gas. Hold on diuresis for now given hypotension earlier   - Sodium rising from 142 -> 150 in the last 24 hours. Likely partly related to hemoconcentration although may need to adjust TPN   - Maintain K>4, P>3, and Mg>2    - NPO, TPN  - GI   - Gastric conduit seems to be viable   - Cervical ARMINDA bulb to suction   - Daily CRP. Started to downtrend today 375 -> 323 -> 207.3   - LFTs increased today as well with AST/ALT 99/55 and Tbili 1.8. Will continue to monitor for now   - GI ppx  - Endo   - No hx of DM   - q6 POCT  - Heme   - Hx of thrombocytopenia and plts down to 5k this morning   - Started on dexamethasone 11/6 (plan for 4 days)   - IGg started today   - DVT ppx  - MSK   - PT/OT once able    - Dispo: Continue SICU care

## 2022-11-08 NOTE — PT/OT/SLP PROGRESS
Physical Therapy      Patient Name:  Angelita Camara   MRN:  [id number]    Patient not seen today secondary to  (intubated and not medically appropriate for progressive mobility at this time. RN recommending f/u in 2 days. PT to f/u on 11/10/22 as appropriate).

## 2022-11-08 NOTE — SUBJECTIVE & OBJECTIVE
Interval History/Significant Events: Overnight, patient's sats decreased to mid 80s- multiple attempts at suction were made and patient responded to bump in FiO2 to 100%. Platelets of 5. On IVIG.     Follow-up For: Procedure(s) (LRB):  XI ROBOTIC ESOPHAGECTOMY Converted to open at 1215  (N/A)  LYSIS, ADHESIONS  VATS, WITH WEDGE RESECTION, LUNG (Right)  CHOLECYSTECTOMY  ESOPHAGECTOMY    Post-Operative Day: 6 Days Post-Op    Objective:     Vital Signs (Most Recent):  Temp: 98.2 °F (36.8 °C) (11/08/22 0700)  Pulse: 96 (11/08/22 0901)  Resp: (!) 29 (11/08/22 0901)  BP: (!) 108/58 (11/08/22 0900)  SpO2: (!) 92 % (11/08/22 0901)   Vital Signs (24h Range):  Temp:  [97.7 °F (36.5 °C)-98.4 °F (36.9 °C)] 98.2 °F (36.8 °C)  Pulse:  [] 96  Resp:  [23-35] 29  SpO2:  [82 %-99 %] 92 %  BP: ()/(49-74) 108/58  Arterial Line BP: ()/(36-72) 123/51     Weight: 56.4 kg (124 lb 5.4 oz)  Body mass index is 21.34 kg/m².      Intake/Output Summary (Last 24 hours) at 11/8/2022 0916  Last data filed at 11/8/2022 0900  Gross per 24 hour   Intake 2459.35 ml   Output 2132 ml   Net 327.35 ml       Physical Exam  Vitals reviewed.   Constitutional:       Appearance: She is ill-appearing.   HENT:      Mouth/Throat:      Comments: ETT in place   Neck:      Comments: Left neck incision CDI  ARMINDA drain with minimal fluid.  Cardiovascular:      Rate and Rhythm: Normal rate and regular rhythm.      Pulses: Normal pulses.      Heart sounds: Normal heart sounds.   Pulmonary:      Effort: Pulmonary effort is normal.      Breath sounds: Normal breath sounds.   Chest:      Comments: Right tunneled portacath  CT in place on suction with SS output.  Abdominal:      General: Abdomen is flat. Bowel sounds are normal.      Palpations: Abdomen is soft.      Comments: Incisions CDI   Genitourinary:     Comments: Gregory in place  Skin:     General: Skin is warm.      Capillary Refill: Capillary refill takes less than 2 seconds.     Vents:  Vent Mode:  A/C (11/08/22 0330)  Set Rate: 24 BPM (11/08/22 0330)  Vt Set: 370 mL (11/08/22 0330)  PEEP/CPAP: 10 cmH20 (11/08/22 0330)  Oxygen Concentration (%): 90 (11/08/22 0900)  Peak Airway Pressure: 31 cmH2O (11/08/22 0330)  Plateau Pressure: 28 cmH20 (11/08/22 0330)  Total Ve: 13 L/m (11/08/22 0330)  Negative Inspiratory Force (cm H2O): 0 (11/08/22 0330)  F/VT Ratio<105 (RSBI): 167.57 (11/08/22 0330)    Lines/Drains/Airways       Peripherally Inserted Central Catheter Line  Duration             PICC Triple Lumen 11/04/22 1120 right brachial 3 days              Central Venous Catheter Line  Duration             Port A Cath Single Lumen 11/03/22 2300 right subclavian 4 days              Drain  Duration                  Chest Tube 11/02/22 Right Midaxillary 24 Fr. 6 days         Closed/Suction Drain 11/02/22 1630 Left;Superior Chest Bulb 19 Fr. 5 days         Urethral Catheter 11/03/22 2208 Silicone 16 Fr. 4 days              Airway  Duration                  Airway - Non-Surgical 11/04/22 0209 Endotracheal Tube 4 days              Arterial Line  Duration             Arterial Line 11/06/22 0931 Right Femoral 1 day                    Significant Labs:    CBC/Anemia Profile:  Recent Labs   Lab 11/07/22  1700 11/07/22  2017 11/07/22 2058 11/07/22  2234 11/08/22  0022 11/08/22  0314 11/08/22  0317   WBC 14.23*  --  12.34  --   --  12.57  --    HGB 8.4*  --  9.1*  --   --  8.1*  --    HCT 24.8*   < > 26.2*   < > 24* 23.9* 21*   PLT 10*  --  9*  --   --  5*  --    MCV 98  --  99*  --   --  99*  --    RDW 21.6*  --  21.6*  --   --  21.2*  --     < > = values in this interval not displayed.        Chemistries:  Recent Labs   Lab 11/07/22  0355 11/07/22  1119 11/07/22  1654 11/08/22  0021 11/08/22  0314     142   < > 147* 144 150*  150*   K 4.3  4.3   < > 4.5 4.1 3.6  3.6     108   < > 110 111* 112*  112*   CO2 25  25   < > 27 24 25  25   BUN 36*  36*   < > 42* 51* 57*  57*   CREATININE 0.6  0.6   < > 0.7 0.7  0.8  0.8   CALCIUM 8.9  8.9   < > 9.2 9.0 8.6*  8.6*   ALBUMIN 2.8*  --   --   --  2.2*   PROT 5.6*  --   --   --  4.9*   BILITOT 1.4*  --   --   --  1.8*   ALKPHOS 99  --   --   --  151*   ALT 21  --   --   --  55*   AST 38  --   --   --  99*   MG 2.4   < > 2.2 2.1 2.1   PHOS 3.2  --   --   --  3.1    < > = values in this interval not displayed.       All pertinent labs within the past 24 hours have been reviewed.    Significant Imaging:  I have reviewed all pertinent imaging results/findings within the past 24 hours.

## 2022-11-08 NOTE — PLAN OF CARE
EP Plan of Care    Called that patient was hypoxic and being bagged by respiratory. AFib RVR in 160s with MAPs in 50s. Had been cardioverted unsuccessfully with 120J, 200J, 200J. On arrival, HR 130s with MAPs 55-60. Levophed started. Amiodarone 1mg/ml started after bolus 150. Worsening acidosis prior to going back into AFib RVR with pH 7.19 and worsening gas exchange with pCO2 70 and O2 sats 70s on 100% FiO2.    Discussed with SICU team and put 2 sets of pads on and attempted to cardiovert at 200J each. No change in rhythm or HR. 150mg of amiodarone bolused again.    SICU team concerned for aspiration or bronchopulmonary fistula.     AFib RVR secondary to primary metabolic and respiratory issues.     Discussed at bedside with EP fellow and Dr. Cisneros with SICU team and staff Dr. Gray.    Jamari Rosales MD  Cardiac Electrophysiology PGY-4  [facility]

## 2022-11-08 NOTE — CARE UPDATE
Patient cardioverted 1x at 120 joules. Cardioverted x 2 with 200 joules. Patient still in Atrial fibrillation. Will start amiodarone bolus and drip.

## 2022-11-08 NOTE — SUBJECTIVE & OBJECTIVE
Objective:     Vital Signs (Most Recent):  Temp: 98.2 °F (36.8 °C) (11/08/22 0700)  Pulse: 97 (11/08/22 0745)  Resp: (!) 31 (11/08/22 0445)  BP: (!) 108/57 (11/08/22 0700)  SpO2: (!) 90 % (11/08/22 0745)   Vital Signs (24h Range):  Temp:  [97.7 °F (36.5 °C)-98.4 °F (36.9 °C)] 98.2 °F (36.8 °C)  Pulse:  [] 97  Resp:  [23-35] 31  SpO2:  [82 %-99 %] 90 %  BP: ()/(49-74) 108/57  Arterial Line BP: ()/(36-72) 117/43   Weight: 56.4 kg (124 lb 5.4 oz)  Body mass index is 21.34 kg/m².  SpO2: (!) 90 %  O2 Device (Oxygen Therapy): ventilator    Intake/Output Summary (Last 24 hours) at 11/8/2022 0757  Last data filed at 11/8/2022 0702  Gross per 24 hour   Intake 2323.45 ml   Output 2147 ml   Net 176.45 ml     Physical Exam  Vitals reviewed.   Constitutional:       Appearance: She is ill-appearing.   HENT:      Head: Normocephalic.   Neck:      Comments: No JVD  Cardiovascular:      Rate and Rhythm: Normal rate and regular rhythm.      Pulses: Normal pulses.   Pulmonary:      Comments: intubated  Musculoskeletal:      Right lower leg: No edema.      Left lower leg: No edema.   Skin:     Capillary Refill: Capillary refill takes less than 2 seconds.     Significant Labs: Reviewed

## 2022-11-09 LAB — PA IGG BLD QL FC: NEGATIVE

## 2022-11-09 NOTE — PLAN OF CARE
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 [place] complicated with ARDS on mechanical ventilation. She was dx with ITP in 2022 due to severe thrombocytopenia given dex/IVIG with modest improvement.     Chart reviewed. Patient with worsening hypoxic respiratory failure due to severe ARDS.  yesterday.       Mara De Anda MD  Hematology/Oncology Fellow PGY V  [facility]

## 2022-11-09 NOTE — PLAN OF CARE
[facility] - Surgical Intensive Care  Discharge Final Note    Primary Care Provider: Yaakov Dan II, MD    Expected Discharge Date: 2022    Final Discharge Note (most recent)       Final Note - 22 0845          Final Note    Assessment Type Final Discharge Note     Anticipated Discharge Disposition                      Important Message from Medicare Kinyada Foots, LMSW  Case Management [facility]  Ext: [phone]

## 2022-11-15 LAB
FINAL PATHOLOGIC DIAGNOSIS: NORMAL
FROZEN SECTION DIAGNOSIS: NORMAL
FROZEN SECTION FOOTNOTE: NORMAL
GROSS: NORMAL
Lab: NORMAL

## 2022-11-16 PROBLEM — J80 ARDS (ADULT RESPIRATORY DISTRESS SYNDROME): Status: ACTIVE | Noted: 2022-11-16

## 2022-11-16 NOTE — HOSPITAL COURSE
Ms. Camara presented for a robotic assisted total thoracic esophagectomy with lung biopsy on 11/2/2022. The surgery was technically difficult given her previous history of gastric ulcer perforation and required the abdominal portion to be completed in an open fashion. However, it proceeded uneventfully overall. She was able to be extubated and was taken to the PACU for recover from anesthesia. She was noted to have low plts post op and so received 1u plts without much of a response consistent with the ongoing concern for possible ITP. She spent the night and most of POD#1  in PACU due to limited capacity in the SICU, but overall seemed to be progressing well. However, that evening, when she moved up to the SICU, she was noted to have multiple issues including urinary retention, increasing O2 requirements, and a fib with RVR. A villegas was placed and an amio bolus was administered. She started to become hypotensive with a metabolic acidosis and so fluids and vasopressors were started. Given her need for aggressive fluid resuscitation and her already high oxygen requirements, the decision was made to intubate her. A flotrack was set up which was concerning with elevated SVV and CI <2 prompting cardiology consult. As her acidosis corrected, much of her other issues started to resolve with her CI improving and her returning to sinus rhythm. She was able to be weaned off vasopressor support, but still had high O2 requirements.     The following morning both her Hgb and plts were noted to be downtrending. She was given blood and hematology recommended starting steroids for suspected ITP. She had ongoing high O2 requirements and was now felt to have a component of ARDS so we started to diurese her. She continued to have pAF and so cardiology transitioned her from amiodarone to esmolol. She started to have sinus pauses with this but it was felt she did not require TVP. On 11/8 (POD#6) she was noted to have even worsening  thrombocytopenia and so was started on IgG. She continued to have episodes of hemodynamic instability and there was ongoing concern about her risk of conduit necrosis as a result of this. This was discussed with her  in depth. Unfortunately, in the afternoon she became even more difficult to oxygenate and ventilate even with manual bagging. The plan was to paralyze her to improve vent dyssynchrony but prior to the she entered into unstable a fib with RVR. Cardiology came to bedside attempted cardioversion which was not successful. During this time she was also noted to have foul smelling material being suctioned from her mouth raising concern for possible aspiration. We discussed the futility of the situation with her . Respiratory support was ceased and the patient  shortly after.

## 2022-11-16 NOTE — DISCHARGE SUMMARY
[facility] - Surgical Intensive Care  General Surgery  Discharge Summary      Patient Name: Angelita Camara  MRN: [id number]  Admission Date: 11/2/2022  Hospital Length of Stay: 6 days  Discharge Date and Time:  11/16/2022 9:55 AM  Attending Physician: No att. providers found   Discharging Provider: Claritza Menezes MD  Primary Care Provider: Yaakov Dan II, MD    HPI:   Angelita Camara is a 61 y.o. 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 Her neoadjuvant therapy course was complicated by thrombocytopenia suspicious for ITP with recurrent episodes of epistaxis. Her most recent PET  was noted to have PET avid lung nodules s/p IR biopsy which did not show evidence of metastatic disease. She also has new onset a fib for which she recently started on amiodarone. The risks, benefits, and alternatives of the procedure have been previously discussed and she is amenable to proceeding.       Procedure(s) (LRB):  XI ROBOTIC ESOPHAGECTOMY Converted to open at 1215  (N/A)  LYSIS, ADHESIONS  VATS, WITH WEDGE RESECTION, LUNG (Right)  CHOLECYSTECTOMY  ESOPHAGECTOMY      Indwelling Lines/Drains at time of discharge:   Lines/Drains/Airways     Peripherally Inserted Central Catheter Line  Duration           PICC Triple Lumen 11/04/22 1120 right brachial 11 days          Central Venous Catheter Line  Duration           Port A Cath Single Lumen 11/03/22 2300 right subclavian 12 days              Hospital Course: Ms. Camara presented for a robotic assisted total thoracic esophagectomy with lung biopsy on 11/2/2022. The surgery was technically difficult given her previous history of gastric ulcer perforation and required the abdominal portion to be completed in an open fashion. However, it proceeded uneventfully overall. She was able to be extubated and was taken to the PACU for recover from  anesthesia. She was noted to have low plts post op and so received 1u plts without much of a response consistent with the ongoing concern for possible ITP. She spent the night and most of POD#1  in PACU due to limited capacity in the SICU, but overall seemed to be progressing well. However, that evening, when she moved up to the SICU, she was noted to have multiple issues including urinary retention, increasing O2 requirements, and a fib with RVR. A villegas was placed and an amio bolus was administered. She started to become hypotensive with a metabolic acidosis and so fluids and vasopressors were started. Given her need for aggressive fluid resuscitation and her already high oxygen requirements, the decision was made to intubate her. A flotrack was set up which was concerning with elevated SVV and CI <2 prompting cardiology consult. As her acidosis corrected, much of her other issues started to resolve with her CI improving and her returning to sinus rhythm. She was able to be weaned off vasopressor support, but still had high O2 requirements.     The following morning both her Hgb and plts were noted to be downtrending. She was given blood and hematology recommended starting steroids for suspected ITP. She had ongoing high O2 requirements and was now felt to have a component of ARDS so we started to diurese her. She continued to have pAF and so cardiology transitioned her from amiodarone to esmolol. She started to have sinus pauses with this but it was felt she did not require TVP. On 11/8 (POD#6) she was noted to have even worsening thrombocytopenia and so was started on IgG. She continued to have episodes of hemodynamic instability and there was ongoing concern about her risk of conduit necrosis as a result of this. This was discussed with her  in depth. Unfortunately, in the afternoon she became even more difficult to oxygenate and ventilate even with manual bagging. The plan was to paralyze her to improve  vent dyssynchrony but prior to the she entered into unstable a fib with RVR. Cardiology came to bedside attempted cardioversion which was not successful. During this time she was also noted to have foul smelling material being suctioned from her mouth raising concern for possible aspiration. We discussed the futility of the situation with her . Respiratory support was ceased and the patient  shortly after.       Goals of Care Treatment Preferences:  Code Status: Full Code      Consults:   Consults (From admission, onward)        Status Ordering Provider     Inpatient consult to Electrophysiology  Once        Provider:  (Not yet assigned)    Completed SELMA EL     Inpatient consult to Cardiology  Once        Provider:  (Not yet assigned)    Completed SELMA EL     Inpatient consult to Cardiology  Once        Provider:  (Not yet assigned)    Completed BOBBY VIEIRA     Inpatient consult to PICC team ([facility])  Once        Provider:  (Not yet assigned)    Completed THEA BOLANOS     Inpatient consult to Cardiology  Once        Provider:  (Not yet assigned)    Completed DES TEAGUE     Inpatient consult to Hematology/Oncology  Once        Provider:  (Not yet assigned)    Completed SELMA EL     Inpatient consult to Registered Dietitian/Nutritionist  Once        Provider:  (Not yet assigned)    Completed BOBBY VIEIRA          Significant Diagnostic Studies: Labs: All labs within the past 24 hours have been reviewed    Pending Diagnostic Studies:     None        Final Active Diagnoses:    Diagnosis Date Noted POA    PRINCIPAL PROBLEM:  Squamous cell esophageal cancer [C15.9] 2022 Yes    Acute hypoxemic respiratory failure [J96.01] 2022 No    H/O esophagectomy [Z98.890, Z90.49] 2022 Not Applicable    Calculus of gallbladder without biliary obstruction [K80.20] 2022 Yes    Thrombocytopenia [D69.6] 2022 Yes    Paroxysmal atrial fibrillation  [I48.0] 2022 Yes    Moderate malnutrition [E44.0] 2022 Yes    PUD (peptic ulcer disease) [K27.9] 2016 Yes      Problems Resolved During this Admission:    Diagnosis Date Noted Date Resolved POA    Shock [R57.9] 2022 No      Discharged Condition:     Disposition:     Follow Up:    Patient Instructions:   No discharge procedures on file.  Medications:  None  Time spent on the discharge of patient: 15 minutes    Claritza Menezes MD  General Surgery  [facility] - Surgical Intensive Care

## 2022-11-16 NOTE — HPI
Angelita Camara is a 61 y.o. 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 Her neoadjuvant therapy course was complicated by thrombocytopenia suspicious for ITP with recurrent episodes of epistaxis. Her most recent PET  was noted to have PET avid lung nodules s/p IR biopsy which did not show evidence of metastatic disease. She also has new onset a fib for which she recently started on amiodarone. The risks, benefits, and alternatives of the procedure have been previously discussed and she is amenable to proceeding.

## (undated) DEVICE — DRAIN CHANNEL ROUND 19FR

## (undated) DEVICE — TUBE SET SINGLE LUMEN FILTERED

## (undated) DEVICE — SUT 3-0 12-18IN SILK

## (undated) DEVICE — RELOAD SUREFORM 45 3.5 BLU 6R

## (undated) DEVICE — PACK DRAPE UNIVERSAL CONVERTOR

## (undated) DEVICE — SUT MCRYL PLUS 4-0 PS2 27IN

## (undated) DEVICE — SUT SILK 2-0 SH 18IN BLACK

## (undated) DEVICE — BLADE SURG CARBON STEEL SZ11

## (undated) DEVICE — TRAY CATH FOL SIL URIMTR 16FR

## (undated) DEVICE — STAPLER ENDO GIA ULT UNIV 12MM

## (undated) DEVICE — SUT CHROMIC 2-0 SH 27IN BRN

## (undated) DEVICE — CANNULA SEAL 12MM

## (undated) DEVICE — RELOAD ENDO GIA TRISTAPLE 45MM

## (undated) DEVICE — SOL NS 1000CC

## (undated) DEVICE — Device

## (undated) DEVICE — SPONGE DERMACEA GAUZE 4X4

## (undated) DEVICE — GOWN SMART IMP BREATHABLE XXLG

## (undated) DEVICE — COVER LIGHT HANDLE

## (undated) DEVICE — INTRODUCER LEAD 14FR

## (undated) DEVICE — SUT 1 48IN PDS II VIO MONO

## (undated) DEVICE — SEAL UNIVERSAL 5MM-8MM XI

## (undated) DEVICE — SUT 0 VICRYL / UR6 (J603)

## (undated) DEVICE — RELOAD SUREFORM 45 2.5 WHT 6R

## (undated) DEVICE — DRAPE ARM DAVINCI XI

## (undated) DEVICE — DRAIN CHEST DRY SUCTION

## (undated) DEVICE — LUBRICANT SURGILUBE 2 OZ

## (undated) DEVICE — SUT PROLENE 3-0 SH DA 36 BL

## (undated) DEVICE — SEE MEDLINE ITEM 156902

## (undated) DEVICE — NDL INSUF ULTRA VERESS 120MM

## (undated) DEVICE — DRAPE COLUMN DAVINCI XI

## (undated) DEVICE — STAPLER SUREFORM SGL USE 45

## (undated) DEVICE — EVACUATOR WOUND BULB 100CC

## (undated) DEVICE — CANNULA REDUCER 12-8MM

## (undated) DEVICE — STAPLER SUREFORM CRVD TIP 45

## (undated) DEVICE — ADHESIVE DERMABOND ADVANCED

## (undated) DEVICE — SOL CLEARIFY VISUALIZATION LAP

## (undated) DEVICE — DRAIN CHAN RND HUBLS 8MM 24FR

## (undated) DEVICE — SEALER VESSEL DAVINCI XI

## (undated) DEVICE — SUT VICRYL 3-0 27 SH

## (undated) DEVICE — TUBE PENROSE DRAIN 12IN X 5/8I

## (undated) DEVICE — SUT SILK 3-0 SH 18IN BLACK

## (undated) DEVICE — SUT MONOCRYL 4-0 PS-2

## (undated) DEVICE — SET DECANTER MEDICHOICE

## (undated) DEVICE — SUT 2/0 30IN SILK BLK BRAI

## (undated) DEVICE — ELECTRODE REM PLYHSV RETURN 9

## (undated) DEVICE — SUT ETHILON 2-0 PSLX 30IN

## (undated) DEVICE — TROCAR ENDOPATH XCEL 12MM 10CM

## (undated) DEVICE — GAUZE SPONGE PEANUT STRL

## (undated) DEVICE — DRAPE SCOPE PILLOW WARMER

## (undated) DEVICE — SPONGE LAP XRAY DTECT 18X18IN

## (undated) DEVICE — IRRIGATOR ENDOSCOPY DISP.

## (undated) DEVICE — PACK UNIVERSAL SPLIT II

## (undated) DEVICE — SUT VICRYL PLUS 3-0 SH 18IN

## (undated) DEVICE — CUTTER PROXIMATE GREEN 75MM

## (undated) DEVICE — RELOAD SUREFORM 45 4.6 BLK 6R

## (undated) DEVICE — RELOAD PROXIMATE GREEN 75MM